# Patient Record
Sex: MALE | Race: WHITE | NOT HISPANIC OR LATINO | Employment: UNEMPLOYED | URBAN - METROPOLITAN AREA
[De-identification: names, ages, dates, MRNs, and addresses within clinical notes are randomized per-mention and may not be internally consistent; named-entity substitution may affect disease eponyms.]

---

## 2017-08-08 ENCOUNTER — ALLSCRIPTS OFFICE VISIT (OUTPATIENT)
Dept: OTHER | Facility: OTHER | Age: 32
End: 2017-08-08

## 2017-12-26 ENCOUNTER — ALLSCRIPTS OFFICE VISIT (OUTPATIENT)
Dept: OTHER | Facility: OTHER | Age: 32
End: 2017-12-26

## 2017-12-27 ENCOUNTER — APPOINTMENT (EMERGENCY)
Dept: RADIOLOGY | Facility: HOSPITAL | Age: 32
End: 2017-12-27
Payer: COMMERCIAL

## 2017-12-27 ENCOUNTER — HOSPITAL ENCOUNTER (EMERGENCY)
Facility: HOSPITAL | Age: 32
Discharge: HOME | End: 2017-12-27
Attending: EMERGENCY MEDICINE | Admitting: EMERGENCY MEDICINE
Payer: COMMERCIAL

## 2017-12-27 ENCOUNTER — ALLSCRIPTS OFFICE VISIT (OUTPATIENT)
Dept: OTHER | Facility: OTHER | Age: 32
End: 2017-12-27

## 2017-12-27 VITALS
RESPIRATION RATE: 16 BRPM | SYSTOLIC BLOOD PRESSURE: 135 MMHG | DIASTOLIC BLOOD PRESSURE: 70 MMHG | WEIGHT: 220 LBS | BODY MASS INDEX: 29.8 KG/M2 | TEMPERATURE: 99.1 F | OXYGEN SATURATION: 98 % | HEIGHT: 72 IN | HEART RATE: 79 BPM

## 2017-12-27 DIAGNOSIS — J36 PERITONSILLAR ABSCESS: Primary | ICD-10-CM

## 2017-12-27 DIAGNOSIS — J02.9 PHARYNGITIS: ICD-10-CM

## 2017-12-27 LAB
ANION GAP SERPL CALCULATED.3IONS-SCNC: 7 MMOL/L (ref 4–13)
BASOPHILS # BLD AUTO: 0 THOUSANDS/ΜL (ref 0–0.1)
BASOPHILS NFR BLD AUTO: 0 % (ref 0–1)
BUN SERPL-MCNC: 15 MG/DL (ref 5–25)
CALCIUM SERPL-MCNC: 8.9 MG/DL (ref 8.3–10.1)
CHLORIDE SERPL-SCNC: 103 MMOL/L (ref 100–108)
CO2 SERPL-SCNC: 30 MMOL/L (ref 21–32)
CREAT SERPL-MCNC: 1.05 MG/DL (ref 0.6–1.3)
EOSINOPHIL # BLD AUTO: 0.2 THOUSAND/ΜL (ref 0–0.61)
EOSINOPHIL NFR BLD AUTO: 2 % (ref 0–6)
ERYTHROCYTE [DISTWIDTH] IN BLOOD BY AUTOMATED COUNT: 13.6 % (ref 11.6–15.1)
GFR SERPL CREATININE-BSD FRML MDRD: 93 ML/MIN/1.73SQ M
GLUCOSE SERPL-MCNC: 99 MG/DL (ref 65–140)
HCT VFR BLD AUTO: 48.3 % (ref 42–52)
HGB BLD-MCNC: 16.1 G/DL (ref 14–18)
LYMPHOCYTES # BLD AUTO: 3.2 THOUSANDS/ΜL (ref 0.6–4.47)
LYMPHOCYTES NFR BLD AUTO: 24 % (ref 14–44)
MCH RBC QN AUTO: 28.4 PG (ref 27–31)
MCHC RBC AUTO-ENTMCNC: 33.3 G/DL (ref 31.4–37.4)
MCV RBC AUTO: 85 FL (ref 82–98)
MONOCYTES # BLD AUTO: 0.7 THOUSAND/ΜL (ref 0.17–1.22)
MONOCYTES NFR BLD AUTO: 6 % (ref 4–12)
NEUTROPHILS # BLD AUTO: 9 THOUSANDS/ΜL (ref 1.85–7.62)
NEUTS SEG NFR BLD AUTO: 68 % (ref 43–75)
NRBC BLD AUTO-RTO: 0 /100 WBCS
PLATELET # BLD AUTO: 261 THOUSANDS/UL (ref 130–400)
PMV BLD AUTO: 7.7 FL (ref 8.9–12.7)
POTASSIUM SERPL-SCNC: 3.4 MMOL/L (ref 3.5–5.3)
RBC # BLD AUTO: 5.67 MILLION/UL (ref 4.7–6.1)
S PYO AG THROAT QL: NEGATIVE
SODIUM SERPL-SCNC: 140 MMOL/L (ref 136–145)
WBC # BLD AUTO: 13.1 THOUSAND/UL (ref 4.8–10.8)

## 2017-12-27 PROCEDURE — 96361 HYDRATE IV INFUSION ADD-ON: CPT

## 2017-12-27 PROCEDURE — 96365 THER/PROPH/DIAG IV INF INIT: CPT

## 2017-12-27 PROCEDURE — 85025 COMPLETE CBC W/AUTO DIFF WBC: CPT | Performed by: EMERGENCY MEDICINE

## 2017-12-27 PROCEDURE — 70487 CT MAXILLOFACIAL W/DYE: CPT

## 2017-12-27 PROCEDURE — 96375 TX/PRO/DX INJ NEW DRUG ADDON: CPT

## 2017-12-27 PROCEDURE — 99284 EMERGENCY DEPT VISIT MOD MDM: CPT

## 2017-12-27 PROCEDURE — 80048 BASIC METABOLIC PNL TOTAL CA: CPT | Performed by: EMERGENCY MEDICINE

## 2017-12-27 PROCEDURE — 36415 COLL VENOUS BLD VENIPUNCTURE: CPT | Performed by: EMERGENCY MEDICINE

## 2017-12-27 RX ORDER — CITALOPRAM 20 MG/1
20 TABLET ORAL DAILY
COMMUNITY
End: 2018-04-09 | Stop reason: SDUPTHER

## 2017-12-27 RX ORDER — AMOXICILLIN AND CLAVULANATE POTASSIUM 875; 125 MG/1; MG/1
1 TABLET, FILM COATED ORAL EVERY 12 HOURS
Qty: 28 TABLET | Refills: 0 | Status: SHIPPED | OUTPATIENT
Start: 2017-12-27 | End: 2018-01-10

## 2017-12-27 RX ORDER — AMOXICILLIN AND CLAVULANATE POTASSIUM 875; 125 MG/1; MG/1
1 TABLET, FILM COATED ORAL EVERY 12 HOURS
Qty: 20 TABLET | Refills: 0 | Status: SHIPPED | OUTPATIENT
Start: 2017-12-27 | End: 2018-01-06

## 2017-12-27 RX ORDER — DEXAMETHASONE SODIUM PHOSPHATE 10 MG/ML
10 INJECTION, SOLUTION INTRAMUSCULAR; INTRAVENOUS ONCE
Status: COMPLETED | OUTPATIENT
Start: 2017-12-27 | End: 2017-12-27

## 2017-12-27 RX ORDER — OMEPRAZOLE 20 MG/1
20 CAPSULE, DELAYED RELEASE ORAL DAILY
COMMUNITY

## 2017-12-27 RX ADMIN — Medication 3 G: at 18:22

## 2017-12-27 RX ADMIN — SODIUM CHLORIDE 1000 ML: 0.9 INJECTION, SOLUTION INTRAVENOUS at 15:38

## 2017-12-27 RX ADMIN — DEXAMETHASONE SODIUM PHOSPHATE 10 MG: 10 INJECTION, SOLUTION INTRAMUSCULAR; INTRAVENOUS at 15:39

## 2017-12-27 RX ADMIN — LIDOCAINE HYDROCHLORIDE 15 ML: 20 SOLUTION ORAL; TOPICAL at 17:11

## 2017-12-27 RX ADMIN — IOHEXOL 100 ML: 350 INJECTION, SOLUTION INTRAVENOUS at 16:59

## 2017-12-27 NOTE — ED NOTES
Discharge instructions reviewed with patient  Patient states understanding  Patient discharged to home       Joseph Bajwa RN  12/27/17 8127

## 2017-12-27 NOTE — DISCHARGE INSTRUCTIONS
Peritonsillar Abscess   WHAT YOU NEED TO KNOW:   A peritonsillar abscess, or PTA, is a collection of pus in the peritonsillar space  The peritonsillar space is the area between your tonsil and the back wall of your throat  It is near the opening of the tubes leading to your stomach and lungs  DISCHARGE INSTRUCTIONS:   Call 911 if:   · You have trouble breathing  Return to the emergency department if:   · You have more pain, swelling, or redness in your throat  · Your symptoms get worse or do not get better, even with treatment  · You have difficulty or pain when you swallow, or you cannot eat or drink  Contact your healthcare provider if:   · Your abscess returns  · You have questions or concerns about your condition or care  Medicines:   · Antibiotics  help treat or prevent a bacterial infection  · Acetaminophen  decreases pain and fever  It is available without a doctor's order  Ask how much to take and how often to take it  Follow directions  Acetaminophen can cause liver damage if not taken correctly  · Steroids  decrease swelling  · NSAIDs , such as ibuprofen, help decrease swelling, pain, and fever  This medicine is available with or without a doctor's order  NSAIDs can cause stomach bleeding or kidney problems in certain people  If you take blood thinner medicine, always ask if NSAIDs are safe for you  Always read the medicine label and follow directions  Do not give these medicines to children under 10months of age without direction from your child's healthcare provider  · Take your medicine as directed  Contact your healthcare provider if you think your medicine is not helping or if you have side effects  Tell him or her if you are allergic to any medicine  Keep a list of the medicines, vitamins, and herbs you take  Include the amounts, and when and why you take them  Bring the list or the pill bottles to follow-up visits   Carry your medicine list with you in case of an emergency  Decrease your risk for a peritonsillar abscess:   · Care for your mouth and teeth  Brush and floss your teeth after you eat, and before you go to sleep  Gently brush your teeth and gums using a brush with soft bristles  Use a mouth rinse after you brush  See your dentist for regular check-ups  · Do not delay treatment for a sore throat  Make an appointment to see your doctor if you have a sore throat that continues for more than a few days  If you have a fever with a sore throat, call your doctor that day  Early treatment may prevent a peritonsillar abscess  Take your antibiotic for throat infections until it is done  · Do not smoke  Nicotine and other chemicals in cigarettes and cigars may increase your risk for a peritonsillar abscess  Ask your healthcare provider for information if you currently smoke and need help to quit  E-cigarettes or smokeless tobacco still contain nicotine  Talk to your healthcare provider before you use these products  Manage your symptoms:   · A liquid diet  may decrease your discomfort until the PTA is healed  A liquid diet may include jello, juices, or ice pops  Follow up with your healthcare provider as directed:  Write down your questions so you can remember to ask them during your visits  © 2017 [facility] Information is for End User's use only and may not be sold, redistributed or otherwise used for commercial purposes  All illustrations and images included in CareNotes® are the copyrighted property of [employer]  or Abhijeet Pelaez  The above information is an  only  It is not intended as medical advice for individual conditions or treatments  Talk to your doctor, nurse or pharmacist before following any medical regimen to see if it is safe and effective for you  Peritonsillar Abscess   WHAT YOU NEED TO KNOW:   A peritonsillar abscess, or PTA, is a collection of pus in the peritonsillar space   The peritonsillar space is the area between your tonsil and the back wall of your throat  It is near the opening of the tubes leading to your stomach and lungs  DISCHARGE INSTRUCTIONS:   Call 911 if:   · You have trouble breathing  Seek care immediately if:   · You have more pain, swelling, or redness in your throat  · Your symptoms get worse or do not get better, even with treatment  · You have difficulty or pain when you swallow, or you cannot eat or drink  Contact your healthcare provider if:   · Your abscess returns  · You have questions or concerns about your condition or care  Medicines:   · Antibiotics  help treat or prevent a bacterial infection  · Acetaminophen  decreases pain and fever  It is available without a doctor's order  Ask how much to take and how often to take it  Follow directions  Acetaminophen can cause liver damage if not taken correctly  · Steroids  decrease swelling  · NSAIDs , such as ibuprofen, help decrease swelling, pain, and fever  This medicine is available with or without a doctor's order  NSAIDs can cause stomach bleeding or kidney problems in certain people  If you take blood thinner medicine, always ask if NSAIDs are safe for you  Always read the medicine label and follow directions  Do not give these medicines to children under 10months of age without direction from your child's healthcare provider  · Take your medicine as directed  Contact your healthcare provider if you think your medicine is not helping or if you have side effects  Tell him or her if you are allergic to any medicine  Keep a list of the medicines, vitamins, and herbs you take  Include the amounts, and when and why you take them  Bring the list or the pill bottles to follow-up visits  Carry your medicine list with you in case of an emergency  Manage your symptoms:   · A liquid diet  may decrease your discomfort until the PTA is healed   A liquid diet may include jello, juices, or ice pops      Follow up with your healthcare provider as directed:  Write down your questions so you can remember to ask them during your visits  © 2017 [address] [employer] Information is for End User's use only and may not be sold, redistributed or otherwise used for commercial purposes  All illustrations and images included in CareNotes® are the copyrighted property of [employer] M [employer]  or Abhijeet Pelaez  The above information is an  only  It is not intended as medical advice for individual conditions or treatments  Talk to your doctor, nurse or pharmacist before following any medical regimen to see if it is safe and effective for you

## 2017-12-27 NOTE — ED PROVIDER NOTES
History  Chief Complaint   Patient presents with    Abscess     throat-began Kwasi SPIVEY said he has abscess and to come here     27-year-old male presents emergency department with right-sided throat pain  Was seen by the primary care physician earlier today and sent in for evaluation for possible rule out abscess        History provided by:  Patient  Sore Throat   Location:  Right  Quality:  Aching  Severity:  Moderate  Onset quality:  Gradual  Timing:  Constant  Progression:  Worsening  Chronicity:  New  Relieved by:  Nothing  Worsened by:  Nothing  Ineffective treatments:  None tried  Associated symptoms: adenopathy and rhinorrhea    Associated symptoms: no abdominal pain, no chest pain, no chills, no cough, no drooling, no fever, no headaches, no neck stiffness, no night sweats, no rash, no shortness of breath, no sinus congestion, no stridor and no trouble swallowing        Prior to Admission Medications   Prescriptions Last Dose Informant Patient Reported? Taking?   citalopram (CeleXA) 20 mg tablet   Yes Yes   Sig: Take 20 mg by mouth daily   omeprazole (PriLOSEC) 20 mg delayed release capsule   Yes Yes   Sig: Take 20 mg by mouth daily      Facility-Administered Medications: None       History reviewed  No pertinent past medical history  History reviewed  No pertinent surgical history  History reviewed  No pertinent family history  I have reviewed and agree with the history as documented  Social History   Substance Use Topics    Smoking status: Former Smoker    Smokeless tobacco: Current User     Types: Chew    Alcohol use Not on file        Review of Systems   Constitutional: Negative for chills, fever and night sweats  HENT: Positive for rhinorrhea and sore throat  Negative for drooling and trouble swallowing  Eyes: Negative for pain  Respiratory: Negative for cough, shortness of breath, wheezing and stridor  Cardiovascular: Negative for chest pain and leg swelling  Gastrointestinal: Negative for abdominal pain, diarrhea and nausea  Endocrine: Negative for polyuria  Genitourinary: Negative for dysuria, flank pain and urgency  Musculoskeletal: Negative for joint swelling, myalgias and neck stiffness  Skin: Negative for rash  Allergic/Immunologic: Negative for immunocompromised state  Neurological: Negative for dizziness, syncope, weakness, numbness and headaches  Hematological: Positive for adenopathy  Psychiatric/Behavioral: Negative for confusion and suicidal ideas  All other systems reviewed and are negative  Physical Exam  ED Triage Vitals   Temperature Pulse Respirations Blood Pressure SpO2   12/27/17 1513 12/27/17 1513 12/27/17 1513 12/27/17 1513 12/27/17 1513   99 1 °F (37 3 °C) 89 18 (!) 149/102 99 %      Temp Source Heart Rate Source Patient Position - Orthostatic VS BP Location FiO2 (%)   12/27/17 1513 12/27/17 1513 12/27/17 1513 12/27/17 1825 --   Oral Monitor Sitting Right arm       Pain Score       12/27/17 1513       7           Orthostatic Vital Signs  Vitals:    12/27/17 1530 12/27/17 1615 12/27/17 1745 12/27/17 1825   BP:  139/83  135/70   Pulse: 88 84 84 79   Patient Position - Orthostatic VS:    Lying       Physical Exam   Constitutional: He is oriented to person, place, and time  He appears well-developed and well-nourished  HENT:   Head: Normocephalic and atraumatic  Mouth/Throat: Uvula is midline  Tonsillar abscesses (right sided tonsilar swelling ) present  Tonsils are 3+ on the right  Tonsils are 2+ on the left  Right-sided tonsillar swelling greater than the left  Noted bilateral tonsillar swelling  Eyes: EOM are normal  Pupils are equal, round, and reactive to light  Neck: Normal range of motion  Neck supple  Cardiovascular: Normal rate and regular rhythm  Exam reveals no friction rub  No murmur heard  Pulmonary/Chest: Breath sounds normal  No respiratory distress  He has no wheezes  He has no rales  Abdominal: Soft  Bowel sounds are normal  He exhibits no distension  There is no tenderness  Musculoskeletal: Normal range of motion  He exhibits no edema or tenderness  Neurological: He is alert and oriented to person, place, and time  Skin: Skin is warm  No rash noted  Psychiatric: He has a normal mood and affect  Nursing note and vitals reviewed  ED Medications  Medications   ampicillin-sulbactam (UNASYN) 3 g in sodium chloride 0 9 % 100 mL IVPB (3 g Intravenous New Bag 12/27/17 1822)   sodium chloride 0 9 % bolus 1,000 mL (0 mL Intravenous Stopped 12/27/17 1706)   dexamethasone (PF) (DECADRON) injection 10 mg (10 mg Intravenous Given 12/27/17 1539)   iohexol (OMNIPAQUE) 350 MG/ML injection (MULTI-DOSE) 100 mL (100 mL Intravenous Given 12/27/17 1659)   lidocaine viscous (XYLOCAINE) 2 % mucosal solution 15 mL (15 mL Swish & Swallow Given 12/27/17 1711)       Diagnostic Studies  Results Reviewed     Procedure Component Value Units Date/Time    Basic metabolic panel [[id number]]  (Abnormal) Collected:  12/27/17 1539    Lab Status:  Final result Specimen:  Blood from Arm, Left Updated:  12/27/17 1555     Sodium 140 mmol/L      Potassium 3 4 (L) mmol/L      Chloride 103 mmol/L      CO2 30 mmol/L      Anion Gap 7 mmol/L      BUN 15 mg/dL      Creatinine 1 05 mg/dL      Glucose 99 mg/dL      Calcium 8 9 mg/dL      eGFR 93 ml/min/1 73sq m     Narrative:         National Kidney Disease Education Program recommendations are as follows:  GFR calculation is accurate only with a steady state creatinine  Chronic Kidney disease less than 60 ml/min/1 73 sq  meters  Kidney failure less than 15 ml/min/1 73 sq  meters      CBC and differential [[id number]]  (Abnormal) Collected:  12/27/17 1539    Lab Status:  Final result Specimen:  Blood from Arm, Left Updated:  12/27/17 1544     WBC 13 10 (H) Thousand/uL      RBC 5 67 Million/uL      Hemoglobin 16 1 g/dL      Hematocrit 48 3 %      MCV 85 fL      MCH 28 4 pg      MCHC 33 3 g/dL      RDW 13 6 %      MPV 7 7 (L) fL      Platelets 868 Thousands/uL      nRBC 0 /100 WBCs      Neutrophils Relative 68 %      Lymphocytes Relative 24 %      Monocytes Relative 6 %      Eosinophils Relative 2 %      Basophils Relative 0 %      Neutrophils Absolute 9 00 (H) Thousands/µL      Lymphocytes Absolute 3 20 Thousands/µL      Monocytes Absolute 0 70 Thousand/µL      Eosinophils Absolute 0 20 Thousand/µL      Basophils Absolute 0 00 Thousands/µL                  CT facial bones with contrast   Final Result by Radha Heller MD (12/27 1723)      Right tonsillar 7 mm fluid collection suggesting a right tonsillar/peritonsillar abscess  Right tonsillar soft tissue swelling suggesting tonsillitis  Bilateral mandibular molar periapical tooth abscesses/periodontal disease  Workstation performed: [id number]                    Procedures  Procedures       Phone Contacts  ED Phone Contact    ED Course  ED Course                                MDM  Number of Diagnoses or Management Options  Peritonsillar abscess: new and requires workup  Pharyngitis: new and requires workup  Diagnosis management comments: 69-year-old male presents to the emergency department right-sided throat pain noted with tonsillitis on examination CT scan performed with IV contrast for evaluation for abscess shows a small 7 mm abscess on the right  Offer the patient drainage in the emergency department given the small size likely not beneficial treat with antibiotics dose of steroids and plan outpatient management with ear nose and throat given strict instructions when to return back to the emergency department  no evidence of respiratory distress stridor or other impending airway issues  Pt re-examined and evaluated after testing and treatment  Spoke with the patient and feeling improved and sxs have resolved  Will discharge home with close f/u with pcp and instructed to return to the ED if sxs worsen or continue   Pt agrees with the plan for discharge and feels comfortable to go home with proper f/u  Advised to return for worsening or additional problems  Diagnostic tests were reviewed and questions answered  Diagnosis, care plan and treatment options were discussed  The patient understand instructions and will follow up as directed  Amount and/or Complexity of Data Reviewed  Clinical lab tests: reviewed and ordered  Tests in the radiology section of CPT®: ordered and reviewed  Review and summarize past medical records: yes      CritCare Time    Disposition  Final diagnoses:   Peritonsillar abscess   Pharyngitis     Time reflects when diagnosis was documented in both MDM as applicable and the Disposition within this note     Time User Action Codes Description Comment    12/27/2017  5:48 PM Sandra Hollingsworth Add [J36] Peritonsillar abscess     12/27/2017  5:48 PM Sandra Hollingsworth Add [J02 9] Pharyngitis       ED Disposition     ED Disposition Condition Comment    Discharge  [address] discharge to home/self care  Condition at discharge: Stable        Follow-up Information     Follow up With Specialties Details Why Farheen Dejesus MD Otolaryngology Call today  [address] [facility] [address] [place] [address]   [phone]          Patient's Medications   Discharge Prescriptions    AMOXICILLIN-CLAVULANATE (AUGMENTIN) 875-125 MG PER TABLET    Take 1 tablet by mouth every 12 (twelve) hours for 14 days       Start Date: 12/27/2017End Date: 1/10/2018       Order Dose: 1 tablet       Quantity: 28 tablet    Refills: 0    AMOXICILLIN-CLAVULANATE (AUGMENTIN) 875-125 MG PER TABLET    Take 1 tablet by mouth every 12 (twelve) hours for 10 days       Start Date: 12/27/2017End Date: 1/6/2018       Order Dose: 1 tablet       Quantity: 20 tablet    Refills: 0     No discharge procedures on file      ED Provider  Electronically Signed by           Deena Waddell DO  12/27/17 9437

## 2018-01-13 VITALS
DIASTOLIC BLOOD PRESSURE: 80 MMHG | HEART RATE: 90 BPM | OXYGEN SATURATION: 100 % | WEIGHT: 207.31 LBS | HEIGHT: 72 IN | RESPIRATION RATE: 18 BRPM | SYSTOLIC BLOOD PRESSURE: 120 MMHG | BODY MASS INDEX: 28.08 KG/M2

## 2018-01-20 NOTE — PROGRESS NOTES
Assessment   1  Sore throat (462) (J02 9)    Plan   Heartburn    · Omeprazole 20 MG Oral Capsule Delayed Release; TAKE 1 CAPSULE DAILY    EVERY MORNING BEFORE BREAKFAST    Discussion/Summary      21-year-old male here for sore throat x3 days with known productive cough  No fever tongue or throat swelling, shortness of breath  Mild pharyngeal erythema with left anterior cervical lymph node appreciated, no tonsillar exudates noted  Given patient's low center criteria, low suspicion for strep pharyngitis, likely viral  Advised patient to take Tylenol or ibuprofen for pain and fever  Also advised to RTO if symptoms worsen such as difficulty breathing, swallowing or hoarseness  The patient was counseled regarding instructions for management,-- risk factor reductions,-- patient and family education  The treatment plan was reviewed with the patient/guardian  The patient/guardian understands and agrees with the treatment plan       Self Referrals: No      Chief Complaint   PATIENT C/O SORETHROAT      History of Present Illness   HPI: Patient is a 21-year-old male here for sore throat x3 days  Reports he also has dry cough, but denies any fever, headache, rhinorrhea, congestion, sob, nausea, vomiting, abdominal pain or diarrhea  Review of Systems        Constitutional: no fever-- and-- no chills  ENT: sore throat, but-- no earache-- and-- no hoarseness  Cardiovascular: no chest pain  Respiratory: cough, but-- no shortness of breath  Gastrointestinal: no abdominal pain,-- no nausea,-- no vomiting-- and-- no diarrhea  Musculoskeletal: no myalgias  Integumentary: no rashes  Neurological: no headache  ROS reviewed  Active Problems   1  Anxiety (300 00) (F41 9)   2  Heartburn (787 1) (R12)   3  Screening for depression (V79 0) (Z13 89)    Past Medical History   1  History of Acute URI (465 9) (J06 9)   2   History of Anxiety and depression (300 4) (F41 8)    Family History   Father    1  Family history of anxiety disorder (V17 0) (Z81 8)   2  Family history of hypertension (V17 49) (Z82 49)    Social History    · Current smoker (305 1) (F17 200)   · Current some day smoker (305 1) (F17 200)    Surgical History   1  Denied: History Of Prior Surgery    Current Meds    1  Citalopram Hydrobromide 20 MG Oral Tablet; TAKE 1 TABLET DAILY; Therapy: [id number] to (Evaluate:[id number])  Requested for: 69Bzm3456; Last     Rx:[id number] Ordered   2  Omeprazole 20 MG Oral Capsule Delayed Release; TAKE 1 CAPSULE DAILY EVERY     MORNING BEFORE BREAKFAST; Therapy: 02SXN2601 to (Evaluate:[id number])  Requested for: 42Ruw8278; Last     Rx:[id number] Ordered     The medication list was reviewed and updated today  Allergies   1  No Known Drug Allergies    Vitals    Recorded: [id number] 05:00PM   Temperature 97 5 F   Heart Rate 95   Respiration 18   Systolic 508   Diastolic 80   Height 6 ft    Weight 219 lb    BMI Calculated 29 7   BSA Calculated 2 21   O2 Saturation 95     Physical Exam        Constitutional      General appearance: No acute distress, well appearing and well nourished  Eyes      Conjunctiva and lids: No swelling, erythema, or discharge  Ears, Nose, Mouth, and Throat      External inspection of ears and nose: Normal  -- mild pharyngeal erythema, no tonsillar exudate  Pulmonary      Respiratory effort: No increased work of breathing or signs of respiratory distress  Auscultation of lungs: Clear to auscultation, equal breath sounds bilaterally, no wheezes, no rales, no rhonci  Cardiovascular      Auscultation of heart: Normal rate and rhythm, normal S1 and S2, without murmurs  Abdomen      Abdomen: Non-tender, no masses  Lymphatic mildly tender left anterior cervical lymphadenopathy  Musculoskeletal      Digits and nails: Normal without clubbing or cyanosis         Skin      Skin and subcutaneous tissue: Normal without rashes or lesions         Psychiatric      Mood and affect: Normal           Signatures    Electronically signed by : Chaya Olivares MD; Dec 30 2017  6:59AM EST                       (Author)     Electronically signed by : JOSE Abreu ; Jan 19 2018  3:56PM EST

## 2018-01-22 VITALS
HEART RATE: 95 BPM | BODY MASS INDEX: 29.66 KG/M2 | HEIGHT: 72 IN | WEIGHT: 219 LBS | DIASTOLIC BLOOD PRESSURE: 80 MMHG | OXYGEN SATURATION: 95 % | SYSTOLIC BLOOD PRESSURE: 120 MMHG | TEMPERATURE: 97.5 F | RESPIRATION RATE: 18 BRPM

## 2018-01-23 VITALS
BODY MASS INDEX: 30.07 KG/M2 | HEART RATE: 90 BPM | WEIGHT: 222 LBS | TEMPERATURE: 97.5 F | RESPIRATION RATE: 18 BRPM | OXYGEN SATURATION: 97 % | DIASTOLIC BLOOD PRESSURE: 74 MMHG | HEIGHT: 72 IN | SYSTOLIC BLOOD PRESSURE: 116 MMHG

## 2018-02-11 ENCOUNTER — APPOINTMENT (EMERGENCY)
Dept: RADIOLOGY | Facility: HOSPITAL | Age: 33
End: 2018-02-11

## 2018-02-11 ENCOUNTER — HOSPITAL ENCOUNTER (EMERGENCY)
Facility: HOSPITAL | Age: 33
Discharge: HOME | End: 2018-02-11
Attending: EMERGENCY MEDICINE | Admitting: EMERGENCY MEDICINE

## 2018-02-11 VITALS
TEMPERATURE: 98.2 F | HEART RATE: 90 BPM | BODY MASS INDEX: 28.48 KG/M2 | OXYGEN SATURATION: 96 % | RESPIRATION RATE: 18 BRPM | DIASTOLIC BLOOD PRESSURE: 93 MMHG | WEIGHT: 210 LBS | SYSTOLIC BLOOD PRESSURE: 144 MMHG

## 2018-02-11 DIAGNOSIS — F41.9 ANXIETY: Primary | ICD-10-CM

## 2018-02-11 PROCEDURE — 99284 EMERGENCY DEPT VISIT MOD MDM: CPT

## 2018-02-11 PROCEDURE — 71046 X-RAY EXAM CHEST 2 VIEWS: CPT

## 2018-02-11 NOTE — DISCHARGE INSTRUCTIONS
Anxiety   WHAT YOU SHOULD KNOW:   Anxiety is a condition that causes you to feel excessive worry, uneasiness, or fear  Family or work stress, smoking, caffeine, and alcohol can increase your risk for anxiety  Certain medicines or health conditions can also increase your risk  Anxiety may begin gradually, and can become a long-term condition if it is not managed or treated  AFTER YOU LEAVE:   Medicines:   · Medicines  can help you feel more calm and relaxed, and decrease your symptoms  · Take your medicine as directed  Contact your healthcare provider if you think your medicine is not helping or if you have side effects  Tell him if you are allergic to any medicine  Keep a list of the medicines, vitamins, and herbs you take  Include the amounts, and when and why you take them  Bring the list or the pill bottles to follow-up visits  Carry your medicine list with you in case of an emergency  Follow up with your healthcare provider within 2 weeks or as directed:  Write down your questions so you remember to ask them during your visits  Manage anxiety:   · Go to counseling as directed  Cognitive behavioral therapy can help you understand and change how you react to events that trigger your symptoms  · Find ways to manage your symptoms  Activities such as exercise, meditation, or listening to music can help you relax  · Practice deep breathing  Breathing can change how your body reacts to stress  Focus on taking slow, deep breaths several times a day, or during an anxiety attack  Breathe in through your nose, and out through your mouth  · Avoid caffeine  Caffeine can make your symptoms worse  Avoid foods or drinks that are meant to increase your energy level  · Limit or avoid alcohol  Ask your healthcare provider if alcohol is safe for you  You may not be able to drink alcohol if you take certain anxiety or depression medicines  Limit alcohol to 1 drink per day if you are a woman   Limit alcohol to 2 drinks per day if you are a man  A drink of alcohol is 12 ounces of beer, 5 ounces of wine, or 1½ ounces of liquor  Contact your healthcare provider if:   · Your symptoms get worse or do not get better with treatment  · You think your medicine may be causing side effects  · Your anxiety keeps you from doing your regular daily activities  · You have new symptoms since your last visit  · You have questions or concerns about your condition or care  Seek care immediately or call 911 if:   · You have chest pain, tightness, or heaviness that may spread to your shoulders, arms, jaw, neck, or back  · You feel like hurting yourself or someone else  · You feel dizzy, lightheaded, or faint  © 2014 [address] is for End User's use only and may not be sold, redistributed or otherwise used for commercial purposes  All illustrations and images included in CareNotes® are the copyrighted property of [employer] M [employer]  or [address]  The above information is an  only  It is not intended as medical advice for individual conditions or treatments  Talk to your doctor, nurse or pharmacist before following any medical regimen to see if it is safe and effective for you

## 2018-02-12 ENCOUNTER — HOSPITAL ENCOUNTER (EMERGENCY)
Facility: HOSPITAL | Age: 33
Discharge: HOME | End: 2018-02-12
Attending: EMERGENCY MEDICINE | Admitting: EMERGENCY MEDICINE

## 2018-02-12 VITALS
WEIGHT: 210 LBS | DIASTOLIC BLOOD PRESSURE: 88 MMHG | TEMPERATURE: 101.8 F | OXYGEN SATURATION: 96 % | RESPIRATION RATE: 16 BRPM | SYSTOLIC BLOOD PRESSURE: 156 MMHG | BODY MASS INDEX: 28.44 KG/M2 | HEART RATE: 118 BPM | HEIGHT: 72 IN

## 2018-02-12 DIAGNOSIS — R50.9 FEVER: Primary | ICD-10-CM

## 2018-02-12 LAB
FLUAV AG SPEC QL IA: NEGATIVE
FLUAV AG SPEC QL: DETECTED
FLUBV AG SPEC QL IA: NEGATIVE
FLUBV AG SPEC QL: ABNORMAL
RSV B RNA SPEC QL NAA+PROBE: ABNORMAL

## 2018-02-12 PROCEDURE — 99283 EMERGENCY DEPT VISIT LOW MDM: CPT

## 2018-02-12 PROCEDURE — 87798 DETECT AGENT NOS DNA AMP: CPT | Performed by: EMERGENCY MEDICINE

## 2018-02-12 PROCEDURE — 87400 INFLUENZA A/B EACH AG IA: CPT | Performed by: EMERGENCY MEDICINE

## 2018-02-12 RX ORDER — IBUPROFEN 600 MG/1
600 TABLET ORAL ONCE
Status: COMPLETED | OUTPATIENT
Start: 2018-02-12 | End: 2018-02-12

## 2018-02-12 RX ORDER — ONDANSETRON 4 MG/1
4 TABLET, ORALLY DISINTEGRATING ORAL EVERY 6 HOURS PRN
Qty: 15 TABLET | Refills: 0 | Status: SHIPPED | OUTPATIENT
Start: 2018-02-12 | End: 2018-11-24

## 2018-02-12 RX ORDER — ONDANSETRON 4 MG/1
4 TABLET, ORALLY DISINTEGRATING ORAL ONCE
Status: COMPLETED | OUTPATIENT
Start: 2018-02-12 | End: 2018-02-12

## 2018-02-12 RX ADMIN — IBUPROFEN 600 MG: 600 TABLET, FILM COATED ORAL at 01:11

## 2018-02-12 RX ADMIN — ONDANSETRON 4 MG: 4 TABLET, ORALLY DISINTEGRATING ORAL at 01:14

## 2018-02-12 NOTE — DISCHARGE INSTRUCTIONS
Fever in Adults   WHAT YOU NEED TO KNOW:   A fever is an increase in your body temperature  Normal body temperature is 98 6°F (37°C)  Fever is generally defined as greater than 100 4°F (38°C)  Common causes include an infection, injury, or disease such as arthritis  DISCHARGE INSTRUCTIONS:   Return to the emergency department if:   · Your fever does not go away or gets worse even after treatment  · You have a stiff neck and a bad headache  · You are confused  You may not be able to think clearly or remember things like you normally do  · Your heart beats faster than usual even after treatment  · You have shortness of breath or chest pain when you breathe  · You urinate small amounts or not at all  · Your skin, lips, or nails turn blue  Contact your healthcare provider if:   · You have abdominal pain or you feel bloated  · You have nausea or are vomiting  · You have pain or burning when you urinate, or you have pain in your back  · You have questions or concerns about your condition or care  Medicines: You may need any of the following:  · NSAIDs , such as ibuprofen, help decrease swelling, pain, and fever  This medicine is available with or without a doctor's order  NSAIDs can cause stomach bleeding or kidney problems in certain people  If you take blood thinner medicine, always ask if NSAIDs are safe for you  Always read the medicine label and follow directions  Do not give these medicines to children under 10months of age without direction from your child's healthcare provider  · Acetaminophen  decreases pain and fever  It is available without a doctor's order  Ask how much to take and how often to take it  Follow directions  Read the labels of all other medicines you are using to see if they also contain acetaminophen, or ask your doctor or pharmacist  Acetaminophen can cause liver damage if not taken correctly   Do not use more than 4 grams (4,000 milligrams) total of acetaminophen in one day  · Antibiotics  may be given if you have an infection caused by bacteria  · Take your medicine as directed  Contact your healthcare provider if you think your medicine is not helping or if you have side effects  Tell him of her if you are allergic to any medicine  Keep a list of the medicines, vitamins, and herbs you take  Include the amounts, and when and why you take them  Bring the list or the pill bottles to follow-up visits  Carry your medicine list with you in case of an emergency  Follow up with your healthcare provider as directed:  Write down your questions so you remember to ask them during your visits  Self-care:   · Drink more liquids as directed  A fever makes you sweat  This can increase your risk for dehydration  Liquids can help prevent dehydration  ¨ Drink at least 6 to 8 eight-ounce cups of clear liquids each day  Drink water, juice, or broth  Do not drink sports drinks  They may contain caffeine  ¨ Ask your healthcare provider if you should drink an oral rehydration solution (ORS)  An ORS has the right amounts of water, salts, and sugar you need to replace body fluids  · Dress in lightweight clothes  Shivers may be a sign that your fever is rising  Do not put extra blankets or clothes on  This may cause your fever to rise even higher  Dress in light, comfortable clothing  Use a lightweight blanket or sheet when you sleep  Change your clothes, blanket, or sheets if they get wet  · Cool yourself safely  Take a bath in cool or lukewarm water  Use an ice pack wrapped in a small towel or wet a washcloth with cool water  Place the ice pack or wet washcloth on your forehead or the back of your neck  © 2017 [address] Information is for End User's use only and may not be sold, redistributed or otherwise used for commercial purposes   All illustrations and images included in CareNotes® are the copyrighted property of [employer]  or Abhijeet Pelaez  The above information is an  only  It is not intended as medical advice for individual conditions or treatments  Talk to your doctor, nurse or pharmacist before following any medical regimen to see if it is safe and effective for you  Upper Respiratory Infection   WHAT YOU NEED TO KNOW:   An upper respiratory infection is also called the common cold  It is an infection that can affect your nose, throat, ears, and sinuses  For healthy people, the common cold is usually not serious and does not need special treatment  Cold symptoms are usually worst for the first 3 to 5 days  Most people get better in 7 to 14 days  You may continue to cough for 2 to 3 weeks  Colds are caused by viruses and do not get better with antibiotics  DISCHARGE INSTRUCTIONS:   Seek care immediately if:   · You have chest pain or trouble breathing  Contact your healthcare provider if:   · You have a fever over 102ºF (39°C)  · Your sore throat gets worse or you see white or yellow spots in your throat  · Your symptoms get worse after 3 to 5 days or your cold is not better in 14 days  · You have a rash anywhere on your skin  · You have large, tender lumps in your neck  · You have thick, green, or yellow drainage from your nose  · You cough up thick yellow, green, or bloody mucus  · You are vomiting for more than 24 hours and cannot keep fluids down  · You have a bad earache  · You have questions or concerns about your condition or care  Medicines: You may need any of the following:  · Decongestants  help reduce nasal congestion and help you breathe more easily  If you take decongestant pills, they may make you feel restless or cause problems with your sleep  Do not use decongestant sprays for more than a few days  · Cough suppressants  help reduce coughing  Ask your healthcare provider which type of cough medicine is best for you       · NSAIDs , such as ibuprofen, help decrease swelling, pain, and fever  NSAIDs can cause stomach bleeding or kidney problems in certain people  If you take blood thinner medicine, always ask your healthcare provider if NSAIDs are safe for you  Always read the medicine label and follow directions  · Acetaminophen  decreases pain and fever  It is available without a doctor's order  Ask how much to take and how often to take it  Follow directions  Read the labels of all other medicines you are using to see if they also contain acetaminophen, or ask your doctor or pharmacist  Acetaminophen can cause liver damage if not taken correctly  Do not use more than 4 grams (4,000 milligrams) total of acetaminophen in one day  · Take your medicine as directed  Contact your healthcare provider if you think your medicine is not helping or if you have side effects  Tell him or her if you are allergic to any medicine  Keep a list of the medicines, vitamins, and herbs you take  Include the amounts, and when and why you take them  Bring the list or the pill bottles to follow-up visits  Carry your medicine list with you in case of an emergency  Follow up with your healthcare provider as directed:  Write down your questions so you remember to ask them during your visits  Self-care:   · Rest as much as possible  Slowly start to do more each day  · Drink more liquids as directed  Liquids will help thin and loosen mucus so you can cough it up  Liquids will also help prevent dehydration  Liquids that help prevent dehydration include water, fruit juice, and broth  Do not drink liquids that contain caffeine  Caffeine can increase your risk for dehydration  Ask your healthcare provider how much liquid to drink each day  · Soothe a sore throat  Gargle with warm salt water  This helps your sore throat feel better  Make salt water by dissolving ¼ teaspoon salt in 1 cup warm water  You may also suck on hard candy or throat lozenges   You may use a sore throat spray  · Use a humidifier or vaporizer  Use a cool mist humidifier or a vaporizer to increase air moisture in your home  This may make it easier for you to breathe and help decrease your cough  · Use saline nasal drops as directed  These help relieve congestion  · Apply petroleum-based jelly around the outside of your nostrils  This can decrease irritation from blowing your nose  · Do not smoke  Nicotine and other chemicals in cigarettes and cigars can make your symptoms worse  They can also cause infections such as bronchitis or pneumonia  Ask your healthcare provider for information if you currently smoke and need help to quit  E-cigarettes or smokeless tobacco still contain nicotine  Talk to your healthcare provider before you use these products  Prevent spreading your cold to others:   · Try to stay away from other people during the first 2 to 3 days of your cold when it is more easily spread  · Do not share food or drinks  · Do not share hand towels with household members  · Wash your hands often, especially after you blow your nose  Turn away from other people and cover your mouth and nose with a tissue when you sneeze or cough  © 2017 [facility] Information is for End User's use only and may not be sold, redistributed or otherwise used for commercial purposes  All illustrations and images included in CareNotes® are the copyrighted property of [employer]  or Abhijeet Pelaez  The above information is an  only  It is not intended as medical advice for individual conditions or treatments  Talk to your doctor, nurse or pharmacist before following any medical regimen to see if it is safe and effective for you

## 2018-02-17 NOTE — ED PROVIDER NOTES
History  Chief Complaint   Patient presents with    Flu Symptoms     Pt c/o feeling flushed and having chills  Pt is concerned that he "might have the flu and die "     Patient presents for evaluation of aches and chills  Thinks he might have the flu and will die  Patient seen here earlier and discharged with anxiety  Now has a fever  History provided by:  Patient   used: No    Flu Symptoms   Presenting symptoms: fever    Presenting symptoms: no shortness of breath    Associated symptoms: chills        Prior to Admission Medications   Prescriptions Last Dose Informant Patient Reported? Taking?   citalopram (CeleXA) 20 mg tablet   Yes No   Sig: Take 20 mg by mouth daily   omeprazole (PriLOSEC) 20 mg delayed release capsule   Yes No   Sig: Take 20 mg by mouth daily      Facility-Administered Medications: None       Past Medical History:   Diagnosis Date    Psychiatric disorder        History reviewed  No pertinent surgical history  History reviewed  No pertinent family history  I have reviewed and agree with the history as documented  Social History   Substance Use Topics    Smoking status: Former Smoker    Smokeless tobacco: Current User     Types: Chew    Alcohol use No        Review of Systems   Constitutional: Positive for chills and fever  Respiratory: Negative for shortness of breath  Cardiovascular: Negative for chest pain  All other systems reviewed and are negative        Physical Exam  ED Triage Vitals   Temperature Pulse Respirations Blood Pressure SpO2   02/12/18 0054 02/12/18 0054 02/12/18 0054 02/12/18 0054 02/12/18 0054   (!) 101 8 °F (38 8 °C) (!) 148 (!) 24 156/88 96 %      Temp Source Heart Rate Source Patient Position - Orthostatic VS BP Location FiO2 (%)   02/12/18 0054 02/12/18 0143 02/12/18 0054 02/12/18 0054 --   Tympanic Monitor Lying Right arm       Pain Score       02/12/18 0054       No Pain           Orthostatic Vital Signs  Vitals: 02/12/18 0054 02/12/18 0143   BP: 156/88    Pulse: (!) 148 (!) 118   Patient Position - Orthostatic VS: Lying        Physical Exam   Constitutional: He is oriented to person, place, and time  No distress  HENT:   Mouth/Throat: Oropharynx is clear and moist    Eyes: Pupils are equal, round, and reactive to light  Neck: Normal range of motion  Cardiovascular: Normal rate, regular rhythm and intact distal pulses  Pulmonary/Chest: Effort normal and breath sounds normal  No respiratory distress  Abdominal: Soft  There is no tenderness  Musculoskeletal: Normal range of motion  Neurological: He is alert and oriented to person, place, and time  Skin: Capillary refill takes less than 2 seconds  He is not diaphoretic  Nursing note and vitals reviewed        ED Medications  Medications   ibuprofen (MOTRIN) tablet 600 mg (600 mg Oral Given 2/12/18 0111)   ondansetron (ZOFRAN-ODT) dispersible tablet 4 mg (4 mg Oral Given 2/12/18 0114)       Diagnostic Studies  Results Reviewed     Procedure Component Value Units Date/Time    Influenza A/B and RSV by PCR (Indicated for patients > 2 mo of age) [[id number]]  (Abnormal) Collected:  02/12/18 0111    Lab Status:  Final result Specimen:  Nasopharyngeal from Nasopharyngeal Swab Updated:  02/13/18 1814     INFLU A PCR Detected (A)     INFLU B PCR None Detected     RSV PCR None Detected    Rapid Influenza Screen with Reflex PCR (indicated for patients <2mo of age) [[id number]]  (Normal) Collected:  02/12/18 0111    Lab Status:  Final result Specimen:  Nasopharyngeal from Nasopharyngeal Swab Updated:  02/12/18 0134     Rapid Influenza A Ag Negative     Rapid Influenza B Ag Negative                 No orders to display              Procedures  Procedures       Phone Contacts  ED Phone Contact    ED Course  ED Course                                MDM  Number of Diagnoses or Management Options  Fever:   Diagnosis management comments: Pulse ox 96% on RA indicating adequate oxygenation    Rapid flu was negative advised confirmatory test will be sent and will be called with results  Reassured and discharged  Amount and/or Complexity of Data Reviewed  Clinical lab tests: ordered and reviewed    Patient Progress  Patient progress: stable    CritCare Time    Disposition  Final diagnoses:   Fever     Time reflects when diagnosis was documented in both MDM as applicable and the Disposition within this note     Time User Action Codes Description Comment    2/12/2018  1:38 AM Mario Alberto Colin Mitch [R50 9] Fever       ED Disposition     ED Disposition Condition Comment    Discharge  Juan Antonio Both discharge to home/self care  Condition at discharge: stable        Follow-up Information     Follow up With Specialties Details Why Contact Info    Christopher Potter MD Family Medicine In 3 days  [address] [address]  [phone]          Discharge Medication List as of 2/12/2018  1:39 AM      CONTINUE these medications which have NOT CHANGED    Details   citalopram (CeleXA) 20 mg tablet Take 20 mg by mouth daily, Historical Med      omeprazole (PriLOSEC) 20 mg delayed release capsule Take 20 mg by mouth daily, Historical Med           No discharge procedures on file      ED Provider  Electronically Signed by           Mary Daugherty DO  02/16/18 2676

## 2018-03-24 ENCOUNTER — HOSPITAL ENCOUNTER (EMERGENCY)
Facility: HOSPITAL | Age: 33
Discharge: HOME | End: 2018-03-24
Attending: EMERGENCY MEDICINE | Admitting: EMERGENCY MEDICINE
Payer: SUBSIDIZED

## 2018-03-24 VITALS
OXYGEN SATURATION: 95 % | HEIGHT: 72 IN | RESPIRATION RATE: 18 BRPM | SYSTOLIC BLOOD PRESSURE: 140 MMHG | BODY MASS INDEX: 28.44 KG/M2 | HEART RATE: 109 BPM | WEIGHT: 210 LBS | DIASTOLIC BLOOD PRESSURE: 104 MMHG | TEMPERATURE: 99.2 F

## 2018-03-24 DIAGNOSIS — J02.0 STREP THROAT: Primary | ICD-10-CM

## 2018-03-24 PROCEDURE — 99282 EMERGENCY DEPT VISIT SF MDM: CPT

## 2018-03-24 RX ORDER — AMOXICILLIN 250 MG/1
500 CAPSULE ORAL ONCE
Status: COMPLETED | OUTPATIENT
Start: 2018-03-24 | End: 2018-03-24

## 2018-03-24 RX ORDER — AMOXICILLIN 500 MG/1
500 CAPSULE ORAL 3 TIMES DAILY
Qty: 30 CAPSULE | Refills: 0 | Status: SHIPPED | OUTPATIENT
Start: 2018-03-24 | End: 2018-04-03

## 2018-03-24 RX ADMIN — AMOXICILLIN 500 MG: 250 CAPSULE ORAL at 12:39

## 2018-03-24 NOTE — ED PROVIDER NOTES
History  Chief Complaint   Patient presents with    Sore Throat     pt reports sore throat x a day and a half  Patient has a history of recurrent streptococcal pharyngitis  He has had strep throat many times and has had peritonsillar abscesses as well  Patient was well until about a day and a half ago when he started having a sore throat associated with fevers and diaphoresis  He has no congestion no cough  He has noted no rashes  Patient looked in his pharynx and noted the white exudates that are typical of his recurrent infections  Patient came in suspected he has strep throat again  Prior to Admission Medications   Prescriptions Last Dose Informant Patient Reported? Taking?   citalopram (CeleXA) 20 mg tablet   Yes No   Sig: Take 20 mg by mouth daily   omeprazole (PriLOSEC) 20 mg delayed release capsule   Yes No   Sig: Take 20 mg by mouth daily   ondansetron (ZOFRAN-ODT) 4 mg disintegrating tablet   No No   Sig: Take 1 tablet (4 mg total) by mouth every 6 (six) hours as needed for nausea or vomiting for up to 15 doses      Facility-Administered Medications: None       Past Medical History:   Diagnosis Date    Psychiatric disorder        History reviewed  No pertinent surgical history  History reviewed  No pertinent family history  I have reviewed and agree with the history as documented  Social History   Substance Use Topics    Smoking status: Former Smoker    Smokeless tobacco: Current User     Types: Chew    Alcohol use No        Review of Systems   Constitutional: Positive for diaphoresis and fever  HENT: Positive for sore throat  Negative for congestion, sinus pain and sinus pressure  Respiratory: Negative for cough and shortness of breath  Cardiovascular: Negative for chest pain  Gastrointestinal: Negative for abdominal pain  Skin: Negative for rash  All other systems reviewed and are negative        Physical Exam  ED Triage Vitals [03/24/18 1221]   Temperature Pulse Respirations Blood Pressure SpO2   99 2 °F (37 3 °C) (!) 109 18 (!) 140/104 95 %      Temp Source Heart Rate Source Patient Position - Orthostatic VS BP Location FiO2 (%)   Tympanic -- -- -- --      Pain Score       8           Orthostatic Vital Signs  Vitals:    03/24/18 1221   BP: (!) 140/104   Pulse: (!) 109       Physical Exam   Constitutional: He is oriented to person, place, and time  He appears well-developed and well-nourished  HENT:   Head: Normocephalic  Right Ear: External ear normal    Left Ear: External ear normal    Mouth/Throat: Oropharyngeal exudate present  Eyes: Conjunctivae and EOM are normal    Neck: Normal range of motion  Neck supple  Cardiovascular: Normal rate, regular rhythm, normal heart sounds and intact distal pulses  Pulmonary/Chest: Effort normal and breath sounds normal    Abdominal: Soft  Bowel sounds are normal  There is tenderness  Musculoskeletal: Normal range of motion  He exhibits no edema  Lymphadenopathy:     He has cervical adenopathy  Neurological: He is alert and oriented to person, place, and time  Skin: Skin is warm and dry  Psychiatric: He has a normal mood and affect  His behavior is normal    Nursing note and vitals reviewed  ED Medications  Medications   amoxicillin (AMOXIL) capsule 500 mg (not administered)       Diagnostic Studies  Results Reviewed     None                 No orders to display              Procedures  Procedures       Phone Contacts  ED Phone Contact    ED Course  ED Course                                MDM  Number of Diagnoses or Management Options  Diagnosis management comments: Exhibited pharyngitis in this patient without her the symptoms and a history of recurrent strep is clearly strep once again    Will start him on amoxicillin now    CritCare Time    Disposition  Final diagnoses:   Strep throat     Time reflects when diagnosis was documented in both MDM as applicable and the Disposition within this note Time User Action Codes Description Comment    3/24/2018 12:36 PM Miller VILLEDA Add [J02 0] Strep throat       ED Disposition     ED Disposition Condition Comment    Discharge  Mariya Hogue discharge to home/self care  Condition at discharge: Stable        Follow-up Information     Follow up With Specialties Details Why Contact Info    Batool Fuentes MD Family Medicine In 1 day  [address]  [address] [place] [address]  [phone]          Patient's Medications   Discharge Prescriptions    AMOXICILLIN (AMOXIL) 500 MG CAPSULE    Take 1 capsule (500 mg total) by mouth 3 (three) times a day for 10 days       Start Date: 3/24/2018 End Date: 4/3/2018       Order Dose: 500 mg       Quantity: 30 capsule    Refills: 0     No discharge procedures on file      ED Provider  Electronically Signed by           Dave Wright MD  03/24/18 7399

## 2018-03-24 NOTE — DISCHARGE INSTRUCTIONS
Strep Throat, Ambulatory Care   GENERAL INFORMATION:   Strep throat  is a throat infection caused by bacteria  It is easily spread from person to person  Common symptoms include the following:   · Sore, red, and swollen throat    · Fever and headache     · Upset stomach, abdominal pain, or vomiting    · White or yellow patches or blisters in the back of your throat    · Tender, swollen lumps on the sides of your neck or jaw    · Throat pain when you swallow  Seek immediate care for the following symptoms:   · Throat pain that makes it too painful to drink fluids    · Drooling because you cannot swallow your spit    · Not able to open your mouth all the way or your voice is muffled    · Trouble breathing because your throat is swollen    · New symptoms like a bad headache, stiff neck, chest pain, or vomiting    · Blood in your urine and swollen face, feet, or hands  Treatment for strep throat:  You will need antibiotic medicine to treat your strep throat  Take your antibiotics until they are gone, even if you feel better  Do this unless your caregiver says it is okay to stop your antibiotics  You may return to work or school 24 hours after you start antibiotics  Manage strep throat:   · Do not smoke  If you smoke, it is never too late to quit  Smoking may make your symptoms worse  Ask for information if you need help quitting  · Drink juice, milk shakes, or soup  if your throat is too sore to eat solid food  Drinking liquids can also help prevent dehydration  · Gargle with salt water  Mix ¼ teaspoon salt in a glass of warm water and gargle  This may help reduce swelling in your throat  · Use lozenges, ice, soft foods, or popsicles  to soothe your throat    Prevent the spread of strep throat:   · Do not share food or drinks    · Wash your hands often    · Replace your toothbrush after you have taken antibiotics for 24 hours  Follow up with your healthcare provider as directed:  Write down your questions so you remember to ask them during your visits  CARE AGREEMENT:   You have the right to help plan your care  Learn about your health condition and how it may be treated  Discuss treatment options with your caregivers to decide what care you want to receive  You always have the right to refuse treatment  The above information is an  only  It is not intended as medical advice for individual conditions or treatments  Talk to your doctor, nurse or pharmacist before following any medical regimen to see if it is safe and effective for you  © 2014 [address] is for End User's use only and may not be sold, redistributed or otherwise used for commercial purposes  All illustrations and images included in CareNotes® are the copyrighted property of [employer]  or Abhijeet Pelaez

## 2018-04-09 DIAGNOSIS — F41.9 ANXIETY: Primary | ICD-10-CM

## 2018-04-09 RX ORDER — CITALOPRAM 20 MG/1
20 TABLET ORAL DAILY
Qty: 30 TABLET | Refills: 0 | Status: SHIPPED | OUTPATIENT
Start: 2018-04-09 | End: 2018-04-25 | Stop reason: SDUPTHER

## 2018-04-09 RX ORDER — CITALOPRAM 20 MG/1
TABLET ORAL
Qty: 30 TABLET | Refills: 0 | OUTPATIENT
Start: 2018-04-09

## 2018-04-24 DIAGNOSIS — F41.9 ANXIETY: ICD-10-CM

## 2018-04-24 RX ORDER — CITALOPRAM 20 MG/1
20 TABLET ORAL DAILY
Qty: 30 TABLET | Refills: 0 | Status: CANCELLED | OUTPATIENT
Start: 2018-04-24

## 2018-04-25 ENCOUNTER — OFFICE VISIT (OUTPATIENT)
Dept: FAMILY MEDICINE CLINIC | Facility: CLINIC | Age: 33
End: 2018-04-25

## 2018-04-25 DIAGNOSIS — R03.0 ELEVATED BLOOD PRESSURE, SITUATIONAL: ICD-10-CM

## 2018-04-25 DIAGNOSIS — Z00.00 HEALTH CARE MAINTENANCE: ICD-10-CM

## 2018-04-25 DIAGNOSIS — F41.9 ANXIETY: Primary | ICD-10-CM

## 2018-04-25 PROCEDURE — 99213 OFFICE O/P EST LOW 20 MIN: CPT | Performed by: FAMILY MEDICINE

## 2018-04-25 RX ORDER — CITALOPRAM 20 MG/1
20 TABLET ORAL DAILY
Qty: 60 TABLET | Refills: 3 | Status: SHIPPED | OUTPATIENT
Start: 2018-04-25 | End: 2019-06-17 | Stop reason: SDUPTHER

## 2018-04-27 VITALS
BODY MASS INDEX: 27.77 KG/M2 | SYSTOLIC BLOOD PRESSURE: 125 MMHG | OXYGEN SATURATION: 96 % | RESPIRATION RATE: 20 BRPM | HEART RATE: 80 BPM | DIASTOLIC BLOOD PRESSURE: 80 MMHG | HEIGHT: 72 IN | WEIGHT: 205 LBS

## 2018-04-27 PROBLEM — F41.9 ANXIETY: Status: ACTIVE | Noted: 2018-04-27

## 2018-04-27 PROBLEM — R03.0 ELEVATED BLOOD PRESSURE, SITUATIONAL: Status: ACTIVE | Noted: 2018-04-27

## 2018-04-27 NOTE — ASSESSMENT & PLAN NOTE
- pt has severe anxiety; he does report improvement on symptoms on Celexa 20mg qd, but then reduced the dose since he didn'thave enough medication pills and di not want to come to a doctor for refill; will refill his Celexa at 20mg qd, he also is strongly advised to contact [facility] and [facility] to see who he can get in sooner with for psychotherapy, including CBT   Also gave him the name of an Anxiety workbook, The Anxiety and Phobia workbook, for him to read to help with symptoms  - his anxiety has huge contribution to his constant worry about his health including his BP; at this time I did order some basic labs, and advised him to return for a CPE so that we can screen him appropriately; at that time we can address other worries of his about his health  - denies depression, no SI/HI

## 2018-04-27 NOTE — PROGRESS NOTES
Assessment/Plan:    Anxiety  - pt has severe anxiety; he does report improvement on symptoms on Celexa 20mg qd, but then reduced the dose since he didn'thave enough medication pills and di not want to come to a doctor for refill; will refill his Celexa at 20mg qd, he also is strongly advised to contact [facility] and [facility] to see who he can get in sooner with for psychotherapy, including CBT  Also gave him the name of an Anxiety workbook, The Anxiety and Phobia workbook, for him to read to help with symptoms  - his anxiety has huge contribution to his constant worry about his health including his BP; at this time I did order some basic labs, and advised him to return for a CPE so that we can screen him appropriately; at that time we can address other worries of his about his health  - denies depression, no SI/HI    Elevated blood pressure, situational  - review of EMR past couple months shows normal appearing BP; pt reports recently every time he takes his BP at home, he is very anxious, and has been having few readings with SBP of 140s, never >150 or >100 for DBP  - at this visit, although initial BP elevated 162/80, repeat was initially 125/80   Discussed with him that his anxiety, and worrying about BP plays a role in elevated it, which pt also admits that he gets very anxious soon as the BP cuff is on; asked him to keep a log of his BP at home, measure it once a week when he is most relaxed, and then we can review the log at his next visit; if we get at least two high readings, we can consider dx of HTN, but even then treating his anxiety would be mainstay for the elevated BP      - pt also worried about an incidental finding of 9mm calcification seen in his pelvis on screening XR that was done at his work, no urinary symptoms, no pain in pelvis area, reassurance given that this is benign and common - called phlebolith     - more than 50% of visit spent on discussion of anxiety mgt and counseling on importance of psychotherapy    Subjective: Anxiety & elevated BP     Patient ID: Parisa Bender is a 34 y o  male  Pt here to discuss anxiety & recent elevated BPs at home:  - anxiety: notes severe anxiety x years, constantly worrying about everything, feeling something bad's going to happen; was started last year on celexa, dose increased to 20 qd couple months ago that he reports did help with his anxiety, make him more calm, but he was running out of the med so started taking 0 5 tab every other day as he was scared of coming to see a doc; not seen a therapist yet, not had any psychotherapy; denies depression  - pt reports he uses his sister's BP cuff at home to take BP once every other week and has noted occasional readings of 140s SBP/80s DBP, reports he feeling very anxious and scared soon as the BP cuff is placed on his arm, thinks his anxiety may be elevating his BP; worried that couple family members have HTN, so he is worried if this will kill him; no recent diagnosis of HTN  - got a screening XR of back for his job at work which found incidental 9mm calcification in his pelvis, denies any hematuria/dysuria/flank pain/fevers/injuries/pelvic pain/back pain        The following portions of the patient's history were reviewed and updated as appropriate: allergies, current medications, past medical history, past social history and past surgical history  Review of Systems   Constitutional: Negative for chills and fever  Respiratory: Negative for shortness of breath and wheezing  Gastrointestinal: Negative for abdominal pain, nausea and vomiting  Objective:      /80   Pulse 80   Resp 20   Ht 6' (1 829 m)   Wt 93 kg (205 lb)   SpO2 96%   BMI 27 80 kg/m²          Physical Exam   Constitutional: No distress  HENT:   Head: Normocephalic and atraumatic     Right Ear: External ear normal    Left Ear: External ear normal    Cardiovascular: Normal rate, regular rhythm, normal heart sounds and intact distal pulses  No murmur heard  Pulmonary/Chest: Effort normal and breath sounds normal  No respiratory distress  He has no wheezes  He has no rales  Abdominal: Soft  Bowel sounds are normal  He exhibits no distension  There is no tenderness  There is no rebound  Musculoskeletal: He exhibits no edema, tenderness or deformity  Neurological: He is alert  Skin: Skin is warm and dry  He is not diaphoretic  Psychiatric: His behavior is normal  His mood appears anxious

## 2018-04-27 NOTE — ASSESSMENT & PLAN NOTE
- review of EMR past couple months shows normal appearing BP; pt reports recently every time he takes his BP at home, he is very anxious, and has been having few readings with SBP of 140s, never >150 or >100 for DBP  - at this visit, although initial BP elevated 162/80, repeat was initially 125/80   Discussed with him that his anxiety, and worrying about BP plays a role in elevated it, which pt also admits that he gets very anxious soon as the BP cuff is on; asked him to keep a log of his BP at home, measure it once a week when he is most relaxed, and then we can review the log at his next visit; if we get at least two high readings, we can consider dx of HTN, but even then treating his anxiety would be mainstay for the elevated BP

## 2018-05-09 ENCOUNTER — OFFICE VISIT (OUTPATIENT)
Dept: FAMILY MEDICINE CLINIC | Facility: CLINIC | Age: 33
End: 2018-05-09

## 2018-05-09 VITALS
HEART RATE: 115 BPM | BODY MASS INDEX: 27.5 KG/M2 | RESPIRATION RATE: 20 BRPM | WEIGHT: 203 LBS | TEMPERATURE: 97.6 F | HEIGHT: 72 IN | SYSTOLIC BLOOD PRESSURE: 122 MMHG | OXYGEN SATURATION: 98 % | DIASTOLIC BLOOD PRESSURE: 74 MMHG

## 2018-05-09 DIAGNOSIS — B34.9 VIRAL SYNDROME: Primary | ICD-10-CM

## 2018-05-09 PROCEDURE — 99213 OFFICE O/P EST LOW 20 MIN: CPT | Performed by: NURSE PRACTITIONER

## 2018-05-09 NOTE — PROGRESS NOTES
Assessment/Plan:  1  You can use nasal saline for the nasal congestion  2  Use Mucinex for cough  3  Follow-up condition changes or worsens     Diagnoses and all orders for this visit:    Viral syndrome          Subjective:      Patient ID: Abby Allison is a 34 y o  male  34-year-old male presents with cough since Thursday  Reports when he cough there is a little bit of blood  Denies any recent travel  Reports he had a negative TB screen recently  Reports postnasal drip and congestion  Denies wheezing/SOB/CP  Denies fever  Denies medications  Was recently exposed to somebody with similar symptoms  The following portions of the patient's history were reviewed and updated as appropriate: allergies and current medications  Review of Systems   Constitutional: Negative  HENT: Positive for congestion  Respiratory: Positive for cough  Cardiovascular: Negative  Objective:      /74   Pulse (!) 115   Temp 97 6 °F (36 4 °C)   Resp 20   Ht 6' (1 829 m)   Wt 92 1 kg (203 lb)   SpO2 98%   BMI 27 53 kg/m²          Physical Exam   Constitutional: He appears well-developed and well-nourished  HENT:   Head: Normocephalic and atraumatic  Right Ear: External ear normal    Left Ear: External ear normal    Nose: Nose normal    Mouth/Throat: Oropharynx is clear and moist    Cardiovascular: Normal rate, regular rhythm and normal heart sounds      Pulmonary/Chest: Effort normal and breath sounds normal

## 2018-10-02 ENCOUNTER — OFFICE VISIT (OUTPATIENT)
Dept: FAMILY MEDICINE CLINIC | Facility: CLINIC | Age: 33
End: 2018-10-02

## 2018-10-02 VITALS
TEMPERATURE: 97.4 F | SYSTOLIC BLOOD PRESSURE: 122 MMHG | DIASTOLIC BLOOD PRESSURE: 78 MMHG | HEART RATE: 76 BPM | OXYGEN SATURATION: 96 % | RESPIRATION RATE: 18 BRPM

## 2018-10-02 DIAGNOSIS — K04.7 TOOTH INFECTION: Primary | ICD-10-CM

## 2018-10-02 PROCEDURE — 99213 OFFICE O/P EST LOW 20 MIN: CPT | Performed by: NURSE PRACTITIONER

## 2018-10-02 RX ORDER — AMOXICILLIN AND CLAVULANATE POTASSIUM 875; 125 MG/1; MG/1
1 TABLET, FILM COATED ORAL EVERY 12 HOURS SCHEDULED
Qty: 14 TABLET | Refills: 0 | Status: SHIPPED | OUTPATIENT
Start: 2018-10-02 | End: 2018-10-09

## 2018-10-02 NOTE — PROGRESS NOTES
Assessment/Plan: 1  Take NSAID for symptom relief  2  Make sure you follow up with her dentist in the next couple of days for re-evaluation  3  Follow up if condition changes or worsens     Diagnoses and all orders for this visit:    Tooth infection  -     amoxicillin-clavulanate (AUGMENTIN) 875-125 mg per tablet; Take 1 tablet by mouth every 12 (twelve) hours for 7 days          Subjective:      Patient ID: Ilya Allen is a 34 y o  male  31yo M presents with tooth pain in the right upper and lower molars for about 3 days  Reports that he has gotten this before and was treated with antibiotic   Denies swelling  Denies fever  Taking ibuprofen and using and Anbesol  Helping  Needs to find a dentist   Pain in same spot he had abscess in over 1 year ago  Abscess resolved with abx  The following portions of the patient's history were reviewed and updated as appropriate: allergies and current medications  Review of Systems   Constitutional: Negative  HENT:        Tooth pain         Objective:      /78   Pulse 76   Temp (!) 97 4 °F (36 3 °C)   Resp 18   SpO2 96%          Physical Exam   Constitutional: He appears well-developed and well-nourished  HENT:   Head: Normocephalic and atraumatic  Right Ear: External ear normal    Left Ear: External ear normal    Nose: Nose normal    Mouth/Throat: Oropharynx is clear and moist     Poor dentition   Cardiovascular: Normal rate and normal heart sounds      Pulmonary/Chest: Effort normal and breath sounds normal

## 2018-11-24 ENCOUNTER — APPOINTMENT (EMERGENCY)
Dept: RADIOLOGY | Facility: HOSPITAL | Age: 33
End: 2018-11-24

## 2018-11-24 ENCOUNTER — HOSPITAL ENCOUNTER (EMERGENCY)
Facility: HOSPITAL | Age: 33
Discharge: HOME | End: 2018-11-24
Attending: EMERGENCY MEDICINE | Admitting: EMERGENCY MEDICINE

## 2018-11-24 VITALS
WEIGHT: 210 LBS | RESPIRATION RATE: 18 BRPM | SYSTOLIC BLOOD PRESSURE: 142 MMHG | BODY MASS INDEX: 28.48 KG/M2 | DIASTOLIC BLOOD PRESSURE: 84 MMHG | HEART RATE: 80 BPM | OXYGEN SATURATION: 97 % | TEMPERATURE: 98 F

## 2018-11-24 DIAGNOSIS — K04.7 PERIAPICAL ABSCESS: Primary | ICD-10-CM

## 2018-11-24 LAB
ANION GAP SERPL CALCULATED.3IONS-SCNC: 3 MMOL/L (ref 4–13)
BASOPHILS # BLD AUTO: 0.03 THOUSANDS/ΜL (ref 0–0.1)
BASOPHILS NFR BLD AUTO: 0 % (ref 0–1)
BUN SERPL-MCNC: 12 MG/DL (ref 5–25)
CALCIUM SERPL-MCNC: 9.7 MG/DL (ref 8.3–10.1)
CHLORIDE SERPL-SCNC: 103 MMOL/L (ref 100–108)
CO2 SERPL-SCNC: 30 MMOL/L (ref 21–32)
CREAT SERPL-MCNC: 0.96 MG/DL (ref 0.6–1.3)
EOSINOPHIL # BLD AUTO: 0.11 THOUSAND/ΜL (ref 0–0.61)
EOSINOPHIL NFR BLD AUTO: 1 % (ref 0–6)
ERYTHROCYTE [DISTWIDTH] IN BLOOD BY AUTOMATED COUNT: 12.9 % (ref 11.6–15.1)
GFR SERPL CREATININE-BSD FRML MDRD: 103 ML/MIN/1.73SQ M
GLUCOSE SERPL-MCNC: 135 MG/DL (ref 65–140)
HCT VFR BLD AUTO: 52.8 % (ref 36.5–49.3)
HGB BLD-MCNC: 17.3 G/DL (ref 12–17)
IMM GRANULOCYTES # BLD AUTO: 0.03 THOUSAND/UL (ref 0–0.2)
IMM GRANULOCYTES NFR BLD AUTO: 0 % (ref 0–2)
LYMPHOCYTES # BLD AUTO: 3.05 THOUSANDS/ΜL (ref 0.6–4.47)
LYMPHOCYTES NFR BLD AUTO: 28 % (ref 14–44)
MCH RBC QN AUTO: 28.6 PG (ref 26.8–34.3)
MCHC RBC AUTO-ENTMCNC: 32.8 G/DL (ref 31.4–37.4)
MCV RBC AUTO: 87 FL (ref 82–98)
MONOCYTES # BLD AUTO: 0.47 THOUSAND/ΜL (ref 0.17–1.22)
MONOCYTES NFR BLD AUTO: 4 % (ref 4–12)
NEUTROPHILS # BLD AUTO: 7.15 THOUSANDS/ΜL (ref 1.85–7.62)
NEUTS SEG NFR BLD AUTO: 67 % (ref 43–75)
NRBC BLD AUTO-RTO: 0 /100 WBCS
PLATELET # BLD AUTO: 310 THOUSANDS/UL (ref 149–390)
PMV BLD AUTO: 9.7 FL (ref 8.9–12.7)
POTASSIUM SERPL-SCNC: 3.8 MMOL/L (ref 3.5–5.3)
RBC # BLD AUTO: 6.04 MILLION/UL (ref 3.88–5.62)
SODIUM SERPL-SCNC: 136 MMOL/L (ref 136–145)
WBC # BLD AUTO: 10.84 THOUSAND/UL (ref 4.31–10.16)

## 2018-11-24 PROCEDURE — 99284 EMERGENCY DEPT VISIT MOD MDM: CPT

## 2018-11-24 PROCEDURE — 70491 CT SOFT TISSUE NECK W/DYE: CPT

## 2018-11-24 PROCEDURE — 85025 COMPLETE CBC W/AUTO DIFF WBC: CPT | Performed by: EMERGENCY MEDICINE

## 2018-11-24 PROCEDURE — 36415 COLL VENOUS BLD VENIPUNCTURE: CPT | Performed by: EMERGENCY MEDICINE

## 2018-11-24 PROCEDURE — 80048 BASIC METABOLIC PNL TOTAL CA: CPT | Performed by: EMERGENCY MEDICINE

## 2018-11-24 RX ORDER — CLINDAMYCIN HYDROCHLORIDE 150 MG/1
300 CAPSULE ORAL EVERY 6 HOURS SCHEDULED
Status: DISCONTINUED | OUTPATIENT
Start: 2018-11-24 | End: 2018-11-24

## 2018-11-24 RX ORDER — CLINDAMYCIN HYDROCHLORIDE 150 MG/1
450 CAPSULE ORAL ONCE
Status: COMPLETED | OUTPATIENT
Start: 2018-11-24 | End: 2018-11-24

## 2018-11-24 RX ORDER — IBUPROFEN 600 MG/1
600 TABLET ORAL EVERY 6 HOURS PRN
Qty: 20 TABLET | Refills: 0 | Status: SHIPPED | OUTPATIENT
Start: 2018-11-24 | End: 2022-04-18

## 2018-11-24 RX ORDER — CLINDAMYCIN HYDROCHLORIDE 300 MG/1
300 CAPSULE ORAL EVERY 8 HOURS SCHEDULED
Qty: 21 CAPSULE | Refills: 0 | Status: SHIPPED | OUTPATIENT
Start: 2018-11-24 | End: 2018-12-01

## 2018-11-24 RX ORDER — IBUPROFEN 600 MG/1
600 TABLET ORAL ONCE
Status: COMPLETED | OUTPATIENT
Start: 2018-11-24 | End: 2018-11-24

## 2018-11-24 RX ADMIN — CLINDAMYCIN HYDROCHLORIDE 450 MG: 150 CAPSULE ORAL at 14:27

## 2018-11-24 RX ADMIN — IOHEXOL 85 ML: 350 INJECTION, SOLUTION INTRAVENOUS at 15:53

## 2018-11-24 RX ADMIN — IBUPROFEN 600 MG: 600 TABLET, FILM COATED ORAL at 14:27

## 2018-11-24 NOTE — ED PROVIDER NOTES
History  Chief Complaint   Patient presents with    Dental Pain     Pt has c/o mouth pain for a month and a half  Pt states he feels a "pocket" of fluid built up on his R lower jaw  78-year-old male presents for evaluation of right lower tooth pain has been ongoing for the last 1 5 month  Patient was evaluated by his primary care physician and given a prescription for Augmentin which he completed  Per chart review, patient was advised to go to the dentist within the next few days but he states he has been unable to go  Patient states symptoms resolved after Augmentin but then returned 1 week ago  Pain is isolated to tooth number 32  Denies systemic symptoms of fevers, chills, vomiting, chest pain, shortness of breath, abdominal pain  Patient had a CT soft tissue in 12/2017 which revealed peritonsillar abscess and bilateral mandibular periapical abscesses  Prior to Admission Medications   Prescriptions Last Dose Informant Patient Reported? Taking?   citalopram (CeleXA) 20 mg tablet  Self No Yes   Sig: Take 1 tablet (20 mg total) by mouth daily   Patient taking differently: Take 10 mg by mouth daily     omeprazole (PriLOSEC) 20 mg delayed release capsule   Yes Yes   Sig: Take 20 mg by mouth daily      Facility-Administered Medications: None       Past Medical History:   Diagnosis Date    Anxiety and depression     Psychiatric disorder        Past Surgical History:   Procedure Laterality Date    NO PAST SURGERIES         Family History   Problem Relation Age of Onset    Anxiety disorder Father     Hypertension Father      I have reviewed and agree with the history as documented  Social History   Substance Use Topics    Smoking status: Former Smoker    Smokeless tobacco: Current User     Types: Chew    Alcohol use No        Review of Systems   Constitutional: Negative for chills, diaphoresis and fever  HENT: Positive for dental problem  Negative for congestion and rhinorrhea      Eyes: Negative for pain and visual disturbance  Respiratory: Negative for cough, shortness of breath and wheezing  Cardiovascular: Negative for chest pain and leg swelling  Gastrointestinal: Negative for abdominal pain, diarrhea, nausea and vomiting  Genitourinary: Negative for difficulty urinating, dysuria, frequency and urgency  Musculoskeletal: Negative for back pain and neck pain  Skin: Negative for color change and rash  Neurological: Negative for syncope, numbness and headaches  All other systems reviewed and are negative  Physical Exam  ED Triage Vitals [11/24/18 1357]   Temperature Pulse Respirations Blood Pressure SpO2   98 °F (36 7 °C) 100 18 155/95 95 %      Temp Source Heart Rate Source Patient Position - Orthostatic VS BP Location FiO2 (%)   Oral Monitor Sitting Left arm --      Pain Score       6           Orthostatic Vital Signs  Vitals:    11/24/18 1357 11/24/18 1515 11/24/18 1627   BP: 155/95 149/88 142/84   Pulse: 100 80 80   Patient Position - Orthostatic VS: Sitting Lying Lying       Physical Exam   Constitutional: He is oriented to person, place, and time  He appears well-developed and well-nourished  No distress  HENT:   Head: Normocephalic and atraumatic  Mouth/Throat: No oropharyngeal exudate  Overall poor dentition  Tender around gingiva on right lower teeth  Tender submandibular lymph node  Posterior oropharynx symmetrical without exudates  Soft and symmetrical and sublingual space  Eyes: Conjunctivae and EOM are normal    Neck: Normal range of motion  Neck supple  Cardiovascular: Normal rate and regular rhythm  Pulmonary/Chest: Effort normal and breath sounds normal  No respiratory distress  He has no wheezes  He has no rales  Abdominal: Soft  Bowel sounds are normal  There is no tenderness  There is no guarding  Musculoskeletal: Normal range of motion  He exhibits no edema or tenderness     Neurological: He is alert and oriented to person, place, and time  No cranial nerve deficit  Skin: Skin is warm  He is not diaphoretic  No erythema  Psychiatric: He has a normal mood and affect  His behavior is normal    Nursing note and vitals reviewed  ED Medications  Medications   ibuprofen (MOTRIN) tablet 600 mg (600 mg Oral Given 11/24/18 1427)   clindamycin (CLEOCIN) capsule 450 mg (450 mg Oral Given 11/24/18 1427)   iohexol (OMNIPAQUE) 350 MG/ML injection (MULTI-DOSE) 85 mL (85 mL Intravenous Given 11/24/18 1553)       Diagnostic Studies  Results Reviewed     Procedure Component Value Units Date/Time    Basic metabolic panel [[id number]]  (Abnormal) Collected:  11/24/18 1459    Lab Status:  Final result Specimen:  Blood from Arm, Left Updated:  11/24/18 1523     Sodium 136 mmol/L      Potassium 3 8 mmol/L      Chloride 103 mmol/L      CO2 30 mmol/L      ANION GAP 3 (L) mmol/L      BUN 12 mg/dL      Creatinine 0 96 mg/dL      Glucose 135 mg/dL      Calcium 9 7 mg/dL      eGFR 103 ml/min/1 73sq m     Narrative:         National Kidney Disease Education Program recommendations are as follows:  GFR calculation is accurate only with a steady state creatinine  Chronic Kidney disease less than 60 ml/min/1 73 sq  meters  Kidney failure less than 15 ml/min/1 73 sq  meters      CBC and differential [[id number]]  (Abnormal) Collected:  11/24/18 1459    Lab Status:  Final result Specimen:  Blood from Arm, Left Updated:  11/24/18 1512     WBC 10 84 (H) Thousand/uL      RBC 6 04 (H) Million/uL      Hemoglobin 17 3 (H) g/dL      Hematocrit 52 8 (H) %      MCV 87 fL      MCH 28 6 pg      MCHC 32 8 g/dL      RDW 12 9 %      MPV 9 7 fL      Platelets 432 Thousands/uL      nRBC 0 /100 WBCs      Neutrophils Relative 67 %      Immat GRANS % 0 %      Lymphocytes Relative 28 %      Monocytes Relative 4 %      Eosinophils Relative 1 %      Basophils Relative 0 %      Neutrophils Absolute 7 15 Thousands/µL      Immature Grans Absolute 0 03 Thousand/uL      Lymphocytes Absolute 3 05 Thousands/µL      Monocytes Absolute 0 47 Thousand/µL      Eosinophils Absolute 0 11 Thousand/µL      Basophils Absolute 0 03 Thousands/µL                  CT soft tissue neck with contrast   Final Result by Lorna Gtz MD (11/24 1642)      Multiple periapical abscesses involving the roots of the maxillary and mandibular teeth, as described  No evidence of subperiosteal or soft tissue abscess or cellulitis  Workstation performed: [id number]               Procedures  Procedures      Phone Consults  ED Phone Contact    ED Course                               MDM  Number of Diagnoses or Management Options  Diagnosis management comments: 78-year-old male presenting with right lower tooth pain  Given history of periapical abscesses 1 year ago without any further treatment besides antibiotics, will obtain another CT soft tissue to evaluate for further pathology  Administer antibiotics, pain control  CritCare Time    Disposition  Final diagnoses:   Periapical abscess     Time reflects when diagnosis was documented in both MDM as applicable and the Disposition within this note     Time User Action Codes Description Comment    11/24/2018  4:44 PM Burtis Box Add [K04 7] Periapical abscess       ED Disposition     ED Disposition Condition Comment    Discharge  [address] discharge to home/self care      Condition at discharge: Stable        Follow-up Information     Follow up With Specialties Details Why Contact Info Additional [address]  In 2 days For further evaluation [facility], [facility], [facility]     [address] [place] [address] Emergency Department Emergency Medicine  If symptoms worsen [address]  [phone]  ED, [address] [place], [place], [address]          Discharge Medication List as of 11/24/2018  4:45 PM      START taking these medications    Details   clindamycin (CLEOCIN) 300 MG capsule Take 1 capsule (300 mg total) by mouth every 8 (eight) hours for 7 days, Starting Sat 11/24/2018, Until Sat 12/1/2018, Print      ibuprofen (MOTRIN) 600 mg tablet Take 1 tablet (600 mg total) by mouth every 6 (six) hours as needed for mild pain, Starting Sat 11/24/2018, Print         CONTINUE these medications which have NOT CHANGED    Details   citalopram (CeleXA) 20 mg tablet Take 1 tablet (20 mg total) by mouth daily, Starting Wed 4/25/2018, Normal      omeprazole (PriLOSEC) 20 mg delayed release capsule Take 20 mg by mouth daily, Historical Med           No discharge procedures on file  ED Provider  Attending physically available and evaluated Yennyjulio Sue  MELYSSA managed the patient along with the ED Attending      Electronically Signed by         Qing Beltran DO  11/25/18 0111

## 2018-11-24 NOTE — ED ATTENDING ATTESTATION
Elizabeth Romero MD, saw and evaluated the patient  I have discussed the patient with the resident/non-physician practitioner and agree with the resident's/non-physician practitioner's findings, Plan of Care, and MDM as documented in the resident's/non-physician practitioner's note, except where noted  All available labs and Radiology studies were reviewed  At this point I agree with the current assessment done in the Emergency Department  I have conducted an independent evaluation of this patient a history and physical is as follows:    Patient presents with dental pain for 1 5 months on the R side  Treated by PCP with Augmentin, had some improvement but has since recurred  No additional complaints  Exam: AAOx3, NAD, poor dentition, submandibular lymph node  A/P: Dental pain  Given symptoms, lymph node, and hx of abscess will obtain CT  Abx      Critical Care Time  CritCare Time    Procedures

## 2018-11-24 NOTE — DISCHARGE INSTRUCTIONS
Dental Abscess   WHAT YOU NEED TO KNOW:   A dental abscess is a collection of pus in or around a tooth  A dental abscess is caused by bacteria  The bacteria usually enter the tooth when the enamel (outer part of the tooth) is damaged by tooth decay  Bacteria may also enter the tooth through a break or chip in the tooth, or a cut in the gum  Food particles that are stuck between the teeth for a long time may also lead to an abscess  DISCHARGE INSTRUCTIONS:   Return to the emergency department if:   · You have severe pain  · You have trouble breathing because of pain or swelling  Contact your healthcare provider if:   · Your symptoms get worse, even after treatment  · Your mouth is bleeding  · You cannot eat or drink because of pain or swelling  · Your abscess returns  · You have an injury that causes a crack in your tooth  · You have questions or concerns about your condition or care  Medicines: You may  need any of the following:  · Antibiotics  help treat a bacterial infection  · NSAIDs , such as ibuprofen, help decrease swelling, pain, and fever  This medicine is available with or without a doctor's order  NSAIDs can cause stomach bleeding or kidney problems in certain people  If you take blood thinner medicine, always ask your healthcare provider if NSAIDs are safe for you  Always read the medicine label and follow directions  · Acetaminophen  decreases pain and fever  It is available without a doctor's order  Ask how much to take and how often to take it  Follow directions  Read the labels of all other medicines you are using to see if they also contain acetaminophen, or ask your doctor or pharmacist  Acetaminophen can cause liver damage if not taken correctly  Do not use more than 4 grams (4,000 milligrams) total of acetaminophen in one day  · Prescription pain medicine  may be given  Ask your healthcare provider how to take this medicine safely   Some prescription pain medicines contain acetaminophen  Do not take other medicines that contain acetaminophen without talking to your healthcare provider  Too much acetaminophen may cause liver damage  Prescription pain medicine may cause constipation  Ask your healthcare provider how to prevent or treat constipation  · Take your medicine as directed  Contact your healthcare provider if you think your medicine is not helping or if you have side effects  Tell him of her if you are allergic to any medicine  Keep a list of the medicines, vitamins, and herbs you take  Include the amounts, and when and why you take them  Bring the list or the pill bottles to follow-up visits  Carry your medicine list with you in case of an emergency  Self-care:   · Rinse your mouth every 2 hours with salt water  This will help keep the area clean  · Gently brush your teeth twice a day with a soft tooth brush  This will help keep the area clean  · Eat soft foods as directed  Soft foods may cause less pain  Examples include applesauce, yogurt, and cooked pasta  Ask your healthcare provider how long to follow this instruction  · Apply a warm compress to your tooth or gum  Use a cotton ball or gauze soaked in warm water  Remove the compress in 10 minutes or when it becomes cool  Repeat 3 times a day  Prevent another abscess:   · Brush your teeth at least 2 times a day with fluoride toothpaste  · Use dental floss to clean between your teeth at least once a day  · Rinse your mouth with water or mouthwash after meals and snacks  · Chew sugarless gum after meals and snacks  · Limit foods that are sticky and high in sugar such as raisons  Also limit drinks high in sugar, such as soda  · See your dentist every 6 months for dental cleanings and oral exams  Follow up with your healthcare provider in 24 hours: Your healthcare provider will need to check your teeth and gums   Write down your questions so you remember to ask them during your visits  © 2017 [employer] Information is for End User's use only and may not be sold, redistributed or otherwise used for commercial purposes  All illustrations and images included in CareNotes® are the copyrighted property of A [employer]  or Abhijeet Pelaez  The above information is an  only  It is not intended as medical advice for individual conditions or treatments  Talk to your doctor, nurse or pharmacist before following any medical regimen to see if it is safe and effective for you

## 2018-12-11 ENCOUNTER — OFFICE VISIT (OUTPATIENT)
Dept: FAMILY MEDICINE CLINIC | Facility: CLINIC | Age: 33
End: 2018-12-11

## 2018-12-11 VITALS
DIASTOLIC BLOOD PRESSURE: 78 MMHG | OXYGEN SATURATION: 98 % | WEIGHT: 210 LBS | SYSTOLIC BLOOD PRESSURE: 128 MMHG | RESPIRATION RATE: 18 BRPM | BODY MASS INDEX: 28.48 KG/M2 | HEART RATE: 71 BPM

## 2018-12-11 DIAGNOSIS — IMO0001 COLD: Primary | ICD-10-CM

## 2018-12-11 PROCEDURE — 99213 OFFICE O/P EST LOW 20 MIN: CPT | Performed by: NURSE PRACTITIONER

## 2018-12-11 NOTE — LETTER
December 11, 2018     Patient: Anastasia Mccrary   YOB: 1985   Date of Visit: 12/11/2018       To Whom it May Concern:    Anastasia Mccrary is under my professional care  He was seen in my office on 12/11/2018  He may return to work on 12/12/2018  If you have any questions or concerns, please don't hesitate to call           Sincerely,          Celia Hanna NP        CC: No Recipients

## 2018-12-11 NOTE — PROGRESS NOTES
Assessment/Plan:  1  Take mucinex  2  F/u if condition changes/worsens           Diagnoses and all orders for this visit:    Cold          Subjective:      Patient ID: Teo Garrison is a 28 y o  male  58-year-old male presents with flu-like symptoms since last week  Felt achy, sore throat  Some coughing  Dry/phlegm cough  Took OTC  Helped  The following portions of the patient's history were reviewed and updated as appropriate: allergies and current medications  Review of Systems   Constitutional: Positive for fatigue  HENT: Positive for congestion  Respiratory: Positive for cough  Cardiovascular: Negative  Objective:      /78   Pulse 71   Resp 18   Wt 95 3 kg (210 lb)   SpO2 98%   BMI 28 48 kg/m²          Physical Exam   Constitutional: He appears well-developed and well-nourished  HENT:   Head: Normocephalic and atraumatic  Right Ear: External ear normal    Left Ear: External ear normal    Nose: Nose normal    Mouth/Throat: Oropharynx is clear and moist    Cardiovascular: Normal rate, regular rhythm and normal heart sounds      Pulmonary/Chest: Effort normal and breath sounds normal

## 2019-03-06 ENCOUNTER — OFFICE VISIT (OUTPATIENT)
Dept: FAMILY MEDICINE CLINIC | Facility: CLINIC | Age: 34
End: 2019-03-06

## 2019-03-06 VITALS
HEART RATE: 80 BPM | WEIGHT: 222.19 LBS | OXYGEN SATURATION: 97 % | HEIGHT: 72 IN | DIASTOLIC BLOOD PRESSURE: 88 MMHG | SYSTOLIC BLOOD PRESSURE: 126 MMHG | RESPIRATION RATE: 18 BRPM | BODY MASS INDEX: 30.09 KG/M2 | TEMPERATURE: 97.1 F

## 2019-03-06 DIAGNOSIS — M62.838 MUSCLE SPASM: Primary | ICD-10-CM

## 2019-03-06 PROCEDURE — 99213 OFFICE O/P EST LOW 20 MIN: CPT | Performed by: NURSE PRACTITIONER

## 2019-03-06 RX ORDER — CYCLOBENZAPRINE HCL 10 MG
10 TABLET ORAL 3 TIMES DAILY PRN
Qty: 15 TABLET | Refills: 0 | Status: SHIPPED | OUTPATIENT
Start: 2019-03-06 | End: 2020-04-01

## 2019-03-06 NOTE — LETTER
March 6, 2019     Patient: Ilya Allen   YOB: 1985   Date of Visit: 3/6/2019       To Whom it May Concern:    Ilya Allen is under my professional care  He was seen in my office on 3/6/2019  He may return to work on 3/11/2019  If you have any questions or concerns, please don't hesitate to call           Sincerely,          Anthony Gusman NP        CC: No Recipients

## 2019-03-06 NOTE — PROGRESS NOTES
Assessment/Plan:  1  Take NSAID for symptom relief  2  Take medication as prescribed  3  F/u if condition changes/worsens       Diagnoses and all orders for this visit:    Muscle spasm  -     cyclobenzaprine (FLEXERIL) 10 mg tablet; Take 1 tablet (10 mg total) by mouth 3 (three) times a day as needed for muscle spasms for up to 5 days          Subjective:      Patient ID: Chaitanya Clarke is a 28 y o  male  A 26-year-old male presents with right-sided back pain since last night  Reports the pain is like a spasm in the back  Radiation down the leg  Took ibuprofen  Helped a little  Pain is 5/10  Hurts with movement  Feels better when he stands  The following portions of the patient's history were reviewed and updated as appropriate: allergies and current medications  Review of Systems   Constitutional: Negative  Musculoskeletal: Positive for back pain  Objective:      /88   Pulse 80   Temp (!) 97 1 °F (36 2 °C)   Resp 18   Ht 6' (1 829 m)   Wt 101 kg (222 lb 3 oz)   SpO2 97%   BMI 30 13 kg/m²          Physical Exam   Constitutional: He appears well-developed and well-nourished     Musculoskeletal:   Pain with forward flexion

## 2019-03-06 NOTE — LETTER
March 6, 2019     Patient: Georgette Salgado   YOB: 1985   Date of Visit: 3/6/2019       To Whom It May Concern: It is my medical opinion that Georgette Salgado {Work release (duty restriction):54814}  If you have any questions or concerns, please don't hesitate to call           Sincerely,        Michelle Escamilla NP    CC: No Recipients

## 2019-06-17 ENCOUNTER — OFFICE VISIT (OUTPATIENT)
Dept: FAMILY MEDICINE CLINIC | Facility: CLINIC | Age: 34
End: 2019-06-17

## 2019-06-17 VITALS
TEMPERATURE: 97.8 F | BODY MASS INDEX: 30.02 KG/M2 | OXYGEN SATURATION: 95 % | WEIGHT: 221.38 LBS | HEART RATE: 99 BPM | DIASTOLIC BLOOD PRESSURE: 90 MMHG | SYSTOLIC BLOOD PRESSURE: 134 MMHG

## 2019-06-17 DIAGNOSIS — Z72.0 CHEWING TOBACCO USE: ICD-10-CM

## 2019-06-17 DIAGNOSIS — K05.30 PERIODONTITIS: ICD-10-CM

## 2019-06-17 DIAGNOSIS — F41.9 ANXIETY: ICD-10-CM

## 2019-06-17 DIAGNOSIS — K05.10 GINGIVITIS: ICD-10-CM

## 2019-06-17 DIAGNOSIS — K04.7 TOOTH ABSCESS: Primary | ICD-10-CM

## 2019-06-17 PROCEDURE — 99213 OFFICE O/P EST LOW 20 MIN: CPT | Performed by: FAMILY MEDICINE

## 2019-06-17 RX ORDER — CITALOPRAM 20 MG/1
20 TABLET ORAL DAILY
Qty: 60 TABLET | Refills: 1 | Status: SHIPPED | OUTPATIENT
Start: 2019-06-17 | End: 2020-05-28

## 2019-06-17 RX ORDER — AMOXICILLIN AND CLAVULANATE POTASSIUM 875; 125 MG/1; MG/1
1 TABLET, FILM COATED ORAL EVERY 12 HOURS SCHEDULED
Qty: 14 TABLET | Refills: 1 | Status: SHIPPED | OUTPATIENT
Start: 2019-06-17 | End: 2019-06-24

## 2020-02-06 ENCOUNTER — OFFICE VISIT (OUTPATIENT)
Dept: FAMILY MEDICINE CLINIC | Facility: CLINIC | Age: 35
End: 2020-02-06

## 2020-02-06 ENCOUNTER — PATIENT OUTREACH (OUTPATIENT)
Dept: FAMILY MEDICINE CLINIC | Facility: CLINIC | Age: 35
End: 2020-02-06

## 2020-02-06 VITALS
WEIGHT: 221 LBS | RESPIRATION RATE: 20 BRPM | DIASTOLIC BLOOD PRESSURE: 72 MMHG | OXYGEN SATURATION: 98 % | BODY MASS INDEX: 29.97 KG/M2 | TEMPERATURE: 98.3 F | HEART RATE: 108 BPM | SYSTOLIC BLOOD PRESSURE: 130 MMHG

## 2020-02-06 DIAGNOSIS — K02.9 DENTAL CARIES NOTED ON EXAMINATION: ICD-10-CM

## 2020-02-06 DIAGNOSIS — Z59.89 INSURANCE COVERAGE PROBLEMS: ICD-10-CM

## 2020-02-06 DIAGNOSIS — K05.30 PERIODONTITIS: Primary | ICD-10-CM

## 2020-02-06 PROCEDURE — 99214 OFFICE O/P EST MOD 30 MIN: CPT | Performed by: FAMILY MEDICINE

## 2020-02-06 RX ORDER — AMOXICILLIN AND CLAVULANATE POTASSIUM 875; 125 MG/1; MG/1
1 TABLET, FILM COATED ORAL EVERY 12 HOURS SCHEDULED
Qty: 14 TABLET | Refills: 0 | Status: SHIPPED | OUTPATIENT
Start: 2020-02-06 | End: 2020-02-13

## 2020-02-06 SDOH — ECONOMIC STABILITY - INCOME SECURITY: OTHER PROBLEMS RELATED TO HOUSING AND ECONOMIC CIRCUMSTANCES: Z59.89

## 2020-02-06 NOTE — PROGRESS NOTES
OP CM met with pt in regards to not having insurance  Pt states he works at [employer] but [employer] for two more months  Pt applied for medicaid ten months ago but was over income  Pt needs significant dental work  Pt referred to [facility] [place]  Pt is extremely nervous and states he has major anxiety about calling [facility]  Offeredf to call with pt and he was agreeable  We called to [facility] and left a message requesting a call back for an appt  Pt also advised to apply for [facility] care  Pt is not interested in OP MH therapy  Pt states he has tried therapy before and did not find it helpful  Discussed some techniques like deep breathing and meditation apps  OP CM will continue to follow

## 2020-02-13 ENCOUNTER — PATIENT OUTREACH (OUTPATIENT)
Dept: FAMILY MEDICINE CLINIC | Facility: CLINIC | Age: 35
End: 2020-02-13

## 2020-02-13 NOTE — PROGRESS NOTES
OP CM called and LM for pt to see if he was able to make an appt at the dental clinic  Will call pt again to follow up

## 2020-02-20 ENCOUNTER — PATIENT OUTREACH (OUTPATIENT)
Dept: FAMILY MEDICINE CLINIC | Facility: CLINIC | Age: 35
End: 2020-02-20

## 2020-02-20 NOTE — PROGRESS NOTES
OP CM called to pt and he states he called [facility] in [place] three times and left message  I called to [facility] in [place] and spoke to Mechelle and they do not have an opening until June  Call to [facility] in [facility] and spoke to Jessica Skinner and they do sliding scale and can see pts usually can be scheduled in two weeks  Call back to pt and gave pt the phone number

## 2020-03-03 ENCOUNTER — PATIENT OUTREACH (OUTPATIENT)
Dept: FAMILY MEDICINE CLINIC | Facility: CLINIC | Age: 35
End: 2020-03-03

## 2020-03-30 ENCOUNTER — TELEMEDICINE (OUTPATIENT)
Dept: FAMILY MEDICINE CLINIC | Facility: CLINIC | Age: 35
End: 2020-03-30
Payer: COMMERCIAL

## 2020-03-30 ENCOUNTER — TELEPHONE (OUTPATIENT)
Dept: FAMILY MEDICINE CLINIC | Facility: CLINIC | Age: 35
End: 2020-03-30

## 2020-03-30 DIAGNOSIS — R07.89 FEELING OF CHEST TIGHTNESS: Primary | ICD-10-CM

## 2020-03-30 DIAGNOSIS — F41.8 ANXIETY ABOUT HEALTH: ICD-10-CM

## 2020-03-30 PROCEDURE — G2012 BRIEF CHECK IN BY MD/QHP: HCPCS | Performed by: FAMILY MEDICINE

## 2020-03-30 NOTE — PROGRESS NOTES
COVID-19 Virtual Visit     This virtual check-in was done via telephone  Encounter provider Jay Weeks DO    Provider located at [address] [place] [phone]    Recent Visits  No visits were found meeting these conditions  Showing recent visits within past 7 days and meeting all other requirements     Today's Visits  Date Type Provider Dept   03/30/20 Telemedicine Jay Ar, DO [facility]   03/30/20 Telephone [facility]   Showing today's visits and meeting all other requirements     Future Appointments  Date Type Provider Dept   03/30/20 Telemedicine [place] Ar, DO [facility]   Showing future appointments within next 150 days and meeting all other requirements        Patient agrees to participate in a virtual check in via telephone or video visit instead of presenting to the office to address urgent/immediate medical needs  Patient is aware this is a billable service  After connecting through telephone, the patient was identified by name and date of birth  Autumn Dailey was informed that this was a telemedicine visit and that the exam was being conducted confidentially over secure lines  My office door was closed  No one else was in the room  Autumn Dailey acknowledged consent and understanding of privacy and security of the telemedicine visit  I informed the patient that I have reviewed his record in Epic and presented the opportunity for him to ask any questions regarding the visit today  The patient agreed to participate  Autumn Dailey is a 27 y o  male who is concerned about COVID-19  He reports cough  He has not traveled outside the [place]  within the last 14 days    He has not had contact with a person who is under investigation for or who is positive for COVID-19 within the last 14 days   He has not been hospitalized recently for fever and/or lower respiratory symptoms  Complains of allergy symptoms for the last week  Prior smoker quit 3 years    ago, no history of chronic lung diseases  About 4 days ago started feeling a    sensation of chest tightness, does admit to history of anxiety but did feel    his breathing was not as good as usual  He is very concerned as he has never had these symptoms of chest tightness/heaviness before  Does have an    intermittent dry cough  Feeling very anxious  He has been home since    3/15/2020  His partner works in healthcare, assistant at home for mentally    disabled, where there have been no confirmed or suspected cases  They have been doing their best to practice appropriate hygiene and cleaning of the home  He has been taking his own temperature and reports max temp of 99  Past Medical History:   Diagnosis Date    Anxiety and depression     Psychiatric disorder        Past Surgical History:   Procedure Laterality Date    NO PAST SURGERIES         Current Outpatient Medications   Medication Sig Dispense Refill    citalopram (CeleXA) 20 mg tablet Take 1 tablet (20 mg total) by mouth daily 60 tablet 1    cyclobenzaprine (FLEXERIL) 10 mg tablet Take 1 tablet (10 mg total) by mouth 3 (three) times a day as needed for muscle spasms for up to 5 days 15 tablet 0    ibuprofen (MOTRIN) 600 mg tablet Take 1 tablet (600 mg total) by mouth every 6 (six) hours as needed for mild pain 20 tablet 0    omeprazole (PriLOSEC) 20 mg delayed release capsule Take 20 mg by mouth daily       No current facility-administered medications for this visit  No Known Allergies      Disposition:      After clarifying the patient's history, my suspicion for COVID-19 infection is low  Patient sounds very anxious over the phone and reports these episodes of chest tightness he notices more when anxious than if he is sitting quietly and relaxing  Advised he continue to monitor temperature and monitor his symptoms and remain at home  Encouraged appropriate social distancing and hygiene practices  Will plan to check in with him or have him call us back tomorrow to see how he is feeling  Discussed with attending physician Dr Roshan Kohli  I spent 20 minutes with the patient during this virtual check-in visit

## 2020-04-01 ENCOUNTER — HOSPITAL ENCOUNTER (EMERGENCY)
Facility: HOSPITAL | Age: 35
Discharge: HOME | End: 2020-04-01
Attending: EMERGENCY MEDICINE | Admitting: EMERGENCY MEDICINE
Payer: COMMERCIAL

## 2020-04-01 VITALS
BODY MASS INDEX: 29.7 KG/M2 | WEIGHT: 219 LBS | DIASTOLIC BLOOD PRESSURE: 106 MMHG | OXYGEN SATURATION: 96 % | TEMPERATURE: 97.9 F | SYSTOLIC BLOOD PRESSURE: 174 MMHG | RESPIRATION RATE: 20 BRPM | HEART RATE: 123 BPM

## 2020-04-01 DIAGNOSIS — F41.9 ANXIETY: Primary | ICD-10-CM

## 2020-04-01 PROCEDURE — 99284 EMERGENCY DEPT VISIT MOD MDM: CPT

## 2020-04-01 PROCEDURE — 99283 EMERGENCY DEPT VISIT LOW MDM: CPT | Performed by: EMERGENCY MEDICINE

## 2020-04-07 ENCOUNTER — PATIENT OUTREACH (OUTPATIENT)
Dept: FAMILY MEDICINE CLINIC | Facility: CLINIC | Age: 35
End: 2020-04-07

## 2020-05-26 ENCOUNTER — PATIENT OUTREACH (OUTPATIENT)
Dept: FAMILY MEDICINE CLINIC | Facility: CLINIC | Age: 35
End: 2020-05-26

## 2020-05-28 ENCOUNTER — TELEMEDICINE (OUTPATIENT)
Dept: FAMILY MEDICINE CLINIC | Facility: CLINIC | Age: 35
End: 2020-05-28
Payer: COMMERCIAL

## 2020-05-28 DIAGNOSIS — K04.7 TOOTH INFECTION: Primary | ICD-10-CM

## 2020-05-28 DIAGNOSIS — F41.9 ANXIETY: ICD-10-CM

## 2020-05-28 PROCEDURE — 99213 OFFICE O/P EST LOW 20 MIN: CPT | Performed by: FAMILY MEDICINE

## 2020-05-28 RX ORDER — AMOXICILLIN AND CLAVULANATE POTASSIUM 875; 125 MG/1; MG/1
1 TABLET, FILM COATED ORAL EVERY 12 HOURS SCHEDULED
Qty: 14 TABLET | Refills: 0 | Status: SHIPPED | OUTPATIENT
Start: 2020-05-28 | End: 2020-06-04

## 2020-05-28 RX ORDER — CITALOPRAM 10 MG/1
10 TABLET ORAL DAILY
Qty: 30 TABLET | Refills: 0 | Status: SHIPPED | OUTPATIENT
Start: 2020-05-28 | End: 2020-07-31 | Stop reason: SDUPTHER

## 2020-06-30 ENCOUNTER — PATIENT OUTREACH (OUTPATIENT)
Dept: FAMILY MEDICINE CLINIC | Facility: CLINIC | Age: 35
End: 2020-06-30

## 2020-07-08 ENCOUNTER — PATIENT OUTREACH (OUTPATIENT)
Dept: FAMILY MEDICINE CLINIC | Facility: CLINIC | Age: 35
End: 2020-07-08

## 2020-07-08 NOTE — PROGRESS NOTES
OP CM called to pt in regards to mental health follow up for anxiety  Pt was uninsured but now has health insurance  Will remain available

## 2020-07-29 DIAGNOSIS — F41.9 ANXIETY: Primary | ICD-10-CM

## 2020-07-31 RX ORDER — CITALOPRAM 10 MG/1
10 TABLET ORAL DAILY
Qty: 30 TABLET | Refills: 0 | Status: SHIPPED | OUTPATIENT
Start: 2020-07-31 | End: 2020-10-26 | Stop reason: SDUPTHER

## 2020-10-26 DIAGNOSIS — F41.9 ANXIETY: ICD-10-CM

## 2020-10-26 RX ORDER — CITALOPRAM 10 MG/1
10 TABLET ORAL DAILY
Qty: 30 TABLET | Refills: 0 | Status: SHIPPED | OUTPATIENT
Start: 2020-10-26 | End: 2021-01-21

## 2021-01-20 DIAGNOSIS — F41.9 ANXIETY: ICD-10-CM

## 2021-01-21 ENCOUNTER — TELEPHONE (OUTPATIENT)
Dept: FAMILY MEDICINE CLINIC | Facility: CLINIC | Age: 36
End: 2021-01-21

## 2021-01-21 DIAGNOSIS — F41.9 ANXIETY: ICD-10-CM

## 2021-01-21 RX ORDER — CITALOPRAM 10 MG/1
10 TABLET ORAL DAILY
Qty: 30 TABLET | Refills: 0 | OUTPATIENT
Start: 2021-01-21 | End: 2021-02-20

## 2021-01-21 RX ORDER — CITALOPRAM 10 MG/1
TABLET ORAL
Qty: 30 TABLET | Refills: 0 | Status: SHIPPED | OUTPATIENT
Start: 2021-01-21 | End: 2021-01-22 | Stop reason: SDUPTHER

## 2021-01-21 NOTE — TELEPHONE ENCOUNTER
Left message for Patient to call the office to Schedule appointment, informed of office hours and phone number

## 2021-01-22 ENCOUNTER — TELEMEDICINE (OUTPATIENT)
Dept: FAMILY MEDICINE CLINIC | Facility: CLINIC | Age: 36
End: 2021-01-22
Payer: COMMERCIAL

## 2021-01-22 DIAGNOSIS — Z76.0 MEDICATION REFILL: Primary | ICD-10-CM

## 2021-01-22 DIAGNOSIS — F41.9 ANXIETY: ICD-10-CM

## 2021-01-22 PROCEDURE — 99213 OFFICE O/P EST LOW 20 MIN: CPT | Performed by: FAMILY MEDICINE

## 2021-01-22 RX ORDER — CITALOPRAM 10 MG/1
10 TABLET ORAL DAILY
Qty: 30 TABLET | Refills: 2 | Status: SHIPPED | OUTPATIENT
Start: 2021-01-22 | End: 2021-04-14 | Stop reason: SDUPTHER

## 2021-01-22 NOTE — PROGRESS NOTES
Virtual Brief Visit    Assessment/Plan:    Problem List Items Addressed This Visit        Other    Anxiety    Relevant Medications    citalopram (CeleXA) 10 mg tablet      Other Visit Diagnoses     Medication refill    -  Primary        - med refilled, pt doing well no complaints  Reason for visit is   Chief Complaint   Patient presents with    Virtual Brief Visit        Encounter provider Kwaku Ramos MD    Provider located at [address] [place] [phone]    Recent Visits  Date Type Provider Dept   01/21/21 Telephone Nicky Barrientos 58 recent visits within past 7 days and meeting all other requirements     Today's Visits  Date Type Provider Dept   01/22/21 Telemedicine Kwaku Ramos MD [facility]   Showing today's visits and meeting all other requirements     Future Appointments  No visits were found meeting these conditions  Showing future appointments within next 150 days and meeting all other requirements        After connecting through telephone, the patient was identified by name and date of birth  Gabriella Morales was informed that this is a telemedicine visit and that the visit is being conducted through telephone  My office door was closed  No one else was in the room  He acknowledged consent and understanding of privacy and security of the platform  The patient has agreed to participate and understands he can discontinue the visit at any time  Patient is aware this is a billable service  Subjective    Gabriella Morales is a 38 y o  male presents for celexa refill  Doing very well on it for anxiety  No complaints  Occasional night sanchez when he tried a lower dose  No chest pain, no fatigue, no sexual side effects, no change in bowel or bladder  Pt doing well at this time     HPI     Past Medical History:   Diagnosis Date    Anxiety and depression     Psychiatric disorder        Past Surgical History:   Procedure Laterality Date    NO PAST SURGERIES         Current Outpatient Medications   Medication Sig Dispense Refill    citalopram (CeleXA) 10 mg tablet Take 1 tablet (10 mg total) by mouth daily 30 tablet 2    ibuprofen (MOTRIN) 600 mg tablet Take 1 tablet (600 mg total) by mouth every 6 (six) hours as needed for mild pain (Patient not taking: Reported on 4/1/2020) 20 tablet 0    omeprazole (PriLOSEC) 20 mg delayed release capsule Take 20 mg by mouth daily       No current facility-administered medications for this visit  No Known Allergies    Review of Systems    There were no vitals filed for this visit  I spent 15 minutes directly with the patient during this visit    VIRTUAL VISIT DISCLAIMER    Elvira Ladd acknowledges that he has consented to an online visit or consultation  He understands that the online visit is based solely on information provided by him, and that, in the absence of a face-to-face physical evaluation by the physician, the diagnosis he receives is both limited and provisional in terms of accuracy and completeness  This is not intended to replace a full medical face-to-face evaluation by the physician  Elvira Ladd understands and accepts these terms

## 2021-02-28 ENCOUNTER — HOSPITAL ENCOUNTER (EMERGENCY)
Facility: HOSPITAL | Age: 36
Discharge: HOME | End: 2021-02-28
Attending: EMERGENCY MEDICINE
Payer: COMMERCIAL

## 2021-02-28 ENCOUNTER — APPOINTMENT (EMERGENCY)
Dept: RADIOLOGY | Facility: HOSPITAL | Age: 36
End: 2021-02-28
Attending: EMERGENCY MEDICINE
Payer: COMMERCIAL

## 2021-02-28 VITALS
WEIGHT: 237.22 LBS | OXYGEN SATURATION: 93 % | BODY MASS INDEX: 32.13 KG/M2 | RESPIRATION RATE: 16 BRPM | HEART RATE: 96 BPM | DIASTOLIC BLOOD PRESSURE: 63 MMHG | TEMPERATURE: 96.5 F | HEIGHT: 72 IN | SYSTOLIC BLOOD PRESSURE: 144 MMHG

## 2021-02-28 DIAGNOSIS — R00.2 PALPITATIONS: Primary | ICD-10-CM

## 2021-02-28 DIAGNOSIS — F41.9 ANXIETY: ICD-10-CM

## 2021-02-28 LAB
ALBUMIN SERPL BCP-MCNC: 4.1 G/DL (ref 3.5–5)
ALP SERPL-CCNC: 84 U/L (ref 46–116)
ALT SERPL W P-5'-P-CCNC: 75 U/L (ref 12–78)
ANION GAP SERPL CALCULATED.3IONS-SCNC: 9 MMOL/L (ref 4–13)
AST SERPL W P-5'-P-CCNC: 53 U/L (ref 5–45)
BASOPHILS # BLD AUTO: 0.04 THOUSANDS/ΜL (ref 0–0.1)
BASOPHILS NFR BLD AUTO: 0 % (ref 0–1)
BILIRUB SERPL-MCNC: 0.4 MG/DL (ref 0.2–1)
BUN SERPL-MCNC: 12 MG/DL (ref 5–25)
CALCIUM SERPL-MCNC: 9 MG/DL (ref 8.3–10.1)
CHLORIDE SERPL-SCNC: 102 MMOL/L (ref 100–108)
CO2 SERPL-SCNC: 26 MMOL/L (ref 21–32)
CREAT SERPL-MCNC: 1.02 MG/DL (ref 0.6–1.3)
EOSINOPHIL # BLD AUTO: 0.2 THOUSAND/ΜL (ref 0–0.61)
EOSINOPHIL NFR BLD AUTO: 2 % (ref 0–6)
ERYTHROCYTE [DISTWIDTH] IN BLOOD BY AUTOMATED COUNT: 12.6 % (ref 11.6–15.1)
GFR SERPL CREATININE-BSD FRML MDRD: 94 ML/MIN/1.73SQ M
GLUCOSE SERPL-MCNC: 143 MG/DL (ref 65–140)
HCT VFR BLD AUTO: 55.2 % (ref 36.5–49.3)
HGB BLD-MCNC: 17.3 G/DL (ref 12–17)
IMM GRANULOCYTES # BLD AUTO: 0.02 THOUSAND/UL (ref 0–0.2)
IMM GRANULOCYTES NFR BLD AUTO: 0 % (ref 0–2)
LYMPHOCYTES # BLD AUTO: 3.72 THOUSANDS/ΜL (ref 0.6–4.47)
LYMPHOCYTES NFR BLD AUTO: 38 % (ref 14–44)
MCH RBC QN AUTO: 27.6 PG (ref 26.8–34.3)
MCHC RBC AUTO-ENTMCNC: 31.3 G/DL (ref 31.4–37.4)
MCV RBC AUTO: 88 FL (ref 82–98)
MONOCYTES # BLD AUTO: 0.47 THOUSAND/ΜL (ref 0.17–1.22)
MONOCYTES NFR BLD AUTO: 5 % (ref 4–12)
NEUTROPHILS # BLD AUTO: 5.23 THOUSANDS/ΜL (ref 1.85–7.62)
NEUTS SEG NFR BLD AUTO: 55 % (ref 43–75)
NRBC BLD AUTO-RTO: 0 /100 WBCS
PLATELET # BLD AUTO: 284 THOUSANDS/UL (ref 149–390)
PMV BLD AUTO: 10.3 FL (ref 8.9–12.7)
POTASSIUM SERPL-SCNC: 4.9 MMOL/L (ref 3.5–5.3)
PROT SERPL-MCNC: 8.3 G/DL (ref 6.4–8.2)
RBC # BLD AUTO: 6.27 MILLION/UL (ref 3.88–5.62)
SODIUM SERPL-SCNC: 137 MMOL/L (ref 136–145)
TROPONIN I SERPL-MCNC: <0.02 NG/ML
TSH SERPL DL<=0.05 MIU/L-ACNC: 2.62 UIU/ML (ref 0.36–3.74)
WBC # BLD AUTO: 9.68 THOUSAND/UL (ref 4.31–10.16)

## 2021-02-28 PROCEDURE — 36415 COLL VENOUS BLD VENIPUNCTURE: CPT | Performed by: EMERGENCY MEDICINE

## 2021-02-28 PROCEDURE — 84484 ASSAY OF TROPONIN QUANT: CPT | Performed by: EMERGENCY MEDICINE

## 2021-02-28 PROCEDURE — 84443 ASSAY THYROID STIM HORMONE: CPT | Performed by: EMERGENCY MEDICINE

## 2021-02-28 PROCEDURE — 99285 EMERGENCY DEPT VISIT HI MDM: CPT | Performed by: EMERGENCY MEDICINE

## 2021-02-28 PROCEDURE — 85025 COMPLETE CBC W/AUTO DIFF WBC: CPT | Performed by: EMERGENCY MEDICINE

## 2021-02-28 PROCEDURE — 71045 X-RAY EXAM CHEST 1 VIEW: CPT

## 2021-02-28 PROCEDURE — 93005 ELECTROCARDIOGRAM TRACING: CPT

## 2021-02-28 PROCEDURE — 99285 EMERGENCY DEPT VISIT HI MDM: CPT

## 2021-02-28 PROCEDURE — 96360 HYDRATION IV INFUSION INIT: CPT

## 2021-02-28 PROCEDURE — 80053 COMPREHEN METABOLIC PANEL: CPT | Performed by: EMERGENCY MEDICINE

## 2021-02-28 RX ADMIN — SODIUM CHLORIDE 1000 ML: 0.9 INJECTION, SOLUTION INTRAVENOUS at 03:22

## 2021-02-28 NOTE — ED PROVIDER NOTES
History  Chief Complaint   Patient presents with    Rapid Heart Rate     pt stating he woke up from a few minutes of sleep with rapid heart rate  Patient stating "I actually felt my heart popping out " Pt stating has anxiety normally, but this felt a little different  HPI    This is a 37 yo M who presents today with rapid heart rate  Patient hx of anxiety  States he woke up and went to bathroom and felt his heart racing/palpitations  No fevers/chest pain/sob  No other compliatns  States worried this might be something else than his anxiety  No other complaints  No drug use  No caffefine use  Impression: 37 yo M with tachycardia  Prior to Admission Medications   Prescriptions Last Dose Informant Patient Reported? Taking?   citalopram (CeleXA) 10 mg tablet   No No   Sig: Take 1 tablet (10 mg total) by mouth daily   ibuprofen (MOTRIN) 600 mg tablet   No No   Sig: Take 1 tablet (600 mg total) by mouth every 6 (six) hours as needed for mild pain   Patient not taking: Reported on 4/1/2020   omeprazole (PriLOSEC) 20 mg delayed release capsule   Yes No   Sig: Take 20 mg by mouth daily      Facility-Administered Medications: None       Past Medical History:   Diagnosis Date    Anxiety and depression     Psychiatric disorder        Past Surgical History:   Procedure Laterality Date    NO PAST SURGERIES         Family History   Problem Relation Age of Onset    Anxiety disorder Father     Hypertension Father      I have reviewed and agree with the history as documented  E-Cigarette/Vaping    E-Cigarette Use Never User      E-Cigarette/Vaping Substances     Social History     Tobacco Use    Smoking status: Former Smoker    Smokeless tobacco: Current User     Types: Chew   Substance Use Topics    Alcohol use: No    Drug use: No       Review of Systems   Constitutional: Negative  Negative for diaphoresis and fever  HENT: Negative  Respiratory: Negative    Negative for cough, shortness of breath and wheezing  Cardiovascular: Positive for palpitations  Negative for chest pain and leg swelling  Gastrointestinal: Negative for abdominal distention, abdominal pain, nausea and vomiting  Genitourinary: Negative  Musculoskeletal: Negative  Skin: Negative  Neurological: Negative  Psychiatric/Behavioral: Negative  All other systems reviewed and are negative  Physical Exam  Physical Exam  Vitals signs reviewed  Constitutional:       General: He is not in acute distress  Appearance: He is well-developed  He is not diaphoretic  HENT:      Head: Normocephalic and atraumatic  Nose: Nose normal    Eyes:      Conjunctiva/sclera: Conjunctivae normal       Pupils: Pupils are equal, round, and reactive to light  Neck:      Musculoskeletal: Normal range of motion and neck supple  Cardiovascular:      Rate and Rhythm: Regular rhythm  Tachycardia present  Heart sounds: Normal heart sounds  No murmur  Pulmonary:      Effort: Pulmonary effort is normal  No respiratory distress  Breath sounds: Normal breath sounds  No wheezing or rales  Abdominal:      General: Bowel sounds are normal  There is no distension  Palpations: Abdomen is soft  Tenderness: There is no abdominal tenderness  There is no guarding or rebound  Musculoskeletal: Normal range of motion  General: No tenderness or deformity  Skin:     General: Skin is warm and dry  Coloration: Skin is not pale  Findings: No rash  Neurological:      Mental Status: He is alert and oriented to person, place, and time  Cranial Nerves: No cranial nerve deficit           Vital Signs  ED Triage Vitals   Temperature Pulse Respirations Blood Pressure SpO2   02/28/21 0246 02/28/21 0245 02/28/21 0246 02/28/21 0245 02/28/21 0245   (!) 96 5 °F (35 8 °C) (!) 122 20 (!) 199/116 97 %      Temp Source Heart Rate Source Patient Position - Orthostatic VS BP Location FiO2 (%)   02/28/21 0246 02/28/21 0246 02/28/21 0246 02/28/21 0246 --   Oral Monitor Sitting Right arm       Pain Score       --                  Vitals:    02/28/21 0315 02/28/21 0415 02/28/21 0430 02/28/21 0445   BP: 158/93 141/64 164/72 144/63   Pulse: (!) 116 100 97 96   Patient Position - Orthostatic VS:  Lying Sitting Sitting         Visual Acuity      ED Medications  Medications   sodium chloride 0 9 % bolus 1,000 mL (0 mL Intravenous Stopped 2/28/21 0410)       Diagnostic Studies  Results Reviewed     Procedure Component Value Units Date/Time    Comprehensive metabolic panel [[id number]]  (Abnormal) Collected: 02/28/21 0315    Lab Status: Final result Specimen: Blood from Arm, Left Updated: 02/28/21 0357     Sodium 137 mmol/L      Potassium 4 9 mmol/L      Chloride 102 mmol/L      CO2 26 mmol/L      ANION GAP 9 mmol/L      BUN 12 mg/dL      Creatinine 1 02 mg/dL      Glucose 143 mg/dL      Calcium 9 0 mg/dL      AST 53 U/L      ALT 75 U/L      Alkaline Phosphatase 84 U/L      Total Protein 8 3 g/dL      Albumin 4 1 g/dL      Total Bilirubin 0 40 mg/dL      eGFR 94 ml/min/1 73sq m     Narrative:      [place] guidelines for Chronic Kidney Disease (CKD):     Stage 1 with normal or high GFR (GFR > 90 mL/min/1 73 square meters)    Stage 2 Mild CKD (GFR = 60-89 mL/min/1 73 square meters)    Stage 3A Moderate CKD (GFR = 45-59 mL/min/1 73 square meters)    Stage 3B Moderate CKD (GFR = 30-44 mL/min/1 73 square meters)    Stage 4 Severe CKD (GFR = 15-29 mL/min/1 73 square meters)    Stage 5 End Stage CKD (GFR <15 mL/min/1 73 square meters)  Note: GFR calculation is accurate only with a steady state creatinine    TSH, 3rd generation with Free T4 reflex [[id number]]  (Normal) Collected: 02/28/21 0315    Lab Status: Final result Specimen: Blood from Arm, Left Updated: 02/28/21 0357     TSH 3RD GENERATON 2 620 uIU/mL     Narrative:      Patients undergoing fluorescein dye angiography may retain small amounts of fluorescein in the body for 48-72 hours post procedure  Samples containing fluorescein can produce falsely depressed TSH values  If the patient had this procedure,a specimen should be resubmitted post fluorescein clearance  Troponin I [[id number]]  (Normal) Collected: 02/28/21 0315    Lab Status: Final result Specimen: Blood from Arm, Left Updated: 02/28/21 0352     Troponin I <0 02 ng/mL     CBC and differential [[id number]]  (Abnormal) Collected: 02/28/21 0315    Lab Status: Final result Specimen: Blood from Arm, Left Updated: 02/28/21 0331     WBC 9 68 Thousand/uL      RBC 6 27 Million/uL      Hemoglobin 17 3 g/dL      Hematocrit 55 2 %      MCV 88 fL      MCH 27 6 pg      MCHC 31 3 g/dL      RDW 12 6 %      MPV 10 3 fL      Platelets 604 Thousands/uL      nRBC 0 /100 WBCs      Neutrophils Relative 55 %      Immat GRANS % 0 %      Lymphocytes Relative 38 %      Monocytes Relative 5 %      Eosinophils Relative 2 %      Basophils Relative 0 %      Neutrophils Absolute 5 23 Thousands/µL      Immature Grans Absolute 0 02 Thousand/uL      Lymphocytes Absolute 3 72 Thousands/µL      Monocytes Absolute 0 47 Thousand/µL      Eosinophils Absolute 0 20 Thousand/µL      Basophils Absolute 0 04 Thousands/µL                  XR chest 1 view portable    (Results Pending)              Procedures  Procedures         ED Course  ED Course as of Feb 28 0623   Sun Feb 28, 2021 0310 Procedure Note: EKG  Date/Time: 02/28/21 3:10 AM   Performed by: Tatyana Sanders   Authorized by: Tatyana Sandesr   Indications / Diagnosis:palpitatoins  ECG reviewed by me, the ED Provider: yes   The EKG demonstrates:  Rhythm: normal sinus  Intervals: normal intervals  Axis: normal axis  QRS/Blocks: normal QRS  ST Changes: No acute ST Changes, no STD/HARDIK  Sinus tach rate 114      0437 MDM: 37 yo M who presents today with anxiety  Workup is negative  Blood work is normal  Will discharge                                                 MDM    Disposition  Final diagnoses: Palpitations   Anxiety     Time reflects when diagnosis was documented in both MDM as applicable and the Disposition within this note     Time User Action Codes Description Comment    2/28/2021  4:38 AM Megan Dia Add [R00 2] Palpitations     2/28/2021  4:38 AM Megan Dia Add [F41 9] Anxiety       ED Disposition     ED Disposition Condition Date/Time Comment    Discharge Stable Sun Feb 28, 2021  4:38 AM Nathaniel Waters discharge to home/self care  Follow-up Information     Follow up With Specialties Details Why Contact Info Additional Information    [facility]  Schedule an appointment as soon as possible for a visit   Seun Carlos [address] [facility] Emergency Department Emergency Medicine  If symptoms worsen [address] [address] Emergency Department, [place], [place], [address]          Discharge Medication List as of 2/28/2021  4:39 AM      CONTINUE these medications which have NOT CHANGED    Details   citalopram (CeleXA) 10 mg tablet Take 1 tablet (10 mg total) by mouth daily, Starting Fri 1/22/2021, Normal      ibuprofen (MOTRIN) 600 mg tablet Take 1 tablet (600 mg total) by mouth every 6 (six) hours as needed for mild pain, Starting Sat 11/24/2018, Print      omeprazole (PriLOSEC) 20 mg delayed release capsule Take 20 mg by mouth daily, Historical Med           No discharge procedures on file      PDMP Review     None          ED Provider  Electronically Signed by           Ligia Sarmiento MD  02/28/21 0112

## 2021-02-28 NOTE — ED NOTES
Patient denies pain and is resting comfortably  Aaox4,continues cardiac monitor       Keyonna Durbin RN  02/28/21 5908

## 2021-03-03 LAB
ATRIAL RATE: 114 BPM
P AXIS: 60 DEGREES
PR INTERVAL: 158 MS
QRS AXIS: 72 DEGREES
QRSD INTERVAL: 94 MS
QT INTERVAL: 344 MS
QTC INTERVAL: 474 MS
T WAVE AXIS: 65 DEGREES
VENTRICULAR RATE: 114 BPM

## 2021-03-03 PROCEDURE — 93010 ELECTROCARDIOGRAM REPORT: CPT | Performed by: INTERNAL MEDICINE

## 2021-04-14 DIAGNOSIS — F41.9 ANXIETY: ICD-10-CM

## 2021-04-15 RX ORDER — CITALOPRAM 10 MG/1
10 TABLET ORAL DAILY
Qty: 30 TABLET | Refills: 2 | Status: SHIPPED | OUTPATIENT
Start: 2021-04-15 | End: 2022-01-12 | Stop reason: SDUPTHER

## 2021-05-29 ENCOUNTER — OFFICE VISIT (OUTPATIENT)
Dept: URGENT CARE | Facility: CLINIC | Age: 36
End: 2021-05-29
Payer: COMMERCIAL

## 2021-05-29 VITALS
DIASTOLIC BLOOD PRESSURE: 100 MMHG | RESPIRATION RATE: 18 BRPM | TEMPERATURE: 96 F | BODY MASS INDEX: 29.8 KG/M2 | OXYGEN SATURATION: 98 % | SYSTOLIC BLOOD PRESSURE: 150 MMHG | HEIGHT: 72 IN | WEIGHT: 220 LBS | HEART RATE: 108 BPM

## 2021-05-29 DIAGNOSIS — R10.10 UPPER ABDOMINAL PAIN: Primary | ICD-10-CM

## 2021-05-29 PROCEDURE — 3725F SCREEN DEPRESSION PERFORMED: CPT | Performed by: PHYSICIAN ASSISTANT

## 2021-05-29 PROCEDURE — 99213 OFFICE O/P EST LOW 20 MIN: CPT | Performed by: PHYSICIAN ASSISTANT

## 2021-05-29 NOTE — PATIENT INSTRUCTIONS
Upper abdominal pain  not an acute abdomen, likely due to gallbladder or GERD  Recommend follow-up with primary care office to get orders for abdominal ultrasound in or blood work  If pain worsens, fever, vomiting, proceed to the ER  Recommend low-fat diet     Follow up with PCP in 3-5 days  Proceed to  ER if symptoms worsen  Abdominal Pain   WHAT YOU NEED TO KNOW:   Abdominal pain can be dull, achy, or sharp  You may have pain in one area of your abdomen, or in your entire abdomen  Your pain may be caused by a condition such as constipation, food sensitivity or poisoning, infection, or a blockage  Abdominal pain can also be from a hernia, appendicitis, or an ulcer  Liver, gallbladder, or kidney conditions can also cause abdominal pain  The cause of your abdominal pain may be unknown  DISCHARGE INSTRUCTIONS:   Return to the emergency department if:   · You have new chest pain or shortness of breath  · You have pulsing pain in your upper abdomen or lower back that suddenly becomes constant  · Your pain is in the right lower abdominal area and worsens with movement  · You have a fever over 100 4°F (38°C) or shaking chills  · You are vomiting and cannot keep food or liquids down  · Your pain does not improve or gets worse over the next 8 to 12 hours  · You see blood in your vomit or bowel movements, or they look black and tarry  · Your skin or the whites of your eyes turn yellow  · You are a woman and have a large amount of vaginal bleeding that is not your monthly period  Contact your healthcare provider if:   · You have pain in your lower back  · You are a man and have pain in your testicles  · You have pain when you urinate  · You have questions or concerns about your condition or care  Follow up with your healthcare provider within 24 hours or as directed:  Write down your questions so you remember to ask them during your visits     Medicines:   · Medicines may be given to calm your stomach and prevent vomiting or to decrease pain  Ask how to take pain medicine safely  · Take your medicine as directed  Contact your healthcare provider if you think your medicine is not helping or if you have side effects  Tell him of her if you are allergic to any medicine  Keep a list of the medicines, vitamins, and herbs you take  Include the amounts, and when and why you take them  Bring the list or the pill bottles to follow-up visits  Carry your medicine list with you in case of an emergency  © Copyright [address] Information is for End User's use only and may not be sold, redistributed or otherwise used for commercial purposes  All illustrations and images included in CareNotes® are the copyrighted property of A D A M , Inc  or [facility] Georgina Richard   The above information is an  only  It is not intended as medical advice for individual conditions or treatments  Talk to your doctor, nurse or pharmacist before following any medical regimen to see if it is safe and effective for you

## 2021-05-29 NOTE — LETTER
May 29, 2021     Patient: Juan Antonio Jacobsen   YOB: 1985   Date of Visit: 5/29/2021       To Whom it May Concern:    Juan Antonio Jacobsen was seen in my clinic on 5/29/2021  Please excuse from work on 5/29/2021  If you have any questions or concerns, please don't hesitate to call           Sincerely,          Yasmani Hilton PA-C        CC: No Recipients

## 2021-05-29 NOTE — PROGRESS NOTES
[address] [facility] [employer] [facility]      NAME: Dandy Boss is a 38 y o  male  : 1985    MRN: [id number]  DATE: May 29, 2021  TIME: 8:57 AM    Assessment and Plan   Upper abdominal pain [R10 10]  1  Upper abdominal pain         Patient Instructions   Upper abdominal pain  not an acute abdomen, likely due to gallbladder or GERD  Recommend follow-up with primary care office to get orders for abdominal ultrasound in or blood work  If pain worsens, fever, vomiting, proceed to the ER  Recommend low-fat diet     Follow up with PCP in 3-5 days  Proceed to  ER if symptoms worsen  Chief Complaint     Chief Complaint   Patient presents with    Abdominal Pain     Patient complains of abdominal pain starting over a week, the pain comes and goes  Pain comes worse after eating pain with bloating  Burping helps slightly but unable to pass any other gas  Passing stools normally  History of Present Illness         Leonora Adjutant is a 50-year-old male who perm to the clinic complaining of epigastric abdominal pain x1 week  He states that 2-3 months ago he had similar pain which she noticed improved with avoiding dairy or high fat meals  Then the pain resolved however 1 week ago he noticed pain returned and worsening of the pain with high fat the meals  States the pain is intermittent and correlates with meals  He also notes today increased pain with movement  He denies any fever, chills, nausea, vomiting, dysuria, chest pain, or shortness of breath  Review of Systems   Review of Systems   Constitutional: Negative for chills, diaphoresis, fatigue and fever  Gastrointestinal: Positive for abdominal pain  Negative for constipation, diarrhea, nausea and vomiting  Genitourinary: Negative for dysuria  Musculoskeletal: Negative for back pain       Current Medications       Current Outpatient Medications:     citalopram (CeleXA) 10 mg tablet, Take 1 tablet (10 mg total) by mouth daily, Disp: 30 tablet, Rfl: 2    omeprazole (PriLOSEC) 20 mg delayed release capsule, Take 20 mg by mouth daily, Disp: , Rfl:     ibuprofen (MOTRIN) 600 mg tablet, Take 1 tablet (600 mg total) by mouth every 6 (six) hours as needed for mild pain (Patient not taking: Reported on 4/1/2020), Disp: 20 tablet, Rfl: 0    Current Allergies     Allergies as of 05/29/2021    (No Known Allergies)            The following portions of the patient's history were reviewed and updated as appropriate: allergies, current medications, past family history, past medical history, past social history, past surgical history and problem list      Past Medical History:   Diagnosis Date    Anxiety and depression     Psychiatric disorder        Past Surgical History:   Procedure Laterality Date    NO PAST SURGERIES         Family History   Problem Relation Age of Onset    Anxiety disorder Father     Hypertension Father          Medications have been verified  Objective   /100   Pulse (!) 108   Temp (!) 96 °F (35 6 °C) (Tympanic)   Resp 18   Ht 6' (1 829 m)   Wt 99 8 kg (220 lb)   SpO2 98%   BMI 29 84 kg/m²   No LMP for male patient  Physical Exam     Physical Exam  Vitals signs and nursing note reviewed  Constitutional:       General: He is not in acute distress  Appearance: He is well-developed  He is not ill-appearing  Cardiovascular:      Rate and Rhythm: Normal rate and regular rhythm  Heart sounds: Normal heart sounds  Pulmonary:      Effort: Pulmonary effort is normal       Breath sounds: Normal breath sounds  Abdominal:      Palpations: Abdomen is soft  Tenderness: There is abdominal tenderness in the right upper quadrant  There is no right CVA tenderness, left CVA tenderness, guarding or rebound  Negative signs include Ladd's sign, Rovsing's sign and McBurney's sign  Hernia: No hernia is present  Neurological:      Mental Status: He is alert and oriented to person, place, and time     Psychiatric:         Mood and Affect: Mood normal          Behavior: Behavior normal

## 2021-05-30 ENCOUNTER — TELEPHONE (OUTPATIENT)
Dept: OTHER | Facility: OTHER | Age: 36
End: 2021-05-30

## 2021-05-30 NOTE — TELEPHONE ENCOUNTER
Patient was seen in the  abdominal pain and it was recommended that he call his PCP and have an ultrasound of gall bladder ordered

## 2021-06-01 ENCOUNTER — OFFICE VISIT (OUTPATIENT)
Dept: FAMILY MEDICINE CLINIC | Facility: CLINIC | Age: 36
End: 2021-06-01
Payer: COMMERCIAL

## 2021-06-01 VITALS
SYSTOLIC BLOOD PRESSURE: 130 MMHG | DIASTOLIC BLOOD PRESSURE: 92 MMHG | WEIGHT: 216.7 LBS | OXYGEN SATURATION: 98 % | HEART RATE: 115 BPM | BODY MASS INDEX: 29.39 KG/M2 | TEMPERATURE: 96.6 F

## 2021-06-01 DIAGNOSIS — K21.9 GASTROESOPHAGEAL REFLUX DISEASE, UNSPECIFIED WHETHER ESOPHAGITIS PRESENT: ICD-10-CM

## 2021-06-01 DIAGNOSIS — R10.11 RUQ PAIN: Primary | ICD-10-CM

## 2021-06-01 PROCEDURE — 99213 OFFICE O/P EST LOW 20 MIN: CPT | Performed by: FAMILY MEDICINE

## 2021-06-01 RX ORDER — FAMOTIDINE 20 MG/1
20 TABLET, FILM COATED ORAL 2 TIMES DAILY
Qty: 60 TABLET | Refills: 0 | Status: SHIPPED | OUTPATIENT
Start: 2021-06-01 | End: 2021-10-01

## 2021-06-01 RX ORDER — DICYCLOMINE HYDROCHLORIDE 10 MG/1
10 CAPSULE ORAL
Qty: 60 CAPSULE | Refills: 0 | Status: CANCELLED | OUTPATIENT
Start: 2021-06-01

## 2021-06-01 NOTE — LETTER
June 1, 2021     Patient: Shiloh Garcia   YOB: 1985   Date of Visit: 6/1/2021       To Whom it May Concern:    Shiloh Garcia is under my professional care  He was seen in my office on 6/1/2021  He may return to work on Wednesday June 2nd  If you have any questions or concerns, please don't hesitate to call           Sincerely,          Federico Heller,         CC: No Recipients

## 2021-06-01 NOTE — LETTER
June 1, 2021     Patient: Candy Stapleton   YOB: 1985   Date of Visit: 6/1/2021       To Whom it May Concern:    Candy Stapleton is under my professional care  He was seen in my office on 6/1/2021  He may return to work with limitations : please exempt him from lifting anything heavy or from doign work that involves him bending forward  If you have any questions or concerns, please don't hesitate to call           Sincerely,          Federico Brooks,         CC: No Recipients

## 2021-06-01 NOTE — PROGRESS NOTES
[address] Visit  Talya Foreman  38 y o  male  [id number]     Subjective:      Patient ID: Talya Foreman is a 38 y o  male  HPI   patient is a 62-year-old male presenting for abdominal pain  Patient reports onset of sharp abdominal pain that woke him up from sleep 2-3 months ago (epigastric in location)  Had pork for dinner  1 week and a half ago the abdominal pain came back on the RUQ after the dinner  Patient admits eating fatty meals before the onset of each abdomina pain episodes  Patient reports family history of gall bladder surgery in multiple members of his family  Patient denies any fever, nausea, vomiting, or diarrhea  Patient also denies any constipation  Patient reports frequent heartburn despite the use of Prilosec  Review of Systems    See above    Objective:    /92   Pulse (!) 115   Temp (!) 96 6 °F (35 9 °C) (Tympanic)   Wt 98 3 kg (216 lb 11 2 oz)   SpO2 98%   BMI 29 39 kg/m²        Physical Exam  Vitals signs and nursing note reviewed  Constitutional:       General: He is not in acute distress  Appearance: He is not ill-appearing or toxic-appearing  HENT:      Head: Normocephalic and atraumatic  Abdominal:      General: Bowel sounds are normal       Tenderness: There is abdominal tenderness in the right upper quadrant and epigastric area  Positive signs include Ladd's sign (mildly tender; equivocal)  Neurological:      Mental Status: He is alert  Assessment/Plan:    No problem-specific Assessment & Plan notes found for this encounter  Diagnoses and all orders for this visit:    RUQ pain  - no signs of acute abdomen  -     US right upper quadrant; Future  - r/o cholelithiasis, etc      Gastroesophageal reflux disease, unspecified whether esophagitis present  -     famotidine (PEPCID) 20 mg tablet; Take 1 tablet (20 mg total) by mouth 2 (two) times a day    Other orders  -     Cancel: dicyclomine (BENTYL) 10 mg capsule;  Take 1 capsule (10 mg total) by mouth 4 (four) times a day (before meals and at bedtime)         RTC  PRN   The patient was counseled regarding diagnostic results, instructions for management, risk factor reductions, prognosis, patient and family education, impressions, risks and benefits of treatment options  The treatment plan was reviewed with the patient/guardian  The patient/guardian understands and agrees with the treatment plan  --  Donna Olmedo, DO    "This note has been constructed using a voice recognition system  Therefore there may be syntax, spelling, and/or grammatical errors   Please call if you have any questions "

## 2021-06-01 NOTE — TELEPHONE ENCOUNTER
Please advise if you's like to order ultrasound or have patient make an appointment to be seen first   thanks

## 2021-06-03 ENCOUNTER — TELEPHONE (OUTPATIENT)
Dept: FAMILY MEDICINE CLINIC | Facility: CLINIC | Age: 36
End: 2021-06-03

## 2021-06-03 ENCOUNTER — HOSPITAL ENCOUNTER (OUTPATIENT)
Dept: RADIOLOGY | Facility: HOSPITAL | Age: 36
Discharge: HOME | End: 2021-06-03
Payer: COMMERCIAL

## 2021-06-03 DIAGNOSIS — Q44.1: ICD-10-CM

## 2021-06-03 DIAGNOSIS — R10.11 RUQ PAIN: ICD-10-CM

## 2021-06-03 DIAGNOSIS — K80.20 CALCULUS OF GALLBLADDER WITHOUT CHOLECYSTITIS WITHOUT OBSTRUCTION: Primary | ICD-10-CM

## 2021-06-03 DIAGNOSIS — K21.9 GASTROESOPHAGEAL REFLUX DISEASE, UNSPECIFIED WHETHER ESOPHAGITIS PRESENT: ICD-10-CM

## 2021-06-03 PROCEDURE — 76705 ECHO EXAM OF ABDOMEN: CPT

## 2021-06-03 NOTE — TELEPHONE ENCOUNTER
Called received from Radiology to report significant findings on pts US of RUQ  Report in chart  Please review  Thank you

## 2021-06-09 ENCOUNTER — CONSULT (OUTPATIENT)
Dept: SURGERY | Facility: CLINIC | Age: 36
End: 2021-06-09
Payer: COMMERCIAL

## 2021-06-09 VITALS
HEIGHT: 72 IN | WEIGHT: 219 LBS | HEART RATE: 103 BPM | BODY MASS INDEX: 29.66 KG/M2 | TEMPERATURE: 98.9 F | DIASTOLIC BLOOD PRESSURE: 80 MMHG | SYSTOLIC BLOOD PRESSURE: 134 MMHG

## 2021-06-09 DIAGNOSIS — Q44.1: ICD-10-CM

## 2021-06-09 DIAGNOSIS — K80.20 CALCULUS OF GALLBLADDER WITHOUT CHOLECYSTITIS WITHOUT OBSTRUCTION: ICD-10-CM

## 2021-06-09 DIAGNOSIS — R10.11 RUQ PAIN: ICD-10-CM

## 2021-06-09 PROCEDURE — 1036F TOBACCO NON-USER: CPT | Performed by: SURGERY

## 2021-06-09 PROCEDURE — 99244 OFF/OP CNSLTJ NEW/EST MOD 40: CPT | Performed by: SURGERY

## 2021-06-09 PROCEDURE — 3008F BODY MASS INDEX DOCD: CPT | Performed by: SURGERY

## 2021-06-09 RX ORDER — CEFAZOLIN SODIUM 2 G/50ML
2000 SOLUTION INTRAVENOUS ONCE
Status: CANCELLED | OUTPATIENT
Start: 2021-06-25 | End: 2021-06-09

## 2021-06-09 NOTE — H&P
H&P Exam - General Surgery   Yanni Aparicio 38 y o  male MRN: [id number]  Unit/Bed#:  Encounter: [id number]    Assessment/Plan     Assessment:   50-year-old male with symptomatic cholelithiasis     I reviewed the most recent note from the PCP on June 1, 2021 and the ultrasound results from 06/03/2021      Plan:   discussed with patient proceeding with laparoscopic possible open cholecystectomy  Patient is agreeable to proceed with surgery he understands the risks benefits and alternatives, including but not limited to need for an open procedure, bleeding, infection, and damage to surrounding structures requiring additional procedures  Patient understands to abide by low-fat diet until surgery  COVID test  Preoperative antibiotics    History of Present Illness     HPI:  Yanni Aparicio is a 38 y o  male who presents with right upper quadrant pain referred by his PCP  Reports right upper quadrant pain for the past several months intermittantly  Significant family history of gallbladder removal, gerd, barretts esophagush and sister with GIST     PCP note from 6/1/21  " patient is a 50-year-old male presenting for abdominal pain  Patient reports onset of sharp abdominal pain that woke him up from sleep 2-3 months ago (epigastric in location)  Had pork for dinner  1 week and a half ago the abdominal pain came back on the RUQ after the dinner  Patient admits eating fatty meals before the onset of each abdomina pain episodes  Patient reports family history of gall bladder surgery in multiple members of his family  Patient denies any fever, nausea, vomiting, or diarrhea  Patient also denies any constipation  Patient reports frequent heartburn despite the use of Prilosec "    6/3/21 RUQ US: BILIARY:  The gallbladder is normal in caliber  Suspected phrygian cap at gallbladder fundus   No pericholecystic fluid  Shadowing gallstone(s) identified  No sonographic Ladd's sign  No intrahepatic biliary dilatation    CBD measures 3 mm  No choledocholithiasis  REVIEW OF SYSTEMS  Constitutional:  Denies fever or chills   Eyes:  Denies change in visual acuity   HENT:  Denies nasal congestion or sore throat   Respiratory:  Denies cough or shortness of breath   Cardiovascular:  Denies chest pain or edema history of hypertension  GI:  + abdomial pain ruq , nausea, vomiting, bloody stools or diarrhea   :  Denies dysuria, frequency, difficulty in micturition and nocturia  Musculoskeletal:  Denies back pain   Neurologic:  Denies headache, focal weakness or sensory changes   Endocrine:  History of diabetes mellitus  Lymphatic:  Denies swollen glands   Psychiatric:  Denies depression or anxiety     Historical Information   Past Medical History:   Diagnosis Date    Anxiety and depression     Psychiatric disorder      Past Surgical History:   Procedure Laterality Date    NO PAST SURGERIES       Social History   Social History     Substance and Sexual Activity   Alcohol Use No     Social History     Substance and Sexual Activity   Drug Use No     Social History     Tobacco Use   Smoking Status Former Smoker   Smokeless Tobacco Current User    Types: Chew     E-Cigarette/Vaping    E-Cigarette Use Never User      E-Cigarette/Vaping Substances     Family History: non-contributory    Meds/Allergies   all medications and allergies reviewed  No Known Allergies    Objective   First Vitals:   @VSFIRST2(5,8,6,7,9,11,14,10:FIRST)@    Current Vitals:        [unfilled]    Invasive Devices     None                 Physical Exam:  Vital Signs: There were no vitals taken for this visit    Gen: No acute distress    Eyes: Extraocular motion intact, conjunctiva normal    Neck: Trachea midline, no thyromegaly, No JVD   Pulm: No increased work of breathing    Card:Regular rate    Abd: Soft, non-distended, non-tender, no guarding, no rebound    Ext: No cyanosis or edema bilateral    Neuro: Patient oriented to time and place   Psych: Appropriate affect, no obvious psychosis   Skin: No rashes   Rectal: deferred      Lab Results: I have personally reviewed pertinent lab results  , CBC: No results found for: WBC, HGB, HCT, MCV, PLT, ADJUSTEDWBC, MCH, MCHC, RDW, MPV, NRBC, CMP: No results found for: SODIUM, K, CL, CO2, ANIONGAP, BUN, CREATININE, GLUCOSE, CALCIUM, AST, ALT, ALKPHOS, PROT, BILITOT, EGFR  Imaging: I have personally reviewed pertinent reports  EKG, Pathology, and Other Studies: I have personally reviewed pertinent reports  Code Status: [unfilled]  Advance Directive and Living Will:      Power of :    POLST:      Counseling / Coordination of Care  Total floor / unit time spent today 30 minutes  Greater than 50% of total time was spent with the patient and / or family counseling and / or coordination of care  A description of the counseling / coordination of care: 30

## 2021-06-09 NOTE — PROGRESS NOTES
H&P Exam - General Surgery   Lakesha Salgado 38 y o  male MRN: [id number]  Unit/Bed#:  Encounter: [id number]    Assessment/Plan     Assessment:   67-year-old male with symptomatic cholelithiasis     I reviewed the most recent note from the PCP on June 1, 2021 and the ultrasound results from 06/03/2021    Plan:   discussed with patient proceeding with laparoscopic possible open cholecystectomy  Patient is agreeable to proceed with surgery he understands the risks benefits and alternatives, including but not limited to need for an open procedure, bleeding, infection, and damage to surrounding structures requiring additional procedures  Patient understands to abide by low-fat diet until surgery  COVID test  Preoperative antibiotics    History of Present Illness     HPI:  Lakesha Salgado is a 38 y o  male who presents with right upper quadrant pain referred by his PCP  Reports right upper quadrant pain for the past several months intermittantly  Significant family history of gallbladder removal, gerd, barretts esophagush and sister with GIST     PCP note from 6/1/21  " patient is a 67-year-old male presenting for abdominal pain  Patient reports onset of sharp abdominal pain that woke him up from sleep 2-3 months ago (epigastric in location)  Had pork for dinner  1 week and a half ago the abdominal pain came back on the RUQ after the dinner  Patient admits eating fatty meals before the onset of each abdomina pain episodes  Patient reports family history of gall bladder surgery in multiple members of his family  Patient denies any fever, nausea, vomiting, or diarrhea  Patient also denies any constipation  Patient reports frequent heartburn despite the use of Prilosec "    6/3/21 RUQ US: BILIARY:  The gallbladder is normal in caliber  Suspected phrygian cap at gallbladder fundus   No pericholecystic fluid  Shadowing gallstone(s) identified  No sonographic Ladd's sign  No intrahepatic biliary dilatation    CBD measures 3 mm  No choledocholithiasis  REVIEW OF SYSTEMS  Constitutional:  Denies fever or chills   Eyes:  Denies change in visual acuity   HENT:  Denies nasal congestion or sore throat   Respiratory:  Denies cough or shortness of breath   Cardiovascular:  Denies chest pain or edema history of hypertension  GI:  + abdomial pain ruq , nausea, vomiting, bloody stools or diarrhea   :  Denies dysuria, frequency, difficulty in micturition and nocturia  Musculoskeletal:  Denies back pain   Neurologic:  Denies headache, focal weakness or sensory changes   Endocrine:  History of diabetes mellitus  Lymphatic:  Denies swollen glands   Psychiatric:  Denies depression or anxiety     Historical Information   Past Medical History:   Diagnosis Date    Anxiety and depression     Psychiatric disorder      Past Surgical History:   Procedure Laterality Date    NO PAST SURGERIES       Social History   Social History     Substance and Sexual Activity   Alcohol Use No     Social History     Substance and Sexual Activity   Drug Use No     Social History     Tobacco Use   Smoking Status Former Smoker   Smokeless Tobacco Current User    Types: Chew     E-Cigarette/Vaping    E-Cigarette Use Never User      E-Cigarette/Vaping Substances     Family History: non-contributory    Meds/Allergies   all medications and allergies reviewed  No Known Allergies    Objective   First Vitals:   @VSFIRST2(5,8,6,7,9,11,14,10:FIRST)@    Current Vitals:        [unfilled]    Invasive Devices     None                 Physical Exam:  Vital Signs: There were no vitals taken for this visit    Gen: No acute distress    Eyes: Extraocular motion intact, conjunctiva normal    Neck: Trachea midline, no thyromegaly, No JVD   Pulm: No increased work of breathing    Card:Regular rate    Abd: Soft, non-distended, non-tender, no guarding, no rebound    Ext: No cyanosis or edema bilateral    Neuro: Patient oriented to time and place   Psych: Appropriate affect, no obvious psychosis   Skin: No rashes   Rectal: deferred      Lab Results: I have personally reviewed pertinent lab results  , CBC: No results found for: WBC, HGB, HCT, MCV, PLT, ADJUSTEDWBC, MCH, MCHC, RDW, MPV, NRBC, CMP: No results found for: SODIUM, K, CL, CO2, ANIONGAP, BUN, CREATININE, GLUCOSE, CALCIUM, AST, ALT, ALKPHOS, PROT, BILITOT, EGFR  Imaging: I have personally reviewed pertinent reports  EKG, Pathology, and Other Studies: I have personally reviewed pertinent reports  Code Status: [unfilled]  Advance Directive and Living Will:      Power of :    POLST:      Counseling / Coordination of Care  Total floor / unit time spent today 30 minutes  Greater than 50% of total time was spent with the patient and / or family counseling and / or coordination of care  A description of the counseling / coordination of care: 30

## 2021-06-19 PROCEDURE — U0003 INFECTIOUS AGENT DETECTION BY NUCLEIC ACID (DNA OR RNA); SEVERE ACUTE RESPIRATORY SYNDROME CORONAVIRUS 2 (SARS-COV-2) (CORONAVIRUS DISEASE [COVID-19]), AMPLIFIED PROBE TECHNIQUE, MAKING USE OF HIGH THROUGHPUT TECHNOLOGIES AS DESCRIBED BY CMS-2020-01-R: HCPCS | Performed by: SURGERY

## 2021-06-19 PROCEDURE — U0005 INFEC AGEN DETEC AMPLI PROBE: HCPCS | Performed by: SURGERY

## 2021-06-24 ENCOUNTER — ANESTHESIA EVENT (OUTPATIENT)
Dept: PERIOP | Facility: HOSPITAL | Age: 36
End: 2021-06-24
Payer: COMMERCIAL

## 2021-06-25 ENCOUNTER — ANESTHESIA (OUTPATIENT)
Dept: PERIOP | Facility: HOSPITAL | Age: 36
End: 2021-06-25
Payer: COMMERCIAL

## 2021-06-25 ENCOUNTER — HOSPITAL ENCOUNTER (OUTPATIENT)
Facility: HOSPITAL | Age: 36
Setting detail: OUTPATIENT SURGERY
Discharge: HOME | End: 2021-06-25
Attending: SURGERY | Admitting: SURGERY
Payer: COMMERCIAL

## 2021-06-25 VITALS
WEIGHT: 218 LBS | TEMPERATURE: 97.7 F | RESPIRATION RATE: 20 BRPM | SYSTOLIC BLOOD PRESSURE: 132 MMHG | BODY MASS INDEX: 29.57 KG/M2 | DIASTOLIC BLOOD PRESSURE: 76 MMHG | OXYGEN SATURATION: 96 % | HEART RATE: 87 BPM

## 2021-06-25 DIAGNOSIS — K80.20 CALCULUS OF GALLBLADDER WITHOUT CHOLECYSTITIS WITHOUT OBSTRUCTION: ICD-10-CM

## 2021-06-25 PROCEDURE — 47562 LAPAROSCOPIC CHOLECYSTECTOMY: CPT | Performed by: PHYSICIAN ASSISTANT

## 2021-06-25 PROCEDURE — NC001 PR NO CHARGE: Performed by: SURGERY

## 2021-06-25 PROCEDURE — 47562 LAPAROSCOPIC CHOLECYSTECTOMY: CPT | Performed by: SURGERY

## 2021-06-25 PROCEDURE — 88304 TISSUE EXAM BY PATHOLOGIST: CPT | Performed by: PATHOLOGY

## 2021-06-25 RX ORDER — CEFAZOLIN SODIUM 2 G/50ML
2000 SOLUTION INTRAVENOUS ONCE
Status: COMPLETED | OUTPATIENT
Start: 2021-06-25 | End: 2021-06-25

## 2021-06-25 RX ORDER — FENTANYL CITRATE/PF 50 MCG/ML
25 SYRINGE (ML) INJECTION
Status: DISCONTINUED | OUTPATIENT
Start: 2021-06-25 | End: 2021-06-25 | Stop reason: HOSPADM

## 2021-06-25 RX ORDER — GLYCOPYRROLATE 0.2 MG/ML
INJECTION INTRAMUSCULAR; INTRAVENOUS AS NEEDED
Status: DISCONTINUED | OUTPATIENT
Start: 2021-06-25 | End: 2021-06-25

## 2021-06-25 RX ORDER — MEPERIDINE HYDROCHLORIDE 25 MG/ML
12.5 INJECTION INTRAMUSCULAR; INTRAVENOUS; SUBCUTANEOUS
Status: DISCONTINUED | OUTPATIENT
Start: 2021-06-25 | End: 2021-06-25 | Stop reason: HOSPADM

## 2021-06-25 RX ORDER — BUPIVACAINE HYDROCHLORIDE AND EPINEPHRINE 5; 5 MG/ML; UG/ML
INJECTION, SOLUTION PERINEURAL AS NEEDED
Status: DISCONTINUED | OUTPATIENT
Start: 2021-06-25 | End: 2021-06-25 | Stop reason: HOSPADM

## 2021-06-25 RX ORDER — LIDOCAINE HYDROCHLORIDE 10 MG/ML
INJECTION, SOLUTION EPIDURAL; INFILTRATION; INTRACAUDAL; PERINEURAL AS NEEDED
Status: DISCONTINUED | OUTPATIENT
Start: 2021-06-25 | End: 2021-06-25

## 2021-06-25 RX ORDER — ONDANSETRON 2 MG/ML
4 INJECTION INTRAMUSCULAR; INTRAVENOUS ONCE AS NEEDED
Status: DISCONTINUED | OUTPATIENT
Start: 2021-06-25 | End: 2021-06-25 | Stop reason: HOSPADM

## 2021-06-25 RX ORDER — HYDROMORPHONE HCL/PF 1 MG/ML
0.5 SYRINGE (ML) INJECTION
Status: DISCONTINUED | OUTPATIENT
Start: 2021-06-25 | End: 2021-06-25 | Stop reason: HOSPADM

## 2021-06-25 RX ORDER — PROPOFOL 10 MG/ML
INJECTION, EMULSION INTRAVENOUS AS NEEDED
Status: DISCONTINUED | OUTPATIENT
Start: 2021-06-25 | End: 2021-06-25

## 2021-06-25 RX ORDER — ROCURONIUM BROMIDE 10 MG/ML
INJECTION, SOLUTION INTRAVENOUS AS NEEDED
Status: DISCONTINUED | OUTPATIENT
Start: 2021-06-25 | End: 2021-06-25

## 2021-06-25 RX ORDER — FENTANYL CITRATE 50 UG/ML
INJECTION, SOLUTION INTRAMUSCULAR; INTRAVENOUS AS NEEDED
Status: DISCONTINUED | OUTPATIENT
Start: 2021-06-25 | End: 2021-06-25

## 2021-06-25 RX ORDER — OXYCODONE HYDROCHLORIDE AND ACETAMINOPHEN 5; 325 MG/1; MG/1
1 TABLET ORAL EVERY 4 HOURS PRN
Qty: 10 TABLET | Refills: 0 | Status: SHIPPED | OUTPATIENT
Start: 2021-06-25 | End: 2021-07-05

## 2021-06-25 RX ORDER — MAGNESIUM HYDROXIDE 1200 MG/15ML
LIQUID ORAL AS NEEDED
Status: DISCONTINUED | OUTPATIENT
Start: 2021-06-25 | End: 2021-06-25 | Stop reason: HOSPADM

## 2021-06-25 RX ORDER — ONDANSETRON 2 MG/ML
INJECTION INTRAMUSCULAR; INTRAVENOUS AS NEEDED
Status: DISCONTINUED | OUTPATIENT
Start: 2021-06-25 | End: 2021-06-25

## 2021-06-25 RX ORDER — DEXAMETHASONE SODIUM PHOSPHATE 10 MG/ML
INJECTION, SOLUTION INTRAMUSCULAR; INTRAVENOUS AS NEEDED
Status: DISCONTINUED | OUTPATIENT
Start: 2021-06-25 | End: 2021-06-25

## 2021-06-25 RX ORDER — MIDAZOLAM HYDROCHLORIDE 2 MG/2ML
INJECTION, SOLUTION INTRAMUSCULAR; INTRAVENOUS AS NEEDED
Status: DISCONTINUED | OUTPATIENT
Start: 2021-06-25 | End: 2021-06-25

## 2021-06-25 RX ORDER — NEOSTIGMINE METHYLSULFATE 1 MG/ML
INJECTION INTRAVENOUS AS NEEDED
Status: DISCONTINUED | OUTPATIENT
Start: 2021-06-25 | End: 2021-06-25

## 2021-06-25 RX ORDER — SODIUM CHLORIDE, SODIUM LACTATE, POTASSIUM CHLORIDE, CALCIUM CHLORIDE 600; 310; 30; 20 MG/100ML; MG/100ML; MG/100ML; MG/100ML
125 INJECTION, SOLUTION INTRAVENOUS CONTINUOUS
Status: DISCONTINUED | OUTPATIENT
Start: 2021-06-25 | End: 2021-06-25 | Stop reason: HOSPADM

## 2021-06-25 RX ADMIN — CEFAZOLIN SODIUM 2000 MG: 2 SOLUTION INTRAVENOUS at 10:58

## 2021-06-25 RX ADMIN — FENTANYL CITRATE 50 MCG: 50 INJECTION, SOLUTION INTRAMUSCULAR; INTRAVENOUS at 11:40

## 2021-06-25 RX ADMIN — ROCURONIUM BROMIDE 50 MG: 10 INJECTION, SOLUTION INTRAVENOUS at 10:52

## 2021-06-25 RX ADMIN — SODIUM CHLORIDE, SODIUM LACTATE, POTASSIUM CHLORIDE, AND CALCIUM CHLORIDE 125 ML/HR: .6; .31; .03; .02 INJECTION, SOLUTION INTRAVENOUS at 09:53

## 2021-06-25 RX ADMIN — FENTANYL CITRATE 50 MCG: 50 INJECTION, SOLUTION INTRAMUSCULAR; INTRAVENOUS at 10:56

## 2021-06-25 RX ADMIN — LIDOCAINE HYDROCHLORIDE 50 MG: 10 INJECTION, SOLUTION EPIDURAL; INFILTRATION; INTRACAUDAL; PERINEURAL at 10:51

## 2021-06-25 RX ADMIN — MIDAZOLAM 2 MG: 1 INJECTION INTRAMUSCULAR; INTRAVENOUS at 10:48

## 2021-06-25 RX ADMIN — GLYCOPYRROLATE 0.8 MG: 0.2 INJECTION, SOLUTION INTRAMUSCULAR; INTRAVENOUS at 11:42

## 2021-06-25 RX ADMIN — MIDAZOLAM 2 MG: 1 INJECTION INTRAMUSCULAR; INTRAVENOUS at 10:44

## 2021-06-25 RX ADMIN — SODIUM CHLORIDE, SODIUM LACTATE, POTASSIUM CHLORIDE, AND CALCIUM CHLORIDE 125 ML/HR: .6; .31; .03; .02 INJECTION, SOLUTION INTRAVENOUS at 09:56

## 2021-06-25 RX ADMIN — FENTANYL CITRATE 50 MCG: 50 INJECTION, SOLUTION INTRAMUSCULAR; INTRAVENOUS at 10:51

## 2021-06-25 RX ADMIN — FENTANYL CITRATE 50 MCG: 50 INJECTION, SOLUTION INTRAMUSCULAR; INTRAVENOUS at 11:00

## 2021-06-25 RX ADMIN — SODIUM CHLORIDE, SODIUM LACTATE, POTASSIUM CHLORIDE, AND CALCIUM CHLORIDE: .6; .31; .03; .02 INJECTION, SOLUTION INTRAVENOUS at 11:21

## 2021-06-25 RX ADMIN — PROPOFOL 170 MG: 10 INJECTION, EMULSION INTRAVENOUS at 10:51

## 2021-06-25 RX ADMIN — FENTANYL CITRATE 25 MCG: 50 INJECTION, SOLUTION INTRAMUSCULAR; INTRAVENOUS at 12:31

## 2021-06-25 RX ADMIN — NEOSTIGMINE METHYLSULFATE 5 MG: 1 INJECTION INTRAVENOUS at 11:42

## 2021-06-25 RX ADMIN — DEXAMETHASONE SODIUM PHOSPHATE 4 MG: 10 INJECTION, SOLUTION INTRAMUSCULAR; INTRAVENOUS at 11:00

## 2021-06-25 RX ADMIN — ONDANSETRON 4 MG: 2 INJECTION INTRAMUSCULAR; INTRAVENOUS at 11:00

## 2021-06-25 NOTE — OP NOTE
OPERATIVE REPORT  PATIENT NAME: Mily Torrez    :  1985  MRN: [id number]  Pt Location: [facility] OR ROOM 01    SURGERY DATE: 2021    Surgeon(s) and Role:     * Anuj Waters MD - Primary     * Meño Enriquez PA-C - Assisting    Preop Diagnosis:  Calculus of gallbladder without cholecystitis without obstruction [K80 20]    Post-Op Diagnosis Codes:     * Calculus of gallbladder without cholecystitis without obstruction [K80 20]    Procedure(s) (LRB):  CHOLECYSTECTOMY LAPAROSCOPIC (N/A)    Specimen(s):  ID Type Source Tests Collected by Time Destination   1 : gallbladder Tissue Gallbladder TISSUE EXAM Anuj Waters MD 2021 [id number]        Estimated Blood Loss:   Minimal    Drains:  * No LDAs found *    Anesthesia Type:   General    Operative Indications:  Calculus of gallbladder without cholecystitis without obstruction [K80 20]      Operative Findings:  Slightly enlarged cystic duct: 2 clips (5mm) placed on patient side and PDS endoloop     Critical view obtained     Complications:   None    Procedure and Technique:  The patient was seen again in the Holding Room  The risks, benefits, complications, treatment options, and expected outcomes were discussed with the patient  The possibilities of reaction to medication, pulmonary aspiration, perforation of viscus, bleeding, recurrent infection, finding a normal gallbladder, the need for additional procedures, failure to diagnose a condition, the possible need to convert to an open procedure, and creating a complication requiring transfusion or operation were discussed with the patient  The patient and/or family concurred with the proposed plan, giving informed consent  The site of surgery properly noted/marked  The patient was taken to Operating Room, identified by name and birthdate and the procedure verified as Laparoscopic Cholecystectomy  A Time Out was held and the above information confirmed      Prior to the induction of general anesthesia, antibiotic prophylaxis was administered  General endotracheal anesthesia was then administered and tolerated well  After the induction, the abdomen was prepped in the usual sterile fashion  The patient was positioned in the supine position  along with some reverse Trendelenburg  Pneumoperitoneum was achieved by entrance with a veress needle in the left upper quadrant  Local anesthetic agent was injected into the skin near the umbilicus and an incision made  The midline fascia was incised and a 5mm port was inderserted under  direct vision  Additional trocars were introduced under direct vision, a 12mm subxiphoid port and additional 5mm in the right upper quadrant  All skin incisions were infiltrated with a local anesthetic agent before making the incision and placing the trocars  The gallbladder was identified, the fundus grasped and retracted cephalad  Adhesions were lysed bluntly and with the electrocautery where indicated, taking care not to injure any adjacent organs or viscus  The infundibulum was grasped and retracted laterally, exposing the peritoneum overlying the triangle of Calot  This was then divided and exposed in a blunt fashion  The cystic duct was clearly identified and bluntly dissected circumferentially  The junctions of the gallbladder, cystic duct and common bile duct were clearly identified prior to the division of any linear structure and photo documented  The cystic duct was then doubly ligated with surgical clips and suture on the patient side and singly clipped on the gallbladder side and divided  Due to the duct being large and to ensure that the clips were completely across a PDS endoloop was placed just below the cystic duct clips  The cystic artery was identified, dissected free, ligated with clips and divided as well  The gallbladder was dissected from the liver bed in retrograde fashion with the electrocautery  The gallbladder was removed   The liver bed was irrigated and inspected  Hemostasis was achieved with the electrocautery  Copious irrigation was utilized and was repeatedly aspirated until clear all particulate matter  Pneumoperitoneum was completely reduced after viewing removal of the trocars under direct vision  The wound was thoroughly irrigated and the fascia was then closed with a figure of eight suture; the skin was then closed with 4-0 monocryl and a sterile dressing was applied  Instrument, sponge, and needle counts were correct at closure and at the conclusion of the case  Osmar Saab, was present and scrubbed to assist with camera and retraction  A qualified resident was not available  I was present and scrubbed for the entire case       Patient Disposition:  PACU     SIGNATURE: Cedrick Espinoza MD  DATE: June 25, 2021  TIME: 11:50 AM

## 2021-06-25 NOTE — H&P
H&P reviewed  After examining the patient I find no changes in the patients condition since the H&P had been written  I saw and examined the patient who is agreeable to surgery  Risks/benefits and alternatives were explained to the patient including risk of bleeding, infection and damage to surrounding structures requiring need for additional operation  Patient is agreealbe to proceed   Since being seen in the operating room he has been feeling well eating a bland nonfat diet  He did have one episode of pain while he was eating fatty food  Physical Exam: General: AAOx3  Respiratory: BS b/l  Abdomen: Soft, NT, no rebound/guarding     Heart: RRR, S1s2  Ext: Warm no cyanosis       Vitals:    06/25/21 0902   BP: 143/94   Pulse: 89   Resp: 18   Temp: 99 1 °F (37 3 °C)   SpO2: 97%

## 2021-06-25 NOTE — PERIOPERATIVE NURSING NOTE
Patient received from PACU in 7/10 pain  Lap sites clean, dry and intact    VSS  Will continue to monitor til discharge criteria is met

## 2021-06-25 NOTE — ANESTHESIA POSTPROCEDURE EVALUATION
Post-Op Assessment Note    CV Status:  Stable  Pain Score: 0    Pain management: adequate     Mental Status:  Arousable and sleepy   Hydration Status:  Stable   PONV Controlled:  None   Airway Patency:  Patent      Post Op Vitals Reviewed: Yes      Staff: CRNA   Comments: Spontaneously breathing, HOB @ 30 degrees, simple mask to o2, fully endorsed to recovery w/o AC        No complications documented      BP (P) 126/77 (06/25/21 1202)    Temp (P) 97 7 °F (36 5 °C) (06/25/21 1202)    Pulse (P) 74 (06/25/21 1202)   Resp (P) 16 (06/25/21 1202)    SpO2 (P) 96 % (06/25/21 1202)

## 2021-06-25 NOTE — ANESTHESIA PREPROCEDURE EVALUATION
Procedure:  CHOLECYSTECTOMY LAPAROSCOPIC (N/A Abdomen)    Relevant Problems   CARDIO   (+) Elevated blood pressure, situational      NEURO/PSYCH   (+) Anxiety        Physical Exam    Airway    Mallampati score: II  TM Distance: >3 FB  Neck ROM: full     Dental   Comment: Poor dentition, No notable dental hx     Cardiovascular  Cardiovascular exam normal    Pulmonary  Pulmonary exam normal     Other Findings        Anesthesia Plan  ASA Score- 2     Anesthesia Type- general with ASA Monitors  Additional Monitors:   Airway Plan: ETT  Plan Factors-Exercise tolerance (METS): >4 METS  Chart reviewed  Imaging results reviewed  Existing labs reviewed  Patient summary reviewed  Patient is not a current smoker  Induction- intravenous  Postoperative Plan- Plan for postoperative opioid use  Informed Consent- Anesthetic plan and risks discussed with patient  I personally reviewed this patient with the CRNA  Discussed and agreed on the Anesthesia Plan with the CRNA  Nayan Brady

## 2021-06-30 ENCOUNTER — TELEPHONE (OUTPATIENT)
Dept: SURGERY | Facility: CLINIC | Age: 36
End: 2021-06-30

## 2021-07-02 ENCOUNTER — HOSPITAL ENCOUNTER (INPATIENT)
Facility: HOSPITAL | Age: 36
LOS: 1 days | Discharge: HOME | DRG: 189 | End: 2021-07-03
Attending: EMERGENCY MEDICINE | Admitting: SURGERY
Payer: COMMERCIAL

## 2021-07-02 ENCOUNTER — OFFICE VISIT (OUTPATIENT)
Dept: URGENT CARE | Facility: CLINIC | Age: 36
End: 2021-07-02
Payer: COMMERCIAL

## 2021-07-02 VITALS
HEART RATE: 107 BPM | SYSTOLIC BLOOD PRESSURE: 128 MMHG | WEIGHT: 211 LBS | BODY MASS INDEX: 28.62 KG/M2 | OXYGEN SATURATION: 97 % | DIASTOLIC BLOOD PRESSURE: 92 MMHG | TEMPERATURE: 97.1 F | RESPIRATION RATE: 20 BRPM

## 2021-07-02 DIAGNOSIS — L29.9 ITCHING: Primary | ICD-10-CM

## 2021-07-02 DIAGNOSIS — R17 ELEVATED BILIRUBIN: ICD-10-CM

## 2021-07-02 DIAGNOSIS — L29.9 PRURITUS: Primary | ICD-10-CM

## 2021-07-02 DIAGNOSIS — L29.9 PRURITUS: ICD-10-CM

## 2021-07-02 LAB
ALBUMIN SERPL BCP-MCNC: 3.9 G/DL (ref 3.5–5)
ALP SERPL-CCNC: 123 U/L (ref 46–116)
ALT SERPL W P-5'-P-CCNC: 678 U/L (ref 12–78)
ANION GAP SERPL CALCULATED.3IONS-SCNC: 10 MMOL/L (ref 4–13)
AST SERPL W P-5'-P-CCNC: 211 U/L (ref 5–45)
BASOPHILS # BLD AUTO: 0.04 THOUSANDS/ΜL (ref 0–0.1)
BASOPHILS NFR BLD AUTO: 1 % (ref 0–1)
BILIRUB DIRECT SERPL-MCNC: 1.02 MG/DL (ref 0–0.2)
BILIRUB SERPL-MCNC: 1.52 MG/DL (ref 0.2–1)
BILIRUB UR QL STRIP: NEGATIVE
BUN SERPL-MCNC: 12 MG/DL (ref 5–25)
CALCIUM SERPL-MCNC: 9.2 MG/DL (ref 8.3–10.1)
CHLORIDE SERPL-SCNC: 101 MMOL/L (ref 100–108)
CLARITY UR: CLEAR
CO2 SERPL-SCNC: 29 MMOL/L (ref 21–32)
COLOR UR: YELLOW
CREAT SERPL-MCNC: 1.21 MG/DL (ref 0.6–1.3)
EOSINOPHIL # BLD AUTO: 0.1 THOUSAND/ΜL (ref 0–0.61)
EOSINOPHIL NFR BLD AUTO: 1 % (ref 0–6)
ERYTHROCYTE [DISTWIDTH] IN BLOOD BY AUTOMATED COUNT: 12.8 % (ref 11.6–15.1)
GFR SERPL CREATININE-BSD FRML MDRD: 77 ML/MIN/1.73SQ M
GLUCOSE SERPL-MCNC: 126 MG/DL (ref 65–140)
GLUCOSE UR STRIP-MCNC: NEGATIVE MG/DL
HCT VFR BLD AUTO: 51.7 % (ref 36.5–49.3)
HGB BLD-MCNC: 16.3 G/DL (ref 12–17)
HGB UR QL STRIP.AUTO: NEGATIVE
IMM GRANULOCYTES # BLD AUTO: 0.02 THOUSAND/UL (ref 0–0.2)
IMM GRANULOCYTES NFR BLD AUTO: 0 % (ref 0–2)
KETONES UR STRIP-MCNC: NEGATIVE MG/DL
LEUKOCYTE ESTERASE UR QL STRIP: NEGATIVE
LYMPHOCYTES # BLD AUTO: 2.29 THOUSANDS/ΜL (ref 0.6–4.47)
LYMPHOCYTES NFR BLD AUTO: 27 % (ref 14–44)
MAGNESIUM SERPL-MCNC: 2.3 MG/DL (ref 1.6–2.6)
MCH RBC QN AUTO: 28.2 PG (ref 26.8–34.3)
MCHC RBC AUTO-ENTMCNC: 31.5 G/DL (ref 31.4–37.4)
MCV RBC AUTO: 89 FL (ref 82–98)
MONOCYTES # BLD AUTO: 0.63 THOUSAND/ΜL (ref 0.17–1.22)
MONOCYTES NFR BLD AUTO: 7 % (ref 4–12)
NEUTROPHILS # BLD AUTO: 5.43 THOUSANDS/ΜL (ref 1.85–7.62)
NEUTS SEG NFR BLD AUTO: 64 % (ref 43–75)
NITRITE UR QL STRIP: NEGATIVE
NRBC BLD AUTO-RTO: 0 /100 WBCS
PH UR STRIP.AUTO: 6 [PH]
PLATELET # BLD AUTO: 293 THOUSANDS/UL (ref 149–390)
PMV BLD AUTO: 9.8 FL (ref 8.9–12.7)
POTASSIUM SERPL-SCNC: 4 MMOL/L (ref 3.5–5.3)
PROT SERPL-MCNC: 8.1 G/DL (ref 6.4–8.2)
PROT UR STRIP-MCNC: NEGATIVE MG/DL
RBC # BLD AUTO: 5.78 MILLION/UL (ref 3.88–5.62)
SODIUM SERPL-SCNC: 140 MMOL/L (ref 136–145)
SP GR UR STRIP.AUTO: 1.01 (ref 1–1.03)
UROBILINOGEN UR QL STRIP.AUTO: 0.2 E.U./DL
WBC # BLD AUTO: 8.51 THOUSAND/UL (ref 4.31–10.16)

## 2021-07-02 PROCEDURE — 99284 EMERGENCY DEPT VISIT MOD MDM: CPT

## 2021-07-02 PROCEDURE — 99213 OFFICE O/P EST LOW 20 MIN: CPT | Performed by: FAMILY MEDICINE

## 2021-07-02 PROCEDURE — 99284 EMERGENCY DEPT VISIT MOD MDM: CPT | Performed by: EMERGENCY MEDICINE

## 2021-07-02 PROCEDURE — 85025 COMPLETE CBC W/AUTO DIFF WBC: CPT | Performed by: EMERGENCY MEDICINE

## 2021-07-02 PROCEDURE — 36415 COLL VENOUS BLD VENIPUNCTURE: CPT | Performed by: EMERGENCY MEDICINE

## 2021-07-02 PROCEDURE — 81003 URINALYSIS AUTO W/O SCOPE: CPT | Performed by: EMERGENCY MEDICINE

## 2021-07-02 PROCEDURE — 80048 BASIC METABOLIC PNL TOTAL CA: CPT | Performed by: EMERGENCY MEDICINE

## 2021-07-02 PROCEDURE — 80076 HEPATIC FUNCTION PANEL: CPT | Performed by: EMERGENCY MEDICINE

## 2021-07-02 PROCEDURE — 83735 ASSAY OF MAGNESIUM: CPT | Performed by: EMERGENCY MEDICINE

## 2021-07-02 RX ORDER — OMEPRAZOLE 20 MG/1
20 CAPSULE, DELAYED RELEASE ORAL DAILY
COMMUNITY
End: 2021-10-01

## 2021-07-02 RX ORDER — DIPHENHYDRAMINE HYDROCHLORIDE 50 MG/ML
50 INJECTION INTRAMUSCULAR; INTRAVENOUS ONCE
Status: DISCONTINUED | OUTPATIENT
Start: 2021-07-02 | End: 2021-07-02

## 2021-07-02 NOTE — PROGRESS NOTES
[facility]        NAME: Sabas Molina is a 38 y o  male  : 1985    MRN: [id number]  DATE: 2021  TIME: 7:53 PM    Assessment and Plan   Pruritus [L29 9]  1  Pruritus       Pruritus s/p laparoscopic cholecystectomy for cholelithiasis raises concern for possible biliary obstruction; unable to rule out without an intraoperative cholangiogram   Due to past history, this may also be psychological   May benefit from obtaining a urine/blood bilirubin level  Advised to call his PCP for further evaluation and management  Strongly advised that should he develop a fever, he should go to the ED immediately for rule out ascending cholangitis  Patient Instructions     Follow up with PCP in 3-5 days  Proceed to  ER if symptoms worsen  Chief Complaint     Chief Complaint   Patient presents with    Rash     had surgery on friday the   on monday he started itching  states it is severe without a rash  has not taken anything for it  History of Present Illness       25-year-old male status post laparoscopic cholecystectomy postop day 7 presents today with persistent generalized pruritus without any rash or skin color changes  Denies any obvious fevers, chills or abdominal pain (outside of postoperative discomfort)  Of note, does admit to anxiety and has a past history of idiopathic pruritus  Symptoms started 4 days ago associated with nausea and vomiting which has since resolved  Abdominal pain is much improved  Review of Systems   Review of Systems   Constitutional: Negative for chills and fever  Respiratory: Negative for cough, shortness of breath and wheezing  Cardiovascular: Negative for chest pain  Gastrointestinal: Positive for abdominal pain and nausea  Skin: Negative for color change and rash  Neurological: Negative for dizziness and headaches           Current Medications       Current Outpatient Medications:     citalopram (CeleXA) 10 mg tablet, Take 1 tablet (10 mg total) by mouth daily (Patient taking differently: Take 10 mg by mouth daily Every other day), Disp: 30 tablet, Rfl: 2    famotidine (PEPCID) 20 mg tablet, Take 1 tablet (20 mg total) by mouth 2 (two) times a day (Patient not taking: Reported on 7/2/2021), Disp: 60 tablet, Rfl: 0    ibuprofen (MOTRIN) 600 mg tablet, Take 1 tablet (600 mg total) by mouth every 6 (six) hours as needed for mild pain (Patient not taking: Reported on 4/1/2020), Disp: 20 tablet, Rfl: 0    omeprazole (PriLOSEC) 20 mg delayed release capsule, Take 20 mg by mouth daily (Patient not taking: Reported on 7/2/2021), Disp: , Rfl:     oxyCODONE-acetaminophen (PERCOCET) 5-325 mg per tablet, Take 1 tablet by mouth every 4 (four) hours as needed for moderate pain for up to 10 daysMax Daily Amount: 6 tablets (Patient not taking: Reported on 7/2/2021), Disp: 10 tablet, Rfl: 0    Current Allergies     Allergies as of 07/02/2021    (No Known Allergies)            The following portions of the patient's history were reviewed and updated as appropriate: allergies, current medications, past family history, past medical history, past social history, past surgical history and problem list      Past Medical History:   Diagnosis Date    Anxiety     Anxiety and depression     Psychiatric disorder        Past Surgical History:   Procedure Laterality Date    NO PAST SURGERIES      CO LAP,CHOLECYSTECTOMY N/A 6/25/2021    Procedure: CHOLECYSTECTOMY LAPAROSCOPIC;  Surgeon: Jonnathan Vargas MD;  [facility];  Service: General    WISDOM TOOTH EXTRACTION         Family History   Problem Relation Age of Onset    Anxiety disorder Father     Hypertension Father          Medications have been verified  Objective   /92   Pulse (!) 107   Temp (!) 97 1 °F (36 2 °C)   Resp 20   Wt 95 7 kg (211 lb)   SpO2 97%   BMI 28 62 kg/m²   No LMP for male patient  Physical Exam     Physical Exam  Vitals and nursing note reviewed  Constitutional:       General: He is in acute distress  Appearance: Normal appearance  He is normal weight  He is not ill-appearing, toxic-appearing or diaphoretic  HENT:      Head: Normocephalic and atraumatic  Eyes:      General:         Right eye: No discharge  Left eye: No discharge  Extraocular Movements: Extraocular movements intact  Pupils: Pupils are equal, round, and reactive to light  Comments: Questionably jaundiced conjunctiva   Pulmonary:      Effort: Pulmonary effort is normal    Skin:     General: Skin is warm  Coloration: Skin is not jaundiced  Findings: No erythema  Neurological:      General: No focal deficit present  Mental Status: He is alert and oriented to person, place, and time  Psychiatric:         Mood and Affect: Mood normal          Behavior: Behavior normal          Thought Content:  Thought content normal          Judgment: Judgment normal

## 2021-07-03 ENCOUNTER — APPOINTMENT (EMERGENCY)
Dept: RADIOLOGY | Facility: HOSPITAL | Age: 36
DRG: 189 | End: 2021-07-03
Payer: COMMERCIAL

## 2021-07-03 ENCOUNTER — APPOINTMENT (INPATIENT)
Dept: RADIOLOGY | Facility: HOSPITAL | Age: 36
DRG: 189 | End: 2021-07-03
Payer: COMMERCIAL

## 2021-07-03 VITALS
TEMPERATURE: 98.5 F | SYSTOLIC BLOOD PRESSURE: 144 MMHG | RESPIRATION RATE: 18 BRPM | OXYGEN SATURATION: 97 % | HEART RATE: 73 BPM | DIASTOLIC BLOOD PRESSURE: 102 MMHG

## 2021-07-03 PROBLEM — L29.9 PRURITUS: Status: ACTIVE | Noted: 2021-07-03

## 2021-07-03 PROCEDURE — G1004 CDSM NDSC: HCPCS

## 2021-07-03 PROCEDURE — 76376 3D RENDER W/INTRP POSTPROCES: CPT

## 2021-07-03 PROCEDURE — 99024 POSTOP FOLLOW-UP VISIT: CPT | Performed by: SURGERY

## 2021-07-03 PROCEDURE — 74177 CT ABD & PELVIS W/CONTRAST: CPT

## 2021-07-03 PROCEDURE — 74181 MRI ABDOMEN W/O CONTRAST: CPT

## 2021-07-03 RX ORDER — DIPHENHYDRAMINE HCL 25 MG
25 TABLET ORAL EVERY 8 HOURS PRN
Status: DISCONTINUED | OUTPATIENT
Start: 2021-07-03 | End: 2021-07-03 | Stop reason: HOSPADM

## 2021-07-03 RX ORDER — IBUPROFEN 600 MG/1
600 TABLET ORAL EVERY 6 HOURS PRN
Status: DISCONTINUED | OUTPATIENT
Start: 2021-07-03 | End: 2021-07-03 | Stop reason: HOSPADM

## 2021-07-03 RX ORDER — NICOTINE 21 MG/24HR
14 PATCH, TRANSDERMAL 24 HOURS TRANSDERMAL DAILY
Status: DISCONTINUED | OUTPATIENT
Start: 2021-07-03 | End: 2021-07-03 | Stop reason: HOSPADM

## 2021-07-03 RX ORDER — OXYCODONE HYDROCHLORIDE AND ACETAMINOPHEN 5; 325 MG/1; MG/1
1 TABLET ORAL EVERY 4 HOURS PRN
Status: DISCONTINUED | OUTPATIENT
Start: 2021-07-03 | End: 2021-07-03

## 2021-07-03 RX ORDER — DIPHENHYDRAMINE HCL 25 MG
25 TABLET ORAL EVERY 6 HOURS PRN
Qty: 10 TABLET | Refills: 0 | Status: SHIPPED | OUTPATIENT
Start: 2021-07-03 | End: 2021-10-01

## 2021-07-03 RX ORDER — ONDANSETRON 4 MG/1
4 TABLET, ORALLY DISINTEGRATING ORAL EVERY 6 HOURS PRN
Status: DISCONTINUED | OUTPATIENT
Start: 2021-07-03 | End: 2021-07-03

## 2021-07-03 RX ORDER — NICOTINE 21 MG/24HR
1 PATCH, TRANSDERMAL 24 HOURS TRANSDERMAL DAILY
Status: DISCONTINUED | OUTPATIENT
Start: 2021-07-03 | End: 2021-07-03

## 2021-07-03 RX ADMIN — NICOTINE 14 MG: 14 PATCH, EXTENDED RELEASE TRANSDERMAL at 10:15

## 2021-07-03 RX ADMIN — DIPHENHYDRAMINE HYDROCHLORIDE 25 MG: 25 TABLET ORAL at 10:11

## 2021-07-03 RX ADMIN — NICOTINE 1 PATCH: 14 PATCH, EXTENDED RELEASE TRANSDERMAL at 03:56

## 2021-07-03 RX ADMIN — IOHEXOL 100 ML: 350 INJECTION, SOLUTION INTRAVENOUS at 00:20

## 2021-07-03 NOTE — UTILIZATION REVIEW
Initial Clinical Review    Admission: Date/Time/Statement:   Admission Orders (From admission, onward)     Ordered        07/03/21 0125  INPATIENT ADMISSION  Once                   Orders Placed This Encounter   Procedures    INPATIENT ADMISSION     Standing Status:   Standing     Number of Occurrences:   1     Order Specific Question:   Level of Care     Answer:   Med Surg [16]     Order Specific Question:   Estimated length of stay     Answer:   More than 2 Midnights     Order Specific Question:   Certification     Answer:   I certify that inpatient services are medically necessary for this patient for a duration of greater than two midnights  See H&P and MD Progress Notes for additional information about the patient's course of treatment  ED Arrival Information     Expected Arrival Acuity    - 7/2/2021 22:16 Less Urgent         Means of arrival Escorted by Service Admission type    Walk-In Self Surgery-General Urgent         Arrival complaint    itching, jaundice        Chief Complaint   Patient presents with    Itching     gallbladder removed a week ago, woke up with nausea 4 days ago, started with itching and progressing, driving him nuts  seen at urgent care today       Initial Presentation:     45year old male presents to ed from home for evaluation and treatment of nausea and itching  PMHX : CHOLESYSTECTOMY 2 WEEKS AGO  Clinical assessment significant forn abdominal pain, dark urine, puritis,  bilirubin 1 5  Admit to inpatient for possible retained common bile duct stone  Plan includes MRCP  Date: 7-3 -21    Day 2: inpatient     MRCP completed with normal results       ED Triage Vitals [07/02/21 2235]   97 5 °F (36 4 °C) (!) 108 (!) 24 156/70 97 %      Tympanic Monitor         Pain Score       5          07/02/21 95 7 kg (211 lb)     Additional Vital Signs:     Vitals:    07/03/21 0315 07/03/21 0317 07/03/21 0759 07/03/21 0800   BP: 148/83 148/83 (!) 144/102 (!) 144/102   Pulse:  67 73 Resp: 17 17 18 18   Temp:  98 5 °F (36 9 °C) 98 5 °F (36 9 °C) 98 5 °F (36 9 °C)   TempSrc:  Oral     SpO2:  95% 97%          Pertinent Labs/Diagnostic Test Results:     MRI abdomen wo contrast and mrcp   Final (07/03 1303)      No choledocholithiasis  MRCP is normal       Status post cholecystectomy  Trace fluid in the cholecystectomy bed, likely postsurgical          CT abdomen pelvis with contrast   Final  (07/03 0038)      Status post cholecystectomy with minimal inflammation in the surgical bed which may be due to postsurgical changes, cannot rule out postsurgical complication such as infection or leak              Results from last 7 days   Lab Units 07/02/21  2321   WBC Thousand/uL 8 51   HEMOGLOBIN g/dL 16 3   HEMATOCRIT % 51 7*   PLATELETS Thousands/uL 293   NEUTROS ABS Thousands/µL 5 43         Results from last 7 days   Lab Units 07/02/21  2321   SODIUM mmol/L 140   POTASSIUM mmol/L 4 0   CHLORIDE mmol/L 101   CO2 mmol/L 29   ANION GAP mmol/L 10   BUN mg/dL 12   CREATININE mg/dL 1 21   EGFR ml/min/1 73sq m 77   CALCIUM mg/dL 9 2   MAGNESIUM mg/dL 2 3     Results from last 7 days   Lab Units 07/02/21  2321   AST U/L 211*   ALT U/L 678*   ALK PHOS U/L 123*   TOTAL PROTEIN g/dL 8 1   ALBUMIN g/dL 3 9   TOTAL BILIRUBIN mg/dL 1 52*   BILIRUBIN DIRECT mg/dL 1 02*         Results from last 7 days   Lab Units 07/02/21  2321   GLUCOSE RANDOM mg/dL 126         Results from last 7 days   Lab Units 07/02/21  2324   CLARITY UA  Clear   COLOR UA  Yellow   SPEC GRAV UA  1 010   PH UA  6 0   GLUCOSE UA mg/dl Negative   KETONES UA mg/dl Negative   BLOOD UA  Negative   PROTEIN UA mg/dl Negative   NITRITE UA  Negative   BILIRUBIN UA  Negative   UROBILINOGEN UA E U /dl 0 2   LEUKOCYTES UA  Negative       ED Treatment:   Medication Administration from 07/02/2021 2216 to 07/03/2021 0258    None         Past Medical History:   Diagnosis    Anxiety    Anxiety and depression    Psychiatric disorder     Present on Admission:   Pruritus      Admitting Diagnosis    Itching [L29 9]  Elevated bilirubin [R17]    Age/Sex: 38 y o  male    Scheduled Medications:  nicotine, 14 mg, Transdermal, Daily      Continuous IV Infusions:     PRN Meds:  diphenhydrAMINE, 25 mg, Oral, Q8H PRN  ibuprofen, 600 mg, Oral, Q6H PRN  ondansetron, 4 mg, Oral, Q6H PRN  oxyCODONE-acetaminophen, 1 tablet, Oral, Q4H PRN        None    Network Utilization Review Department  ATTENTION: Please call with any questions or concerns to [phone] and carefully listen to the prompts so that you are directed to the right person  All voicemails are confidential   Elnor Raider all requests for admission clinical reviews, approved or denied determinations and any other requests to dedicated fax number below belonging to the campus where the patient is receiving treatment   List of dedicated fax numbers for the Facilities:  [address] DENIALS (Administrative/Medical Necessity) [phone]   [address] (Maternity/NICU/Pediatrics) [phone]   [address] [place] [address] [address] [place] [address] [address] [place] [address] [address] [facility] [phone]

## 2021-07-03 NOTE — PLAN OF CARE
Problem: Potential for Falls  Goal: Patient will remain free of falls  Description: INTERVENTIONS:  - Educate patient/family on patient safety including physical limitations  - Instruct patient to call for assistance with activity   - Consult OT/PT to assist with strengthening/mobility   - Keep Call bell within reach  - Keep bed low and locked with side rails adjusted as appropriate  - Keep care items and personal belongings within reach  - Initiate and maintain comfort rounds  - Make Fall Risk Sign visible to staff  - Offer Toileting every 4 Hours, in advance of need    - Obtain necessary fall risk management equipment:  - Apply yellow socks and bracelet for high fall risk patients  - Consider moving patient to room near nurses station  Outcome: Progressing

## 2021-07-03 NOTE — ED PROVIDER NOTES
History  Chief Complaint   Patient presents with    Itching     gallbladder removed a week ago, woke up with nausea 4 days ago, started with itching and progressing, driving him nuts  seen at urgent care today     39 y/o male presents with generalized itching started a few days ago with nausea  Denies any abdominal pain,fevers,chills,dysuria, urgency or frequency, no constipation or diarrhea, no rash noted  Patient had a cholecystectomy done last week for symptomatic cholelithiasis  History provided by:  Patient   used: No        Prior to Admission Medications   Prescriptions Last Dose Informant Patient Reported?  Taking?   citalopram (CeleXA) 10 mg tablet   No No   Sig: Take 1 tablet (10 mg total) by mouth daily   Patient taking differently: Take 10 mg by mouth daily Every other day   famotidine (PEPCID) 20 mg tablet   No No   Sig: Take 1 tablet (20 mg total) by mouth 2 (two) times a day   Patient not taking: Reported on 7/2/2021   ibuprofen (MOTRIN) 600 mg tablet   No No   Sig: Take 1 tablet (600 mg total) by mouth every 6 (six) hours as needed for mild pain   Patient not taking: Reported on 4/1/2020   omeprazole (PriLOSEC) 20 mg delayed release capsule   Yes No   Sig: Take 20 mg by mouth daily   Patient not taking: Reported on 7/2/2021   omeprazole (PriLOSEC) 20 mg delayed release capsule   Yes Yes   Sig: Take 20 mg by mouth daily   oxyCODONE-acetaminophen (PERCOCET) 5-325 mg per tablet   No No   Sig: Take 1 tablet by mouth every 4 (four) hours as needed for moderate pain for up to 10 daysMax Daily Amount: 6 tablets   Patient not taking: Reported on 7/2/2021      Facility-Administered Medications: None       Past Medical History:   Diagnosis Date    Anxiety     Anxiety and depression     Psychiatric disorder        Past Surgical History:   Procedure Laterality Date    NO PAST SURGERIES      [place] LAP,CHOLECYSTECTOMY N/A 6/25/2021    Procedure: CHOLECYSTECTOMY LAPAROSCOPIC;  Surgeon: Sylvia Cast MD;  Location: [facility];  Service: General    WISDOM TOOTH EXTRACTION         Family History   Problem Relation Age of Onset    Anxiety disorder Father     Hypertension Father      I have reviewed and agree with the history as documented  E-Cigarette/Vaping    E-Cigarette Use Never User      E-Cigarette/Vaping Substances    Nicotine No     THC No     CBD No     Flavoring No     Other No     Unknown No      Social History     Tobacco Use    Smoking status: Former Smoker     Quit date: 2016     Years since quittin 0    Smokeless tobacco: Current User     Types: Chew   Vaping Use    Vaping Use: Never used   Substance Use Topics    Alcohol use: No    Drug use: No       Review of Systems   Constitutional: Negative  HENT: Negative  Eyes: Negative  Respiratory: Negative  Cardiovascular: Negative  Gastrointestinal: Positive for nausea  Endocrine: Negative  Genitourinary: Negative  Musculoskeletal: Negative  Skin: Negative  Itching     Allergic/Immunologic: Negative  Neurological: Negative  Hematological: Negative  Psychiatric/Behavioral: Negative  All other systems reviewed and are negative  Physical Exam  Physical Exam  Constitutional:       Appearance: Normal appearance  HENT:      Head: Normocephalic and atraumatic  Nose: Nose normal       Mouth/Throat:      Mouth: Mucous membranes are moist    Eyes:      Extraocular Movements: Extraocular movements intact  Pupils: Pupils are equal, round, and reactive to light  Cardiovascular:      Rate and Rhythm: Normal rate and regular rhythm  Pulmonary:      Effort: Pulmonary effort is normal       Breath sounds: Normal breath sounds  Abdominal:      General: Abdomen is flat  Bowel sounds are normal  There is no distension  Palpations: Abdomen is soft  There is no mass  Tenderness: There is no abdominal tenderness   There is no right CVA tenderness, left CVA tenderness, guarding or rebound  Hernia: No hernia is present  Musculoskeletal:         General: Normal range of motion  Cervical back: Normal range of motion and neck supple  Skin:     General: Skin is warm  Capillary Refill: Capillary refill takes less than 2 seconds  Coloration: Skin is jaundiced  Neurological:      General: No focal deficit present  Mental Status: He is alert and oriented to person, place, and time  Mental status is at baseline  Psychiatric:         Mood and Affect: Mood normal          Thought Content:  Thought content normal          Vital Signs  ED Triage Vitals [07/02/21 2235]   Temperature Pulse Respirations Blood Pressure SpO2   97 5 °F (36 4 °C) (!) 108 (!) 24 156/70 97 %      Temp Source Heart Rate Source Patient Position - Orthostatic VS BP Location FiO2 (%)   Tympanic Monitor Sitting Right arm --      Pain Score       5           Vitals:    07/03/21 0054 07/03/21 0200 07/03/21 0315 07/03/21 0317   BP:  125/78 148/83 148/83   Pulse: 74 66  67   Patient Position - Orthostatic VS:             Visual Acuity      ED Medications  Medications   ondansetron (ZOFRAN-ODT) dispersible tablet 4 mg (has no administration in time range)   ibuprofen (MOTRIN) tablet 600 mg (has no administration in time range)   oxyCODONE-acetaminophen (PERCOCET) 5-325 mg per tablet 1 tablet (has no administration in time range)   diphenhydrAMINE (BENADRYL) tablet 25 mg (has no administration in time range)   nicotine (NICODERM CQ) 14 mg/24hr TD 24 hr patch 1 patch (1 patch Transdermal Medication Applied 7/3/21 0356)   iohexol (OMNIPAQUE) 350 MG/ML injection (SINGLE-DOSE) 100 mL (100 mL Intravenous Given 7/3/21 0020)       Diagnostic Studies  Results Reviewed     Procedure Component Value Units Date/Time    Hepatic function panel [[id number]]  (Abnormal) Collected: 07/02/21 2321    Lab Status: Final result Specimen: Blood from Arm, Left Updated: 07/02/21 2342     Total Bilirubin 1 52 mg/dL      Bilirubin, Direct 1 02 mg/dL      Alkaline Phosphatase 123 U/L       U/L       U/L      Total Protein 8 1 g/dL      Albumin 3 9 g/dL     Basic metabolic panel [[id number]] Collected: 07/02/21 2321    Lab Status: Final result Specimen: Blood from Arm, Left Updated: 07/02/21 2341     Sodium 140 mmol/L      Potassium 4 0 mmol/L      Chloride 101 mmol/L      CO2 29 mmol/L      ANION GAP 10 mmol/L      BUN 12 mg/dL      Creatinine 1 21 mg/dL      Glucose 126 mg/dL      Calcium 9 2 mg/dL      eGFR 77 ml/min/1 73sq m     Narrative:      [place] guidelines for Chronic Kidney Disease (CKD):     Stage 1 with normal or high GFR (GFR > 90 mL/min/1 73 square meters)    Stage 2 Mild CKD (GFR = 60-89 mL/min/1 73 square meters)    Stage 3A Moderate CKD (GFR = 45-59 mL/min/1 73 square meters)    Stage 3B Moderate CKD (GFR = 30-44 mL/min/1 73 square meters)    Stage 4 Severe CKD (GFR = 15-29 mL/min/1 73 square meters)    Stage 5 End Stage CKD (GFR <15 mL/min/1 73 square meters)  Note: GFR calculation is accurate only with a steady state creatinine    Magnesium [[id number]]  (Normal) Collected: 07/02/21 2321    Lab Status: Final result Specimen: Blood from Arm, Left Updated: 07/02/21 2341     Magnesium 2 3 mg/dL     UA (URINE) with reflex to Scope [[id number]] Collected: 07/02/21 2324    Lab Status: Final result Specimen: Urine, Clean Catch Updated: 07/02/21 2329     Color, UA Yellow     Clarity, UA Clear     Specific Gravity, UA 1 010     pH, UA 6 0     Leukocytes, UA Negative     Nitrite, UA Negative     Protein, UA Negative mg/dl      Glucose, UA Negative mg/dl      Ketones, UA Negative mg/dl      Urobilinogen, UA 0 2 E U /dl      Bilirubin, UA Negative     Blood, UA Negative    CBC and differential [[id number]]  (Abnormal) Collected: 07/02/21 2321    Lab Status: Final result Specimen: Blood from Arm, Left Updated: 07/02/21 2326     WBC 8 51 Thousand/uL      RBC 5 78 Million/uL Hemoglobin 16 3 g/dL      Hematocrit 51 7 %      MCV 89 fL      MCH 28 2 pg      MCHC 31 5 g/dL      RDW 12 8 %      MPV 9 8 fL      Platelets 266 Thousands/uL      nRBC 0 /100 WBCs      Neutrophils Relative 64 %      Immat GRANS % 0 %      Lymphocytes Relative 27 %      Monocytes Relative 7 %      Eosinophils Relative 1 %      Basophils Relative 1 %      Neutrophils Absolute 5 43 Thousands/µL      Immature Grans Absolute 0 02 Thousand/uL      Lymphocytes Absolute 2 29 Thousands/µL      Monocytes Absolute 0 63 Thousand/µL      Eosinophils Absolute 0 10 Thousand/µL      Basophils Absolute 0 04 Thousands/µL                  CT abdomen pelvis with contrast   Final Result by Beau Burks DO (07/03 1173)      Status post cholecystectomy with minimal inflammation in the surgical bed which may be due to postsurgical changes, cannot rule out postsurgical complication such as infection or leak  The study was marked in [facility] for immediate notification              Workstation performed: [id number]         MRI inpatient order    (Results Pending)              Procedures  Procedures         ED Course                                           MDM  Number of Diagnoses or Management Options     Amount and/or Complexity of Data Reviewed  Clinical lab tests: ordered and reviewed  Tests in the radiology section of CPT®: ordered and reviewed  Tests in the medicine section of CPT®: ordered and reviewed    Patient Progress  Patient progress: stable      Disposition  Final diagnoses:   Itching   Elevated bilirubin     Time reflects when diagnosis was documented in both MDM as applicable and the Disposition within this note     Time User Action Codes Description Comment    7/3/2021  1:25 AM Anepu, Elda Add [L50 9] Urticaria     7/3/2021  1:25 AM Anepu, Elda Remove [L50 9] Urticaria     7/3/2021  1:25 AM Anepu, Elda Add [L29 9] Itching     7/3/2021  1:25 AM Anepu, Elda Add [R17] Elevated bilirubin       ED Disposition     ED Disposition Condition Date/Time Comment    Admit Stable Sat Jul 3, 2021  1:25 AM          Follow-up Information    None         Current Discharge Medication List      CONTINUE these medications which have NOT CHANGED    Details   !! omeprazole (PriLOSEC) 20 mg delayed release capsule Take 20 mg by mouth daily      citalopram (CeleXA) 10 mg tablet Take 1 tablet (10 mg total) by mouth daily  Qty: 30 tablet, Refills: 2    Associated Diagnoses: Anxiety      famotidine (PEPCID) 20 mg tablet Take 1 tablet (20 mg total) by mouth 2 (two) times a day  Qty: 60 tablet, Refills: 0    Associated Diagnoses: Gastroesophageal reflux disease, unspecified whether esophagitis present      ibuprofen (MOTRIN) 600 mg tablet Take 1 tablet (600 mg total) by mouth every 6 (six) hours as needed for mild pain  Qty: 20 tablet, Refills: 0    Associated Diagnoses: Periapical abscess      !! omeprazole (PriLOSEC) 20 mg delayed release capsule Take 20 mg by mouth daily      oxyCODONE-acetaminophen (PERCOCET) 5-325 mg per tablet Take 1 tablet by mouth every 4 (four) hours as needed for moderate pain for up to 10 daysMax Daily Amount: 6 tablets  Qty: 10 tablet, Refills: 0    Associated Diagnoses: Calculus of gallbladder without cholecystitis without obstruction       !! - Potential duplicate medications found  Please discuss with provider  No discharge procedures on file      PDMP Review     None          ED Provider  Electronically Signed by           Jesus Manuel Orozco DO  07/03/21 7701

## 2021-07-03 NOTE — H&P
H&P Exam - General Surgery   Sophie Abarca 38 y o  male MRN: [id number]  Unit/Bed#: [address] [place] [address] Encounter: [id number]    Assessment/Plan     Assessment:  Pruritus and bilirubin 1 5 in the setting of laparoscopic cholecystectomy last week  I explained to the patient that he could of had a retained stone that passed or that his labs could be translating elevated postoperatively and that this would resolve on its own  With no intra-abdominal free fluid, I doubt a biliary leak  Best plan would be to rule out retained common bile duct stone  Plan:   MRCP this morning  Plan following MRCP      History of Present Illness       HPI:  Sophie Abarca is a 38 y o  male who had a history of laparoscopic cholecystectomy for symptomatic cholelithiasis last week  Had a relatively uneventful hospital course and discharge  Earlier this week, he reports some dark urine and some abdominal discomfort with nausea  He also reports some dark urine which he believes was due to dehydration but is not certain  This passed over the following 2 days or so and then the pruritis began  He presented to the emergency department for evaluation was performed  He was noted to have minimally elevated transaminases and a bilirubin of 1 5  A CT scan of abdomen was unremarkable and there was no free fluid  He reports minimal abdominal pain  He denies nausea, vomiting, or abnormal bowel movements  Reports passing flatus and having bowel movements  Denies any fevers chills  Review of Systems   Constitutional: Negative for chills and fever  HENT: Negative  Respiratory: Negative  Cardiovascular: Negative  Gastrointestinal: Negative  Genitourinary:        Patient reports dark urine few days ago but then reported that it cleared up  He attributed this to some dehydration   Musculoskeletal: Negative  Skin:        See HPI  Patient denies rash but reports intense pruritis  Hematological: Negative      Psychiatric/Behavioral: Patient reports history of anxiety and is concerned that something serious might be wrong with there was a problem with the surgery   All other systems reviewed and are negative  Historical Information   Past Medical History:   Diagnosis Date    Anxiety     Anxiety and depression     Psychiatric disorder      Past Surgical History:   Procedure Laterality Date    NO PAST SURGERIES      [place] LAP,CHOLECYSTECTOMY N/A 2021    Procedure: CHOLECYSTECTOMY LAPAROSCOPIC;  Surgeon: Reji Marie MD;  Location: [address] [place] [address];  Service: General    WISDOM TOOTH EXTRACTION       Social History   Social History     Substance and Sexual Activity   Alcohol Use No     Social History     Substance and Sexual Activity   Drug Use No     Social History     Tobacco Use   Smoking Status Former Smoker    Quit date: 2016    Years since quittin 0   Smokeless Tobacco Current User    Types: Chew     E-Cigarette/Vaping    E-Cigarette Use Never User      E-Cigarette/Vaping Substances    Nicotine No     THC No     CBD No     Flavoring No     Other No     Unknown No      Family History: non-contributory    Meds/Allergies   PTA meds:   Prior to Admission Medications   Prescriptions Last Dose Informant Patient Reported?  Taking?   citalopram (CeleXA) 10 mg tablet   No No   Sig: Take 1 tablet (10 mg total) by mouth daily   Patient taking differently: Take 10 mg by mouth daily Every other day   famotidine (PEPCID) 20 mg tablet   No No   Sig: Take 1 tablet (20 mg total) by mouth 2 (two) times a day   Patient not taking: Reported on 2021   ibuprofen (MOTRIN) 600 mg tablet   No No   Sig: Take 1 tablet (600 mg total) by mouth every 6 (six) hours as needed for mild pain   Patient not taking: Reported on 2020   omeprazole (PriLOSEC) 20 mg delayed release capsule   Yes No   Sig: Take 20 mg by mouth daily   Patient not taking: Reported on 2021   omeprazole (PriLOSEC) 20 mg delayed release capsule   Yes Yes   Sig: Take 20 mg by mouth daily   oxyCODONE-acetaminophen (PERCOCET) 5-325 mg per tablet   No No   Sig: Take 1 tablet by mouth every 4 (four) hours as needed for moderate pain for up to 10 daysMax Daily Amount: 6 tablets   Patient not taking: Reported on 7/2/2021      Facility-Administered Medications: None     No Known Allergies    Objective   First Vitals:   Blood Pressure: 156/70 (07/02/21 2235)  Pulse: (!) 108 (07/02/21 2235)  Temperature: 97 5 °F (36 4 °C) (07/02/21 2235)  Temp Source: Tympanic (07/02/21 2235)  Respirations: (!) 24 (07/02/21 2235)  SpO2: 97 % (07/02/21 2235)    Current Vitals:   Blood Pressure: (!) 144/102 (07/03/21 0800)  Pulse: 73 (07/03/21 0759)  Temperature: 98 5 °F (36 9 °C) (07/03/21 0800)  Temp Source: Oral (07/03/21 0317)  Respirations: 18 (07/03/21 0800)  SpO2: 97 % (07/03/21 0759)      Intake/Output Summary (Last 24 hours) at 7/3/2021 0850  Last data filed at 7/3/2021 0345  Gross per 24 hour   Intake 250 ml   Output --   Net 250 ml       Invasive Devices     Peripheral Intravenous Line            Peripheral IV 07/02/21 Left Antecubital <1 day                Physical Exam  Vitals reviewed  Constitutional:       General: He is not in acute distress  Appearance: Normal appearance  HENT:      Mouth/Throat:      Pharynx: Oropharynx is clear  Eyes:      Pupils: Pupils are equal, round, and reactive to light  Cardiovascular:      Rate and Rhythm: Normal rate and regular rhythm  Heart sounds: No murmur heard  Pulmonary:      Effort: Pulmonary effort is normal  No respiratory distress  Breath sounds: Normal breath sounds  Abdominal:      General: Abdomen is flat  There is no distension  Palpations: Abdomen is soft  Comments: Very minimal tenderness to deep palpation in the right upper quadrant   Musculoskeletal:         General: Normal range of motion  Cervical back: Normal range of motion and neck supple     Lymphadenopathy:      Cervical: No cervical adenopathy  Skin:     Capillary Refill: Capillary refill takes less than 2 seconds  Comments: No jaundice and no rash   Neurological:      General: No focal deficit present  Mental Status: He is oriented to person, place, and time  Psychiatric:      Comments: Patient is very anxious and concerned about a potential complication from surgery         Lab Results:   I have personally reviewed pertinent lab results  , CBC:   Lab Results   Component Value Date    WBC 8 51 07/02/2021    HGB 16 3 07/02/2021    HCT 51 7 (H) 07/02/2021    MCV 89 07/02/2021     07/02/2021    MCH 28 2 07/02/2021    MCHC 31 5 07/02/2021    RDW 12 8 07/02/2021    MPV 9 8 07/02/2021    NRBC 0 07/02/2021   , CMP:   Lab Results   Component Value Date    SODIUM 140 07/02/2021    K 4 0 07/02/2021     07/02/2021    CO2 29 07/02/2021    BUN 12 07/02/2021    CREATININE 1 21 07/02/2021    CALCIUM 9 2 07/02/2021     (H) 07/02/2021     (H) 07/02/2021    ALKPHOS 123 (H) 07/02/2021    EGFR 77 07/02/2021     Imaging: I have personally reviewed pertinent reports  EKG, Pathology, and Other Studies: I have personally reviewed pertinent reports  Code Status: Level 1 - Full Code  Advance Directive and Living Will:      Power of :    POLST:      Counseling / Coordination of Care  Total floor / unit time spent today 60 minutes  Greater than 50% of total time was spent with the patient and / or family counseling and / or coordination of care  A description of the counseling / coordination of care:  I discussed the case at length with the ER physician upon presentation, I reviewed his laboratory studies as well as CT scan images and CT scan report  I explained these findings at length to the patient and facilitated urgent MRCP this morning  I also facilitated admission to the general surgery service

## 2021-07-03 NOTE — DISCHARGE INSTR - AVS FIRST PAGE
1  Take benadryl as needed for the itching  2  Followup with Dr Mirian Ivan as previously arranged      3  If you have any questions, call the office at [phone]

## 2021-07-06 ENCOUNTER — TRANSITIONAL CARE MANAGEMENT (OUTPATIENT)
Dept: FAMILY MEDICINE CLINIC | Facility: CLINIC | Age: 36
End: 2021-07-06

## 2021-07-06 NOTE — UTILIZATION REVIEW
Initial Clinical Review    Admission: Date/Time/Statement:   Admission Orders (From admission, onward)     Ordered        07/03/21 0125  INPATIENT ADMISSION  Once                   Orders Placed This Encounter   Procedures    INPATIENT ADMISSION     Standing Status:   Standing     Number of Occurrences:   1     Order Specific Question:   Level of Care     Answer:   Med Surg [16]     Order Specific Question:   Estimated length of stay     Answer:   More than 2 Midnights     Order Specific Question:   Certification     Answer:   I certify that inpatient services are medically necessary for this patient for a duration of greater than two midnights  See H&P and MD Progress Notes for additional information about the patient's course of treatment  ED Arrival Information     Expected Arrival Acuity    - 7/2/2021 22:16 Less Urgent         Means of arrival Escorted by Service Admission type    Walk-In Self Surgery-General Urgent         Arrival complaint    itching, jaundice        Chief Complaint   Patient presents with    Itching     gallbladder removed a week ago, woke up with nausea 4 days ago, started with itching and progressing, driving him nuts  seen at urgent care today       Initial Presentation:     45year old male presents to ed from home for evaluation and treatment of nausea and itching  PMHX : CHOLESYSTECTOMY 2 WEEKS AGO  Clinical assessment significant forn abdominal pain, dark urine, puritis,  bilirubin 1 5  Admit to inpatient for possible retained common bile duct stone  Plan includes MRCP  Date: 7-3 -21    Day 2: inpatient     MRCP completed with normal results       ED Triage Vitals [07/02/21 2235]   97 5 °F (36 4 °C) (!) 108 (!) 24 156/70 97 %      Tympanic Monitor         Pain Score       5          07/02/21 95 7 kg (211 lb)     Additional Vital Signs:     Vitals:    07/03/21 0315 07/03/21 0317 07/03/21 0759 07/03/21 0800   BP: 148/83 148/83 (!) 144/102 (!) 144/102   Pulse:  67 73 Resp: 17 17 18 18   Temp:  98 5 °F (36 9 °C) 98 5 °F (36 9 °C) 98 5 °F (36 9 °C)   TempSrc:  Oral     SpO2:  95% 97%          Pertinent Labs/Diagnostic Test Results:     MRI abdomen wo contrast and mrcp   Final (07/03 1303)      No choledocholithiasis  MRCP is normal       Status post cholecystectomy  Trace fluid in the cholecystectomy bed, likely postsurgical          CT abdomen pelvis with contrast   Final  (07/03 0038)      Status post cholecystectomy with minimal inflammation in the surgical bed which may be due to postsurgical changes, cannot rule out postsurgical complication such as infection or leak              Results from last 7 days   Lab Units 07/02/21  2321   WBC Thousand/uL 8 51   HEMOGLOBIN g/dL 16 3   HEMATOCRIT % 51 7*   PLATELETS Thousands/uL 293   NEUTROS ABS Thousands/µL 5 43         Results from last 7 days   Lab Units 07/02/21  2321   SODIUM mmol/L 140   POTASSIUM mmol/L 4 0   CHLORIDE mmol/L 101   CO2 mmol/L 29   ANION GAP mmol/L 10   BUN mg/dL 12   CREATININE mg/dL 1 21   EGFR ml/min/1 73sq m 77   CALCIUM mg/dL 9 2   MAGNESIUM mg/dL 2 3     Results from last 7 days   Lab Units 07/02/21  2321   AST U/L 211*   ALT U/L 678*   ALK PHOS U/L 123*   TOTAL PROTEIN g/dL 8 1   ALBUMIN g/dL 3 9   TOTAL BILIRUBIN mg/dL 1 52*   BILIRUBIN DIRECT mg/dL 1 02*         Results from last 7 days   Lab Units 07/02/21  2321   GLUCOSE RANDOM mg/dL 126         Results from last 7 days   Lab Units 07/02/21  2324   CLARITY UA  Clear   COLOR UA  Yellow   SPEC GRAV UA  1 010   PH UA  6 0   GLUCOSE UA mg/dl Negative   KETONES UA mg/dl Negative   BLOOD UA  Negative   PROTEIN UA mg/dl Negative   NITRITE UA  Negative   BILIRUBIN UA  Negative   UROBILINOGEN UA E U /dl 0 2   LEUKOCYTES UA  Negative       ED Treatment:   Medication Administration from 07/02/2021 2216 to 07/03/2021 0258    None         Past Medical History:   Diagnosis    Anxiety    Anxiety and depression    Psychiatric disorder     Present on Admission:   Pruritus      Admitting Diagnosis    Itching [L29 9]  Elevated bilirubin [R17]    Age/Sex: 38 y o  male    Scheduled Medications:  No current facility-administered medications for this encounter  Continuous IV Infusions:  No current facility-administered medications for this encounter  PRN Meds:  No current facility-administered medications for this encounter  None    Network Utilization Review Department  ATTENTION: Please call with any questions or concerns to [phone] and carefully listen to the prompts so that you are directed to the right person  All voicemails are confidential   Cara Snow all requests for admission clinical reviews, approved or denied determinations and any other requests to dedicated fax number below belonging to the campus where the patient is receiving treatment   List of dedicated fax numbers for the Facilities:  [address] DENIALS (Administrative/Medical Necessity) [phone]   [address] (Maternity/NICU/Pediatrics) [phone] [address] [place] [address] [address] [place] [facility] [id number] [place] [address] [address] [facility] [facility] [phone]

## 2021-07-12 ENCOUNTER — OFFICE VISIT (OUTPATIENT)
Dept: SURGERY | Facility: CLINIC | Age: 36
End: 2021-07-12

## 2021-07-12 VITALS
DIASTOLIC BLOOD PRESSURE: 74 MMHG | WEIGHT: 215 LBS | SYSTOLIC BLOOD PRESSURE: 130 MMHG | HEART RATE: 111 BPM | TEMPERATURE: 97.5 F | HEIGHT: 72 IN | BODY MASS INDEX: 29.12 KG/M2

## 2021-07-12 DIAGNOSIS — K80.20 CALCULUS OF GALLBLADDER WITHOUT CHOLECYSTITIS WITHOUT OBSTRUCTION: Primary | ICD-10-CM

## 2021-07-12 PROCEDURE — 99024 POSTOP FOLLOW-UP VISIT: CPT | Performed by: SURGERY

## 2021-07-13 NOTE — PROGRESS NOTES
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    Patient originally felt abdominal pain and itching the first week post operaitvely  I spoke to the patient in depth the first week however patient presented to urgent care for severe itching and was told to go to hospital  In hospital imaging was normal, slightly elevated LFTs    Patient was discharged home  Today patient feels good  No complaints  No itching no abodminal pain   Objective:      /74 (BP Location: Left arm, Patient Position: Sitting, Cuff Size: Large)   Pulse (!) 111   Temp 97 5 °F (36 4 °C) (Core)   Ht 6' (1 829 m)   Wt 97 5 kg (215 lb)   BMI 29 16 kg/m²     General:  alert and oriented, in no acute distress   Abdomen: soft, bowel sounds active, non-tender   Incision:   healing well, no drainage, no erythema, no hernia, no seroma, no swelling, no dehiscence, incision well approximated       Assessment:      Doing well postoperatively  Plan:      1  Continue any current medications  2  Wound care discussed  3  Pt is to increase activities as tolerated    4  Follow up prn

## 2021-07-14 ENCOUNTER — CONSULT (OUTPATIENT)
Dept: GASTROENTEROLOGY | Facility: CLINIC | Age: 36
End: 2021-07-14
Payer: COMMERCIAL

## 2021-07-14 VITALS
DIASTOLIC BLOOD PRESSURE: 94 MMHG | SYSTOLIC BLOOD PRESSURE: 135 MMHG | HEART RATE: 81 BPM | BODY MASS INDEX: 29.39 KG/M2 | HEIGHT: 72 IN | TEMPERATURE: 97.9 F | WEIGHT: 217 LBS

## 2021-07-14 DIAGNOSIS — K21.9 GASTROESOPHAGEAL REFLUX DISEASE, UNSPECIFIED WHETHER ESOPHAGITIS PRESENT: Primary | ICD-10-CM

## 2021-07-14 DIAGNOSIS — K62.5 BRBPR (BRIGHT RED BLOOD PER RECTUM): ICD-10-CM

## 2021-07-14 PROCEDURE — 1036F TOBACCO NON-USER: CPT | Performed by: INTERNAL MEDICINE

## 2021-07-14 PROCEDURE — 99244 OFF/OP CNSLTJ NEW/EST MOD 40: CPT | Performed by: INTERNAL MEDICINE

## 2021-07-14 PROCEDURE — 3008F BODY MASS INDEX DOCD: CPT | Performed by: INTERNAL MEDICINE

## 2021-07-14 RX ORDER — SODIUM, POTASSIUM,MAG SULFATES 17.5-3.13G
2 SOLUTION, RECONSTITUTED, ORAL ORAL SEE ADMIN INSTRUCTIONS
Qty: 177 ML | Refills: 0 | Status: SHIPPED | OUTPATIENT
Start: 2021-07-14 | End: 2022-03-25

## 2021-07-14 NOTE — PROGRESS NOTES
Consultation - [address] [facility]  [place]man [place] 1985 male         Chief Complaint:    GERD    HPI:   58-year-old male with history of anxiety, depression has chronic problems with acid reflux for which he has been on omeprazole for more than 5 years  He made this appointment few weeks ago when he was also having symptoms of abdominal pain  He just had gallbladder surgery couple of weeks ago for stones and reports doing well since then  Denies any abdominal pain, nausea or vomiting  Good appetite, no recent weight loss  He has trouble swallowing the pills in general but denies any difficulty with food  Regular bowel movements and reports having couple of episodes of fresh bleeding from the rectum in the past few months  REVIEW OF SYSTEMS: Review of Systems   Constitutional: Negative for activity change, appetite change, chills, diaphoresis, fatigue, fever and unexpected weight change  HENT: Negative for ear discharge, ear pain, facial swelling, hearing loss, nosebleeds, sore throat, tinnitus and voice change  Eyes: Negative for pain, discharge, redness, itching and visual disturbance  Respiratory: Negative for apnea, cough, chest tightness, shortness of breath and wheezing  Cardiovascular: Negative for chest pain and palpitations  Gastrointestinal:        As noted in HPI   Endocrine: Negative for cold intolerance, heat intolerance and polyuria  Genitourinary: Negative for difficulty urinating, dysuria, flank pain, hematuria and urgency  Musculoskeletal: Negative for arthralgias, back pain, gait problem, joint swelling and myalgias  Skin: Negative for rash and wound  Neurological: Negative for dizziness, tremors, seizures, speech difficulty, light-headedness, numbness and headaches  Hematological: Negative for adenopathy  Does not bruise/bleed easily  Psychiatric/Behavioral: Negative for agitation, behavioral problems and confusion  The patient is not nervous/anxious  Past Medical History:   Diagnosis Date    Anxiety     Anxiety and depression     GERD (gastroesophageal reflux disease)     Psychiatric disorder       Past Surgical History:   Procedure Laterality Date    CHOLECYSTECTOMY      NO PAST SURGERIES      [place] LAP,CHOLECYSTECTOMY N/A 2021    Procedure: CHOLECYSTECTOMY LAPAROSCOPIC;  Surgeon: Jonas Gamez MD;  Location: [facility];  Service: General    WISDOM TOOTH EXTRACTION       Social History     Socioeconomic History    Marital status: Single     Spouse name: Not on file    Number of children: Not on file    Years of education: Not on file    Highest education level: Not on file   Occupational History    Not on file   Tobacco Use    Smoking status: Former Smoker     Quit date: 2016     Years since quittin 0    Smokeless tobacco: Current User     Types: Chew   Vaping Use    Vaping Use: Never used   Substance and Sexual Activity    Alcohol use: No    Drug use: No    Sexual activity: Yes     Partners: Female   Other Topics Concern    Not on file   Social History Narrative    Not on file     Social Determinants of Health     Financial Resource Strain:     Difficulty of Paying Living Expenses:    Food Insecurity:     Worried About Running Out of Food in the Last Year:     [address] in the Last Year:    Transportation Needs:     Lack of Transportation (Medical):      Lack of Transportation (Non-Medical):    Physical Activity:     Days of Exercise per Week:     Minutes of Exercise per Session:    Stress:     Feeling of Stress :    Social Connections:     Frequency of Communication with Friends and Family:     Frequency of Social Gatherings with Friends and Family:     Attends [employer] Services:     Active Member of Clubs or Organizations:     Attends Club or Organization Meetings:     Marital Status:    Intimate Partner Violence:     Fear of Current or Ex-Partner:     Emotionally Abused:     Physically Abused:  Sexually Abused:      Family History   Problem Relation Age of Onset    Anxiety disorder Father     Hypertension Father      Patient has no known allergies  Current Outpatient Medications   Medication Sig Dispense Refill    omeprazole (PriLOSEC) 20 mg delayed release capsule Take 20 mg by mouth daily      citalopram (CeleXA) 10 mg tablet Take 1 tablet (10 mg total) by mouth daily (Patient not taking: Reported on 7/14/2021) 30 tablet 2    diphenhydrAMINE (BENADRYL) 25 mg tablet Take 1 tablet (25 mg total) by mouth every 6 (six) hours as needed for itching for up to 10 doses (Patient not taking: Reported on 7/12/2021) 10 tablet 0    famotidine (PEPCID) 20 mg tablet Take 1 tablet (20 mg total) by mouth 2 (two) times a day (Patient not taking: Reported on 7/2/2021) 60 tablet 0    ibuprofen (MOTRIN) 600 mg tablet Take 1 tablet (600 mg total) by mouth every 6 (six) hours as needed for mild pain (Patient not taking: Reported on 4/1/2020) 20 tablet 0    Na Sulfate-K Sulfate-Mg Sulf (Suprep Bowel Prep Kit) 17 5-3 13-1 6 GM/177ML SOLN Take 2 Bottles by mouth see administration instructions Please follow the instructions from the office 177 mL 0    omeprazole (PriLOSEC) 20 mg delayed release capsule Take 20 mg by mouth daily (Patient not taking: Reported on 7/2/2021)       No current facility-administered medications for this visit  Blood pressure 135/94, pulse 81, temperature 97 9 °F (36 6 °C), height 6' (1 829 m), weight 98 4 kg (217 lb)  PHYSICAL EXAM: Physical Exam  Constitutional:       Appearance: He is well-developed  HENT:      Head: Normocephalic and atraumatic  Eyes:      General: No scleral icterus  Right eye: No discharge  Left eye: No discharge  Conjunctiva/sclera: Conjunctivae normal       Pupils: Pupils are equal, round, and reactive to light  Neck:      Thyroid: No thyromegaly  Vascular: No JVD  Trachea: No tracheal deviation     Cardiovascular: Rate and Rhythm: Normal rate and regular rhythm  Heart sounds: Normal heart sounds  No murmur heard  No friction rub  No gallop  Pulmonary:      Effort: Pulmonary effort is normal  No respiratory distress  Breath sounds: Normal breath sounds  No wheezing or rales  Chest:      Chest wall: No tenderness  Abdominal:      General: Bowel sounds are normal  There is no distension  Palpations: Abdomen is soft  There is no mass  Tenderness: There is no abdominal tenderness  There is no guarding or rebound  Hernia: No hernia is present  Musculoskeletal:      Cervical back: Neck supple  Lymphadenopathy:      Cervical: No cervical adenopathy  Skin:     General: Skin is warm and dry  Findings: No erythema or rash  Neurological:      Mental Status: He is alert and oriented to person, place, and time  Psychiatric:         Behavior: Behavior normal          Thought Content: Thought content normal           Lab Results   Component Value Date    WBC 8 51 07/02/2021    HGB 16 3 07/02/2021    HCT 51 7 (H) 07/02/2021    MCV 89 07/02/2021     07/02/2021     Lab Results   Component Value Date    CALCIUM 9 2 07/02/2021    K 4 0 07/02/2021    CO2 29 07/02/2021     07/02/2021    BUN 12 07/02/2021    CREATININE 1 21 07/02/2021     Lab Results   Component Value Date     (H) 07/02/2021     (H) 07/02/2021    ALKPHOS 123 (H) 07/02/2021     No results found for: INR, PROTIME    MRI abdomen wo contrast and mrcp    Result Date: 7/3/2021  Impression: No choledocholithiasis  MRCP is normal  Status post cholecystectomy  Trace fluid in the cholecystectomy bed, likely postsurgical  Workstation performed: [id number]     CT abdomen pelvis with contrast    Result Date: 7/3/2021  Impression: Status post cholecystectomy with minimal inflammation in the surgical bed which may be due to postsurgical changes, cannot rule out postsurgical complication such as infection or leak   The study was marked in [facility] for immediate notification  Workstation performed: [id number]       ASSESSMENT & PLAN:    Gastroesophageal reflux disease    Gastroesophageal reflux disease - Patient has the symptoms of chronic acid reflux for a long time  Possible hiatal hernia or LES weakness  Should rule out Elizondo's esophagus because of chronic symptoms         -Patient was explained about the lifestyle and dietary modifications  Advised to avoid fatty foods, chocolates, caffeine, alcohol and any other triggering foods  Avoid eating for at least 3 hours before going to bed  -  Discussed about the long-term side effects from omeprazole and advised him to try Pepcid instead  -  Schedule for EGD    BRBPR (bright red blood per rectum)   Appear to be from hemorrhoids  Rule out colorectal lesions including polyps or malignancy       -Schedule for colonoscopy  -High-fiber diet     -Patient was given instructions about the colonoscopy prep     -Patient was explained about  the risks and benefits of the procedure  Risks including but not limited to bleeding, infection, perforation were explained in detail  Also explained about less than 100% sensitivity with the exam and other alternatives

## 2021-07-14 NOTE — ASSESSMENT & PLAN NOTE
Gastroesophageal reflux disease - Patient has the symptoms of chronic acid reflux for a long time  Possible hiatal hernia or LES weakness  Should rule out Elizondo's esophagus because of chronic symptoms         -Patient was explained about the lifestyle and dietary modifications  Advised to avoid fatty foods, chocolates, caffeine, alcohol and any other triggering foods  Avoid eating for at least 3 hours before going to bed  -  Discussed about the long-term side effects from omeprazole and advised him to try Pepcid instead      -  Schedule for EGD

## 2021-07-14 NOTE — ASSESSMENT & PLAN NOTE
Appear to be from hemorrhoids  Rule out colorectal lesions including polyps or malignancy       -Schedule for colonoscopy  -High-fiber diet     -Patient was given instructions about the colonoscopy prep     -Patient was explained about  the risks and benefits of the procedure  Risks including but not limited to bleeding, infection, perforation were explained in detail  Also explained about less than 100% sensitivity with the exam and other alternatives

## 2021-09-14 ENCOUNTER — TELEPHONE (OUTPATIENT)
Dept: PREADMISSION TESTING | Facility: HOSPITAL | Age: 36
End: 2021-09-14

## 2021-09-17 ENCOUNTER — TELEPHONE (OUTPATIENT)
Dept: GASTROENTEROLOGY | Facility: MEDICAL CENTER | Age: 36
End: 2021-09-17

## 2021-09-17 ENCOUNTER — TELEPHONE (OUTPATIENT)
Dept: GASTROENTEROLOGY | Facility: AMBULARY SURGERY CENTER | Age: 36
End: 2021-09-17

## 2021-09-17 NOTE — TELEPHONE ENCOUNTER
[address] [facility]    Patient called and stated that his lost all his paperwork and also forgot to get his covid test done  He would like to reschedule his procedure  Please contact patient @ [phone]  Thank you

## 2021-09-17 NOTE — TELEPHONE ENCOUNTER
Pt confirmed he received emailed bowel prep instructions (dulcolax/miralax) pt is scheduled 9/22/2021 w/ dr Thi Vuong at [facility]/pt is aware to have COVID test done 6 days prior to procoedure

## 2021-09-22 ENCOUNTER — PREP FOR PROCEDURE (OUTPATIENT)
Dept: GASTROENTEROLOGY | Facility: MEDICAL CENTER | Age: 36
End: 2021-09-22

## 2021-09-22 ENCOUNTER — TELEPHONE (OUTPATIENT)
Dept: GASTROENTEROLOGY | Facility: MEDICAL CENTER | Age: 36
End: 2021-09-22

## 2021-09-22 DIAGNOSIS — Z20.822 ENCOUNTER FOR LABORATORY TESTING FOR COVID-19 VIRUS: ICD-10-CM

## 2021-09-22 DIAGNOSIS — K21.9 GASTROESOPHAGEAL REFLUX DISEASE, UNSPECIFIED WHETHER ESOPHAGITIS PRESENT: Primary | ICD-10-CM

## 2021-09-22 NOTE — TELEPHONE ENCOUNTER
Patient called and stated he does not understand why his procedure was canceled  I spoke to him on 9/17/21 and he stated he would like to reschedule his procedure to him not finding his paperwork and not having his covid test done yet  He stated he currently does not want to do a colonoscopy because "he was not aware that his procedure got canceled and did all the prep"  And he thinks the colonoscopy is only for precaution and he thinks it is currently not necessary  Rescheduled EGD with Dr Frankey Ina on 10/4/21 at [facility]  The patient is scheduled at [facility] a EGD with Dr Frankey Ina on 10/4/2021  EGD prep instructions have been gone over in with the patient  The patient is aware that they will receive a call with the arrival time the day prior to procedure and that they will need a  the day of the procedure  I have asked the patient to call with any questions that they might have prior to procedure  Patient verbalized understanding  Mailed out updated paperwork to patient

## 2021-11-28 ENCOUNTER — NURSE TRIAGE (OUTPATIENT)
Dept: OTHER | Facility: OTHER | Age: 36
End: 2021-11-28

## 2021-11-28 DIAGNOSIS — B34.9 VIRAL INFECTION, UNSPECIFIED: Primary | ICD-10-CM

## 2021-11-28 PROCEDURE — U0005 INFEC AGEN DETEC AMPLI PROBE: HCPCS | Performed by: STUDENT IN AN ORGANIZED HEALTH CARE EDUCATION/TRAINING PROGRAM

## 2021-11-28 PROCEDURE — U0003 INFECTIOUS AGENT DETECTION BY NUCLEIC ACID (DNA OR RNA); SEVERE ACUTE RESPIRATORY SYNDROME CORONAVIRUS 2 (SARS-COV-2) (CORONAVIRUS DISEASE [COVID-19]), AMPLIFIED PROBE TECHNIQUE, MAKING USE OF HIGH THROUGHPUT TECHNOLOGIES AS DESCRIBED BY CMS-2020-01-R: HCPCS | Performed by: STUDENT IN AN ORGANIZED HEALTH CARE EDUCATION/TRAINING PROGRAM

## 2021-12-25 NOTE — PROGRESS NOTES
Assessment/Plan:     Diagnoses and all orders for this visit:    Periodontitis  Comments:  Patient has been advised to call dental office to schedule an appointment  Patient has acknowledged understanding of implications of not seeking dental care  Orders:  -     amoxicillin-clavulanate (AUGMENTIN) 875-125 mg per tablet; Take 1 tablet by mouth every 12 (twelve) hours for 7 days    Dental caries noted on examination  Comments:  Extensive dental caries  See plan for Periodontitis    Insurance coverage problems  -     Ambulatory referral to social work care management program; Future          Subjective:      Patient ID: Gabriella Morales is a 27 y o  male  79-year-old male with past medical history of multiple dental caries/tooth decay/tooth infections, anxiety disorder  Presents today complaining of dental pain x1 day, which initially rated at 10/10, nonradiating, aggravated by chewing and relieved by ibuprofen  Patient has had multiple incidents of tooth infection in the past, has not been able to see a dentist because of the cost and having no insurance  Patient at this time denies any discharge, bleeding gum or fever  Patient was impressed upon the need for urgent dental checkup and treatment  Patient acknowledged understanding about the implications of not seeking proper dental care  Patient at this time denies any chest pain, SOB, cough  The following portions of the patient's history were reviewed and updated as appropriate:   He  has a past medical history of Anxiety and depression and Psychiatric disorder  He   Patient Active Problem List    Diagnosis Date Noted    Muscle spasm 03/06/2019    Cold 12/11/2018    Tooth infection 10/02/2018    Viral syndrome 05/09/2018    Anxiety 04/27/2018    Elevated blood pressure, situational 04/27/2018     He  has a past surgical history that includes No past surgeries    His family history includes Anxiety disorder in his father; Hypertension in his father  He  reports that he has quit smoking  His smokeless tobacco use includes chew  He reports that he does not drink alcohol or use drugs  Current Outpatient Medications   Medication Sig Dispense Refill    amoxicillin-clavulanate (AUGMENTIN) 875-125 mg per tablet Take 1 tablet by mouth every 12 (twelve) hours for 7 days 14 tablet 0    citalopram (CeleXA) 20 mg tablet Take 1 tablet (20 mg total) by mouth daily 60 tablet 1    cyclobenzaprine (FLEXERIL) 10 mg tablet Take 1 tablet (10 mg total) by mouth 3 (three) times a day as needed for muscle spasms for up to 5 days 15 tablet 0    ibuprofen (MOTRIN) 600 mg tablet Take 1 tablet (600 mg total) by mouth every 6 (six) hours as needed for mild pain 20 tablet 0    omeprazole (PriLOSEC) 20 mg delayed release capsule Take 20 mg by mouth daily       No current facility-administered medications for this visit  Current Outpatient Medications on File Prior to Visit   Medication Sig    citalopram (CeleXA) 20 mg tablet Take 1 tablet (20 mg total) by mouth daily    cyclobenzaprine (FLEXERIL) 10 mg tablet Take 1 tablet (10 mg total) by mouth 3 (three) times a day as needed for muscle spasms for up to 5 days    ibuprofen (MOTRIN) 600 mg tablet Take 1 tablet (600 mg total) by mouth every 6 (six) hours as needed for mild pain    omeprazole (PriLOSEC) 20 mg delayed release capsule Take 20 mg by mouth daily     No current facility-administered medications on file prior to visit  He has No Known Allergies       Review of Systems   Constitutional: Negative  HENT: Positive for dental problem  Negative for congestion, ear discharge, ear pain, nosebleeds and postnasal drip  Eyes: Negative  Respiratory: Negative  Cardiovascular: Negative  Gastrointestinal: Negative  Genitourinary: Negative            Objective:      /72   Pulse (!) 108   Temp 98 3 °F (36 8 °C)   Resp 20   Wt 100 kg (221 lb)   SpO2 98%   BMI 29 97 kg/m²          Physical Exam Constitutional: He appears well-developed and well-nourished  No distress  HENT:   Head: Normocephalic and atraumatic  Right Ear: External ear normal    Left Ear: External ear normal    Nose: Nose normal    Mouth/Throat: Oropharynx is clear and moist  No oropharyngeal exudate  Very poor dental hygiene   Eyes: Pupils are equal, round, and reactive to light  Conjunctivae are normal    Neck: Normal range of motion  Neck supple  Cardiovascular: Normal rate, regular rhythm, normal heart sounds and intact distal pulses  Exam reveals no gallop and no friction rub  No murmur heard  Pulmonary/Chest: Effort normal and breath sounds normal  No stridor  No respiratory distress  He has no wheezes  He has no rales  Lymphadenopathy:     He has no cervical adenopathy  Skin: He is not diaphoretic  Nursing note and vitals reviewed  Airborne+Contact precautions

## 2022-01-12 DIAGNOSIS — F41.9 ANXIETY: ICD-10-CM

## 2022-01-13 RX ORDER — CITALOPRAM 10 MG/1
10 TABLET ORAL DAILY
Qty: 30 TABLET | Refills: 1 | Status: SHIPPED | OUTPATIENT
Start: 2022-01-13 | End: 2022-03-25 | Stop reason: SDUPTHER

## 2022-03-03 ENCOUNTER — OFFICE VISIT (OUTPATIENT)
Dept: FAMILY MEDICINE CLINIC | Facility: CLINIC | Age: 37
End: 2022-03-03
Payer: COMMERCIAL

## 2022-03-03 VITALS
RESPIRATION RATE: 18 BRPM | TEMPERATURE: 97.5 F | OXYGEN SATURATION: 98 % | DIASTOLIC BLOOD PRESSURE: 90 MMHG | HEART RATE: 89 BPM | BODY MASS INDEX: 32.37 KG/M2 | SYSTOLIC BLOOD PRESSURE: 140 MMHG | HEIGHT: 72 IN | WEIGHT: 239 LBS

## 2022-03-03 DIAGNOSIS — R03.0 ELEVATED BP WITHOUT DIAGNOSIS OF HYPERTENSION: ICD-10-CM

## 2022-03-03 DIAGNOSIS — Z11.4 SCREENING FOR HIV (HUMAN IMMUNODEFICIENCY VIRUS): ICD-10-CM

## 2022-03-03 DIAGNOSIS — G56.02 CARPAL TUNNEL SYNDROME OF LEFT WRIST: Primary | ICD-10-CM

## 2022-03-03 DIAGNOSIS — Z11.59 NEED FOR HEPATITIS C SCREENING TEST: ICD-10-CM

## 2022-03-03 PROCEDURE — 99213 OFFICE O/P EST LOW 20 MIN: CPT | Performed by: FAMILY MEDICINE

## 2022-03-03 NOTE — PROGRESS NOTES
Assessment/Plan:     Diagnoses and all orders for this visit:    Patient is a 65-year-old male who presents with left-sided wrist pain since Monday  Pain has been on off for several years he reports an exacerbated regionally after he worked as a  and was leaning on his hands and knees for extended period of time  Negative Phalen test however very positive to Tinel's test today with numbness and tingling in his left 1st, 2nd, 3rd, and half of 4th digit  Recommended to use a wrist brace at night while sleeping try rest wrist as well as continue anti-inflammatories OTC  Given referral to Orthopedics for possible carpal tunnel injections if more conservative measures fail  Carpal tunnel syndrome of left wrist  -     Ambulatory Referral to Orthopedic Surgery; Future    Need for hepatitis C screening test  -     Hepatitis C Antibody ([facility]); Future  -     Hepatitis C Antibody ([facility])    Screening for HIV (human immunodeficiency virus)  -     HIV 1/2 Antigen/Antibody (4th Generation) w Reflex [facility]; Future    Elevated BP without diagnosis of hypertension       Elevated BP without diagnosis of HTN  -Follow up at next visit  Patient reports this is white coat syndrome    -Patietn advised to f/u for annual physical      Subjective:      Patient ID: Shen Duncan is a 39 y o  male  HPI  Patient is a 65-year-old male who presents with left-sided wrist pain since Monday  Pain has been on off for several years he reports an exacerbated regionally after he worked as a  and was leaning on his hands and knees for extended period of time  Reports positive for numbness and tingling in 1st 3-1/2 digits of his left hand  He has tried to use his mom's wrist brace since Monday  Negative for other joint pains and recent tick bites  Negative for fevers or chills  No inflammation or redness of joint per patient      The following portions of the patient's history were reviewed and updated as appropriate: allergies, current medications, past medical history, past social history and problem list     Review of Systems    Per HPI    Objective:      /90 (BP Location: Right arm, Patient Position: Sitting, Cuff Size: Large)   Pulse 89   Temp 97 5 °F (36 4 °C) (Tympanic)   Resp 18   Ht 6' (1 829 m)   Wt 108 kg (239 lb)   SpO2 98%   BMI 32 41 kg/m²          Physical Exam  Constitutional:       Appearance: He is obese  Cardiovascular:      Rate and Rhythm: Normal rate and regular rhythm  Pulses: Normal pulses  Heart sounds: Normal heart sounds  Pulmonary:      Effort: Pulmonary effort is normal       Breath sounds: Normal breath sounds  Skin:     General: Skin is warm  Capillary Refill: Capillary refill takes less than 2 seconds  Neurological:      General: No focal deficit present  Mental Status: He is alert and oriented to person, place, and time  Sensory: No sensory deficit  Motor: No weakness  Comments: Left wrist pathognomonic positive Tinel sign with numbness and tingling in 1st, 2nd, 3rd, and half of 4th finger  No thenar eminence atrophy     Psychiatric:         Mood and Affect: Mood normal          Behavior: Behavior normal

## 2022-03-08 ENCOUNTER — TELEMEDICINE (OUTPATIENT)
Dept: FAMILY MEDICINE CLINIC | Facility: CLINIC | Age: 37
End: 2022-03-08
Payer: COMMERCIAL

## 2022-03-08 DIAGNOSIS — Z20.822 SUSPECTED COVID-19 VIRUS INFECTION: Primary | ICD-10-CM

## 2022-03-08 PROCEDURE — U0005 INFEC AGEN DETEC AMPLI PROBE: HCPCS | Performed by: STUDENT IN AN ORGANIZED HEALTH CARE EDUCATION/TRAINING PROGRAM

## 2022-03-08 PROCEDURE — 99213 OFFICE O/P EST LOW 20 MIN: CPT | Performed by: FAMILY MEDICINE

## 2022-03-08 PROCEDURE — U0003 INFECTIOUS AGENT DETECTION BY NUCLEIC ACID (DNA OR RNA); SEVERE ACUTE RESPIRATORY SYNDROME CORONAVIRUS 2 (SARS-COV-2) (CORONAVIRUS DISEASE [COVID-19]), AMPLIFIED PROBE TECHNIQUE, MAKING USE OF HIGH THROUGHPUT TECHNOLOGIES AS DESCRIBED BY CMS-2020-01-R: HCPCS | Performed by: STUDENT IN AN ORGANIZED HEALTH CARE EDUCATION/TRAINING PROGRAM

## 2022-03-08 NOTE — PROGRESS NOTES
Virtual Regular Visit    Verification of patient location:    Patient is located in the following state in which I hold an active license {[facility] virtual patient location:[address]}      Assessment/Plan:    Problem List Items Addressed This Visit     None               Reason for visit is   Chief Complaint   Patient presents with    Virtual Regular Visit        Encounter provider Eddie Akins MD    Provider located at [address] [place] [phone]      Recent Visits  Date Type Provider Dept   03/03/22 Office Visit Real MD Anam [facility]   Showing recent visits within past 7 days and meeting all other requirements  Today's Visits  Date Type Provider Dept   03/08/22 Telemedicine Eddie Akins MD [facility]   Showing today's visits and meeting all other requirements  Future Appointments  No visits were found meeting these conditions  Showing future appointments within next 150 days and meeting all other requirements       The patient was identified by name and date of birth  Candy Stapleton was informed that this is a telemedicine visit and that the visit is being conducted through {AMB VIRTUAL VISIT [facility]:87412}  {Telemedicine confidentiality :41897} {Telemedicine participants:15617}  He acknowledged consent and understanding of privacy and security of the video platform  The patient has agreed to participate and understands they can discontinue the visit at any time  Patient is aware this is a billable service  Subjective  Candy Stapleton is a 39 y o  male ***         HPI     Past Medical History:   Diagnosis Date    Anxiety     Anxiety and depression     GERD (gastroesophageal reflux disease)     Psychiatric disorder        Past Surgical History:   Procedure Laterality Date    CHOLECYSTECTOMY  2021    [place] LAP,CHOLECYSTECTOMY N/A 6/25/2021    Procedure: CHOLECYSTECTOMY LAPAROSCOPIC;  Surgeon: Aminah Shaver MD; Location: [facility];  Service: General    WISDOM TOOTH EXTRACTION         Current Outpatient Medications   Medication Sig Dispense Refill    citalopram (CeleXA) 10 mg tablet Take 1 tablet (10 mg total) by mouth daily 30 tablet 1    ibuprofen (MOTRIN) 600 mg tablet Take 1 tablet (600 mg total) by mouth every 6 (six) hours as needed for mild pain 20 tablet 0    Na Sulfate-K Sulfate-Mg Sulf (Suprep Bowel Prep Kit) 17 5-3 13-1 6 GM/177ML SOLN Take 2 Bottles by mouth see administration instructions Please follow the instructions from the office (Patient not taking: Reported on 3/3/2022 ) 177 mL 0    omeprazole (PriLOSEC) 20 mg delayed release capsule Take 20 mg by mouth daily        No current facility-administered medications for this visit  No Known Allergies    Review of Systems    Video Exam    There were no vitals filed for this visit  Physical Exam     {Time Spent:27752}    VIRTUAL VISIT DISCLAIMER      Ruchi Lawsons verbally agrees to participate in [facility]  Pt is aware that [facility] could be limited without vital signs or the ability to perform a full hands-on physical Rajinder Co understands he or the provider may request at any time to terminate the video visit and request the patient to seek care or treatment in person

## 2022-03-08 NOTE — PROGRESS NOTES
COVID-19 Outpatient Progress Note    Assessment/Plan:    Problem List Items Addressed This Visit     None         Disposition:     Recommended patient to come to the office to test for COVID-19  Work requires COVID testing before he can return to work    I have spent 15 minutes directly with the patient  Encounter provider Jose Armando Miner MD    Provider located at [address] [place] [phone]    Recent Visits  Date Type Provider Dept   03/03/22 Office Visit Richard Jose MD [facility]   Showing recent visits within past 7 days and meeting all other requirements  Today's Visits  Date Type Provider Dept   03/08/22 Telemedicine Jose Armando Miner MD [facility]   Showing today's visits and meeting all other requirements  Future Appointments  No visits were found meeting these conditions  Showing future appointments within next 150 days and meeting all other requirements     This virtual check-in was done via [employer] ([facility] wouldn't send invite, said cell phone number was a landline) and patient was informed that this is not a secure, HIPAA-compliant platform  He agrees to proceed  Patient agrees to participate in a virtual check in via telephone or video visit instead of presenting to the office to address urgent/immediate medical needs  Patient is aware this is a billable service  After connecting through [facility], the patient was identified by name and date of birth  Shen Duncan was informed that this was a telemedicine visit and that the exam was being conducted confidentially over secure lines  My office door was closed  No one else was in the room  Shen Duncan acknowledged consent and understanding of privacy and security of the telemedicine visit  I informed the patient that I have reviewed his record in Epic and presented the opportunity for him to ask any questions regarding the visit today   The patient agreed to participate  Verification of patient location:  Patient is located in the following state in which I hold an active license: [place]    Subjective:   Jeanne Clifton is a 39 y o  male who is concerned about COVID-19  Patient's symptoms include chills, fatigue, malaise, abdominal pain, nausea, vomiting, diarrhea and headache  Patient denies fever, congestion, rhinorrhea, sore throat, anosmia, loss of taste, cough, shortness of breath, chest tightness and myalgias  - Date of symptom onset: 3/6/2022      COVID-19 vaccination status: Not vaccinated    Symptoms starting 2 days ago, symptoms worse yesterday  Significantly improved today  Last BM last night  Last vomited last night  Lab Results   Component Value Date    SARSCOV2 Negative 11/28/2021    SARSCOV2 Negative 06/19/2021    SARSCOV2 Negative 02/14/2021     Past Medical History:   Diagnosis Date    Anxiety     Anxiety and depression     GERD (gastroesophageal reflux disease)     Psychiatric disorder      Past Surgical History:   Procedure Laterality Date    CHOLECYSTECTOMY  2021    [place] LAP,CHOLECYSTECTOMY N/A 6/25/2021    Procedure: CHOLECYSTECTOMY LAPAROSCOPIC;  Surgeon: Magdalene Romberg, MD;  Location: [address] [place] [address];  Service: General    WISDOM TOOTH EXTRACTION       Current Outpatient Medications   Medication Sig Dispense Refill    citalopram (CeleXA) 10 mg tablet Take 1 tablet (10 mg total) by mouth daily 30 tablet 1    ibuprofen (MOTRIN) 600 mg tablet Take 1 tablet (600 mg total) by mouth every 6 (six) hours as needed for mild pain 20 tablet 0    Na Sulfate-K Sulfate-Mg Sulf (Suprep Bowel Prep Kit) 17 5-3 13-1 6 GM/177ML SOLN Take 2 Bottles by mouth see administration instructions Please follow the instructions from the office (Patient not taking: Reported on 3/3/2022 ) 177 mL 0    omeprazole (PriLOSEC) 20 mg delayed release capsule Take 20 mg by mouth daily        No current facility-administered medications for this visit       No Known Allergies    Review of Systems   Constitutional: Positive for chills and fatigue  Negative for fever  HENT: Negative for congestion, rhinorrhea and sore throat  Respiratory: Negative for cough, chest tightness and shortness of breath  Gastrointestinal: Positive for abdominal pain, diarrhea, nausea and vomiting  Musculoskeletal: Negative for myalgias  Neurological: Positive for headaches  Objective: There were no vitals filed for this visit  Physical Exam  Constitutional:       Appearance: Normal appearance  Cardiovascular:      Rate and Rhythm: Normal rate and regular rhythm  Neurological:      Mental Status: He is alert  VIRTUAL VISIT DISCLAIMER    Janae Nevarez verbally agrees to participate in [employer]  Pt is aware that [employer] could be limited without vital signs or the ability to perform a full hands-on physical Edman Elders understands he or the provider may request at any time to terminate the video visit and request the patient to seek care or treatment in person

## 2022-03-08 NOTE — LETTER
[address] [place] [address] Mauri Bryan [facility] [id number]  Dept: [phone]    March 8, 2022    Patient: Hortencia Corey  YOB: 1985    Hortencia Corey was seen and evaluated at our [facility]  Please note if Covid and Flu tests are negative, they may return to work when fever free for 24 hours without the use of a fever reducing agent  If Covid or Flu test is positive, they may return to work on 3/11/22, as this is 5 days from the onset of symptoms  Upon return, they must then adhere to strict masking for an additional 5 days      Sincerely,     La Cameron MD

## 2022-03-10 ENCOUNTER — TELEPHONE (OUTPATIENT)
Dept: FAMILY MEDICINE CLINIC | Facility: CLINIC | Age: 37
End: 2022-03-10

## 2022-03-10 NOTE — TELEPHONE ENCOUNTER
Dr Marielos Burt,    Patient is calling because he had a virtual with you this week for a stomach bug  Patient still not feeling well and would like a note stating that he can return to work on Monday 3/14

## 2022-03-21 ENCOUNTER — OFFICE VISIT (OUTPATIENT)
Dept: OBGYN CLINIC | Facility: CLINIC | Age: 37
End: 2022-03-21
Payer: COMMERCIAL

## 2022-03-21 ENCOUNTER — APPOINTMENT (OUTPATIENT)
Dept: RADIOLOGY | Facility: CLINIC | Age: 37
End: 2022-03-21
Payer: COMMERCIAL

## 2022-03-21 VITALS
DIASTOLIC BLOOD PRESSURE: 90 MMHG | HEART RATE: 100 BPM | WEIGHT: 232 LBS | BODY MASS INDEX: 31.42 KG/M2 | SYSTOLIC BLOOD PRESSURE: 140 MMHG | HEIGHT: 72 IN

## 2022-03-21 DIAGNOSIS — M79.642 BILATERAL HAND PAIN: ICD-10-CM

## 2022-03-21 DIAGNOSIS — G56.02 CARPAL TUNNEL SYNDROME OF LEFT WRIST: ICD-10-CM

## 2022-03-21 DIAGNOSIS — M25.532 PAIN IN LEFT WRIST: ICD-10-CM

## 2022-03-21 DIAGNOSIS — M79.641 BILATERAL HAND PAIN: ICD-10-CM

## 2022-03-21 DIAGNOSIS — G56.03 CARPAL TUNNEL SYNDROME, BILATERAL: Primary | ICD-10-CM

## 2022-03-21 DIAGNOSIS — R22.32 MASS OF LEFT WRIST: ICD-10-CM

## 2022-03-21 PROCEDURE — 99214 OFFICE O/P EST MOD 30 MIN: CPT | Performed by: ORTHOPAEDIC SURGERY

## 2022-03-21 PROCEDURE — 20526 THER INJECTION CARP TUNNEL: CPT | Performed by: ORTHOPAEDIC SURGERY

## 2022-03-21 PROCEDURE — 3008F BODY MASS INDEX DOCD: CPT | Performed by: FAMILY MEDICINE

## 2022-03-21 PROCEDURE — 73130 X-RAY EXAM OF HAND: CPT

## 2022-03-21 RX ORDER — LIDOCAINE HYDROCHLORIDE 10 MG/ML
0.5 INJECTION, SOLUTION INFILTRATION; PERINEURAL
Status: COMPLETED | OUTPATIENT
Start: 2022-03-21 | End: 2022-03-21

## 2022-03-21 RX ORDER — TRIAMCINOLONE ACETONIDE 40 MG/ML
20 INJECTION, SUSPENSION INTRA-ARTICULAR; INTRAMUSCULAR
Status: COMPLETED | OUTPATIENT
Start: 2022-03-21 | End: 2022-03-21

## 2022-03-21 RX ADMIN — LIDOCAINE HYDROCHLORIDE 0.5 ML: 10 INJECTION, SOLUTION INFILTRATION; PERINEURAL at 17:17

## 2022-03-21 RX ADMIN — TRIAMCINOLONE ACETONIDE 20 MG: 40 INJECTION, SUSPENSION INTRA-ARTICULAR; INTRAMUSCULAR at 17:17

## 2022-03-21 NOTE — PROGRESS NOTES
Assessment/Plan:  1  Carpal tunnel syndrome, bilateral  Hand/upper extremity injection: bilateral carpal tunnel   2  Carpal tunnel syndrome of left wrist  Ambulatory Referral to Orthopedic Surgery    XR hand 3+ vw left   3  Bilateral hand pain  XR hand 3+ vw right   4  Pain in left wrist  US extremity soft tissue   5  Mass of left wrist  US extremity soft tissue       Scribe Attestation    I,:  Tonie Mccoy MA am acting as a scribe while in the presence of the attending physician :       I,:  Joey Martin DO personally performed the services described in this documentation    as scribed in my presence  :             14-year-old male with bilateral carpal tunnel syndrome  He does have a positive tinel's bilaterally  Treatment options were discussed in the form of bracing and steroid injections  Patient was agreeable to this  He consented and underwent bilateral carpal tunnel injections in the office today without any complications  Post injection instructions were provided  He was advised to continue with night time bracing  In regards to his dorsal left wrist pain, I discussed with him that he likely has a small ganglion cyst  There are no palpable masses or cysts on exam  We did discuss ordering a US to better evaluate for a mass  Patient was agreeable to this and a order was placed for this today  We did discuss if this does confirm a cyst we can consider a steroid injection to decompress the cyst  He will follow up once the US is complete to discuss the results  Subjective:   Aisha Butts is a 39 y o  male who presents to the office today as a referral from his PCP for evaluation of bilateral hand pain  He states his left is worse than his right  He notes pain to the dorsal aspect of his left wrist  He notes increased pain with opening a door and in the push-up position  He states this has been ongoing for years and has been progressively getting worse over the past month   Patient also notes numbness and tingling to his bilateral thumb, index, and long fingers  He has been using an OTC wrist brace at night which does provide him with relief  He does take Ibuprofen as needed for pain  He denies any known injury or trauma  Review of Systems   Constitutional: Negative for chills and fever  HENT: Negative for drooling and sneezing  Eyes: Negative for redness  Respiratory: Negative for cough and wheezing  Gastrointestinal: Negative for nausea and vomiting  Musculoskeletal: Negative for arthralgias, joint swelling and myalgias  Neurological: Negative for weakness and numbness  Psychiatric/Behavioral: Negative for behavioral problems  The patient is not nervous/anxious            Past Medical History:   Diagnosis Date    Anxiety     Anxiety and depression     GERD (gastroesophageal reflux disease)     Psychiatric disorder        Past Surgical History:   Procedure Laterality Date    CHOLECYSTECTOMY      [facility] LAP,CHOLECYSTECTOMY N/A 2021    Procedure: CHOLECYSTECTOMY LAPAROSCOPIC;  Surgeon: Jaron Parikh MD;  Location: [facility];  Service: General    WISDOM TOOTH EXTRACTION         Family History   Problem Relation Age of Onset    Anxiety disorder Father     Hypertension Father        Social History     Occupational History    Not on file   Tobacco Use    Smoking status: Former Smoker     Years: 10      Quit date: 2016     Years since quittin 7    Smokeless tobacco: Current User     Types: Chew   Vaping Use    Vaping Use: Never used   Substance and Sexual Activity    Alcohol use: No    Drug use: No    Sexual activity: Yes     Partners: Female         Current Outpatient Medications:     citalopram (CeleXA) 10 mg tablet, Take 1 tablet (10 mg total) by mouth daily, Disp: 30 tablet, Rfl: 1    ibuprofen (MOTRIN) 600 mg tablet, Take 1 tablet (600 mg total) by mouth every 6 (six) hours as needed for mild pain, Disp: 20 tablet, Rfl: 0    Na Sulfate-K Sulfate-Mg Sulf (Suprep Bowel Prep Kit) 17 5-3 13-1 6 GM/177ML SOLN, Take 2 Bottles by mouth see administration instructions Please follow the instructions from the office (Patient not taking: Reported on 3/3/2022 ), Disp: 177 mL, Rfl: 0    omeprazole (PriLOSEC) 20 mg delayed release capsule, Take 20 mg by mouth daily , Disp: , Rfl:     No Known Allergies    Objective:  Vitals:    03/21/22 1638   BP: 140/90   Pulse: 100       Ortho Exam     Left wrist    Minimal tenderness over dorsal aspect of wrist  No palpable cyst or masses  TTP thumb CMC  NTTP 1st dorsal compartment  - grind  + tinel's  + durkan's  Compartments soft  Brisk capillary refill  S/m intact median, radial, and ulnar nerve     Right wrist    + tinel's  - durkan's  NTTP thumb CMC  NTTP dorsal aspect of wrist   Compartments soft  Brisk capillary refill  S/m intact median, radial, and ulnar nerve     Physical Exam  Constitutional:       Appearance: He is well-developed  HENT:      Head: Normocephalic and atraumatic  Eyes:      General:         Right eye: No discharge  Left eye: No discharge  Conjunctiva/sclera: Conjunctivae normal    Cardiovascular:      Rate and Rhythm: Normal rate  Pulmonary:      Effort: Pulmonary effort is normal  No respiratory distress  Musculoskeletal:      Cervical back: Normal range of motion and neck supple  Comments: As noted in HPI   Skin:     General: Skin is warm and dry  Neurological:      Mental Status: He is alert and oriented to person, place, and time  Psychiatric:         Behavior: Behavior normal          Thought Content: Thought content normal          Judgment: Judgment normal      Hand/upper extremity injection: bilateral carpal tunnel  [place] Protocol:  Consent: Verbal consent obtained    Consent given by: patient  Patient identity confirmed: verbally with patient    Supporting Documentation  Indications: pain   Procedure Details  Condition:carpal tunnel syndrome Site: bilateral carpal tunnel Preparation: Patient was prepped and draped in the usual sterile fashion  Needle size: 27 G  Ultrasound guidance: no  Medications (Right): 0 5 mL lidocaine 1 %; 20 mg triamcinolone acetonide 40 mg/mLMedications (Left): 0 5 mL lidocaine 1 %; 20 mg triamcinolone acetonide 40 mg/mL   Patient tolerance: patient tolerated the procedure well with no immediate complications  Dressing:  Sterile dressing applied             I have personally reviewed pertinent films in PACS and my interpretation is as follows:X-ray bilateral hand performed in the office today demonstrates no osseous abnormalities

## 2022-03-23 ENCOUNTER — TELEPHONE (OUTPATIENT)
Dept: OTHER | Facility: OTHER | Age: 37
End: 2022-03-23

## 2022-03-23 NOTE — TELEPHONE ENCOUNTER
Patient called for appt 3/24  States he woke up twice with dizziness that subsided  He also reports feeling of "body on fire" and "face is flushed" today  He describes dizziness occurs when he turns head quickly  He stated several times he wants to be seen in office and wishes not to have to go to Urgent Care if he can't be seen

## 2022-03-25 ENCOUNTER — OFFICE VISIT (OUTPATIENT)
Dept: FAMILY MEDICINE CLINIC | Facility: CLINIC | Age: 37
End: 2022-03-25
Payer: COMMERCIAL

## 2022-03-25 VITALS
OXYGEN SATURATION: 98 % | RESPIRATION RATE: 20 BRPM | SYSTOLIC BLOOD PRESSURE: 162 MMHG | BODY MASS INDEX: 30.92 KG/M2 | HEART RATE: 111 BPM | DIASTOLIC BLOOD PRESSURE: 92 MMHG | WEIGHT: 228 LBS | TEMPERATURE: 98.3 F

## 2022-03-25 DIAGNOSIS — F41.9 ANXIETY: ICD-10-CM

## 2022-03-25 DIAGNOSIS — R59.9 SWOLLEN GLAND: Primary | ICD-10-CM

## 2022-03-25 DIAGNOSIS — I10 HYPERTENSION, UNSPECIFIED TYPE: ICD-10-CM

## 2022-03-25 DIAGNOSIS — R42 DIZZINESS: ICD-10-CM

## 2022-03-25 LAB — S PYO AG THROAT QL: NEGATIVE

## 2022-03-25 PROCEDURE — 99214 OFFICE O/P EST MOD 30 MIN: CPT | Performed by: FAMILY MEDICINE

## 2022-03-25 PROCEDURE — 1036F TOBACCO NON-USER: CPT | Performed by: FAMILY MEDICINE

## 2022-03-25 PROCEDURE — 87880 STREP A ASSAY W/OPTIC: CPT | Performed by: FAMILY MEDICINE

## 2022-03-25 RX ORDER — CITALOPRAM 10 MG/1
10 TABLET ORAL DAILY
Qty: 30 TABLET | Refills: 1 | Status: SHIPPED | OUTPATIENT
Start: 2022-03-25

## 2022-03-25 RX ORDER — OXYMETAZOLINE HYDROCHLORIDE 0.05 G/100ML
1 SPRAY NASAL DAILY
Qty: 30 ML | Refills: 0 | Status: CANCELLED | OUTPATIENT
Start: 2022-03-25

## 2022-03-25 RX ORDER — LISINOPRIL 10 MG/1
10 TABLET ORAL DAILY
Qty: 30 TABLET | Refills: 1 | Status: SHIPPED | OUTPATIENT
Start: 2022-03-25 | End: 2022-04-18 | Stop reason: SDUPTHER

## 2022-03-25 RX ORDER — PROPRANOLOL HYDROCHLORIDE 20 MG/1
20 TABLET ORAL DAILY
Qty: 30 TABLET | Refills: 0 | Status: CANCELLED | OUTPATIENT
Start: 2022-03-25

## 2022-03-25 NOTE — PATIENT INSTRUCTIONS
Eustachian Tube Dysfunction   AMBULATORY CARE:   Eustachian tube dysfunction (ETD)  is a condition that prevents your eustachian tubes from opening properly  It can also cause them to become blocked  Eustachian tubes connect your middle ear to the back of your nose and throat  These tubes open and allow air to flow in and out when you sneeze, swallow, or yawn  Common signs and symptoms include the following:   · Fullness or pressure in your ears    · Muffled hearing, or a feeling you are hearing under water or have clogged ears    · Pain in one or both ears    · Ringing in your ears    · Popping, crackling, or clicking feeling in your ears    · Trouble keeping your balance    Call your doctor or otolaryngologist if:   · Your symptoms do not improve or get worse  · You have a fever  · You have any hearing loss  · You have questions or concerns about your condition or care  Treatment:  ETD may get better on its own without any treatment  If it continues, you may need any of the following:  · Swallow, yawn, or chew gum  to help open your eustachian tubes  Your healthcare provider may also recommend you blow with your mouth shut and your nostrils pinched closed  · Air pressure devices  push air into your nose and eustachian tubes to help relieve air pressure in your ear  · Treatment for allergies  such as decongestants, antihistamines, and nasal steroids may improve ETD  They may help decrease swelling of the eustachian tubes  · A myringotomy  is surgery to make a hole in your eardrum  The hole relieves pressure and lets fluid drain from your ear  A pressure equalizing (PE) tube may be used to keep the hole open and to help drain fluid  · Tuboplasty  is a procedure to widen your eustachian tubes  Follow up with your doctor or otolaryngologist as directed:  Write down your questions so you remember to ask them during your visits    © Copyright [employer] 2022 Information is for End User's use only and may not be sold, redistributed or otherwise used for commercial purposes  All illustrations and images included in CareNotes® are the copyrighted property of [employer]  or Tanya Rajan  The above information is an  only  It is not intended as medical advice for individual conditions or treatments  Talk to your doctor, nurse or pharmacist before following any medical regimen to see if it is safe and effective for you

## 2022-03-28 NOTE — PROGRESS NOTES
Assessment/Plan:      Diagnoses and all orders for this visit:    Swollen gland  -     Approximately 3 days sore throat after history recurrent episodes of strep  Previously advised have tonsils removed  -     Tonsil stone seen on left tonsils without exudate, no lymphadenopathy, no cough, Rapid strepA  negative  -     low suspicion for strep at this time  -     Ambulatory Referral to Otolaryngology; Future    Eustachian tube dysfunction  Dizziness        -      dizziness for 3-5 days worse with rolling on was left-side at night  -      physical exam was unremarkable apart from dizzy sensation when percussing left mastoid  Ocular motion unremarkable without obvious nystagmus        -      high suspicion swelling associated with inflammation of the left tonsils may be prompting                dysfunction of the left eustachian tube        -      being referred to Otolaryngology for recurrent sore throat, dual benefit as they may address                dizziness if unresolved    Anxiety  -     citalopram (CeleXA) 10 mg tablet; Take 1 tablet (10 mg total) by mouth daily    Hypertension, unspecified type  -     lisinopril (ZESTRIL) 10 mg tablet; Take 1 tablet (10 mg total) by mouth daily          Subjective:     Patient ID: Shiloh Garcia is a 39 y o  male with medical history significant for anxiety/depression, muscle spasms, GERD, hypertension  Presented for which he described as dizzy spells  Patient reports he was lying in bed when he rolled his left side and suddenly felt the room spinning for approximately 20 seconds  This happened on back-to-back night  While awake she could also instill similar feelings by bending over in turning his head too quickly     Patient reports symptoms much improved by time of office visit but still slightly present  He also states that he has long history sore/strep throat    Patient says he had been advised to have his tonsils removed but repeated bouts of sore throat subsided prior to him doing so  Patient has had recurrence sore throat this week with onset around the same time as development of dizziness  Patient denies feeling fevers/chills, generalized body aches, nausea/vomiting, constipation/diarrhea  Review of Systems   Constitutional: Negative for chills and fever  HENT: Positive for sore throat  Negative for congestion, ear pain, hearing loss, rhinorrhea and trouble swallowing  Eyes: Negative for pain  Respiratory: Negative for cough and shortness of breath  Cardiovascular: Negative for chest pain  Gastrointestinal: Negative for constipation, diarrhea, nausea and vomiting  Endocrine: Negative for polyuria  Genitourinary: Negative for difficulty urinating  Musculoskeletal: Negative for arthralgias and myalgias  Neurological: Positive for dizziness  Psychiatric/Behavioral: The patient is nervous/anxious  Objective:     Physical Exam  Constitutional:       General: He is not in acute distress  Appearance: Normal appearance  He is not ill-appearing or toxic-appearing  HENT:      Head: Normocephalic and atraumatic  Right Ear: Tympanic membrane normal       Left Ear: Tympanic membrane normal       Ears:      Comments: Dizziness but no tenderness upon percussion lateral to R ear (mastoid)     Mouth/Throat:      Mouth: Mucous membranes are moist         Comments: Poor dentition  Eyes:      General: Vision grossly intact  Extraocular Movements:      Right eye: No nystagmus  Left eye: No nystagmus  Pupils: Pupils are equal, round, and reactive to light  Cardiovascular:      Rate and Rhythm: Normal rate and regular rhythm  Pulses: Normal pulses  Pulmonary:      Effort: Pulmonary effort is normal       Breath sounds: Normal breath sounds  Abdominal:      Palpations: Abdomen is soft  Tenderness: There is no abdominal tenderness  Musculoskeletal:         General: No deformity   Normal range of motion  Cervical back: Normal range of motion  Right lower leg: No edema  Left lower leg: No edema  Lymphadenopathy:      Cervical: No cervical adenopathy  Skin:     General: Skin is warm  Neurological:      Mental Status: He is alert and oriented to person, place, and time     Psychiatric:         Behavior: Behavior normal

## 2022-03-29 ENCOUNTER — HOSPITAL ENCOUNTER (OUTPATIENT)
Dept: RADIOLOGY | Facility: HOSPITAL | Age: 37
Discharge: HOME | End: 2022-03-29
Attending: ORTHOPAEDIC SURGERY
Payer: COMMERCIAL

## 2022-03-29 DIAGNOSIS — M25.532 PAIN IN LEFT WRIST: ICD-10-CM

## 2022-03-29 DIAGNOSIS — R22.32 MASS OF LEFT WRIST: ICD-10-CM

## 2022-03-29 PROCEDURE — 76882 US LMTD JT/FCL EVL NVASC XTR: CPT

## 2022-04-18 ENCOUNTER — OFFICE VISIT (OUTPATIENT)
Dept: FAMILY MEDICINE CLINIC | Facility: CLINIC | Age: 37
End: 2022-04-18
Payer: COMMERCIAL

## 2022-04-18 VITALS
RESPIRATION RATE: 18 BRPM | HEIGHT: 72 IN | OXYGEN SATURATION: 99 % | TEMPERATURE: 96.9 F | BODY MASS INDEX: 31.29 KG/M2 | SYSTOLIC BLOOD PRESSURE: 128 MMHG | WEIGHT: 231 LBS | HEART RATE: 99 BPM | DIASTOLIC BLOOD PRESSURE: 90 MMHG

## 2022-04-18 DIAGNOSIS — F41.9 ANXIETY: ICD-10-CM

## 2022-04-18 DIAGNOSIS — Z00.00 ANNUAL PHYSICAL EXAM: Primary | ICD-10-CM

## 2022-04-18 DIAGNOSIS — I10 PRIMARY HYPERTENSION: ICD-10-CM

## 2022-04-18 PROBLEM — M62.838 MUSCLE SPASM: Status: RESOLVED | Noted: 2019-03-06 | Resolved: 2022-04-18

## 2022-04-18 PROBLEM — K62.5 BRBPR (BRIGHT RED BLOOD PER RECTUM): Status: RESOLVED | Noted: 2021-07-14 | Resolved: 2022-04-18

## 2022-04-18 PROBLEM — L29.9 PRURITUS: Status: RESOLVED | Noted: 2021-07-03 | Resolved: 2022-04-18

## 2022-04-18 PROBLEM — R03.0 ELEVATED BLOOD PRESSURE, SITUATIONAL: Status: RESOLVED | Noted: 2018-04-27 | Resolved: 2022-04-18

## 2022-04-18 PROCEDURE — 3080F DIAST BP >= 90 MM HG: CPT | Performed by: FAMILY MEDICINE

## 2022-04-18 PROCEDURE — 3008F BODY MASS INDEX DOCD: CPT | Performed by: FAMILY MEDICINE

## 2022-04-18 PROCEDURE — 3074F SYST BP LT 130 MM HG: CPT | Performed by: FAMILY MEDICINE

## 2022-04-18 PROCEDURE — 99395 PREV VISIT EST AGE 18-39: CPT | Performed by: FAMILY MEDICINE

## 2022-04-18 PROCEDURE — 1036F TOBACCO NON-USER: CPT | Performed by: FAMILY MEDICINE

## 2022-04-18 RX ORDER — LISINOPRIL 10 MG/1
10 TABLET ORAL DAILY
Qty: 30 TABLET | Refills: 2 | Status: SHIPPED | OUTPATIENT
Start: 2022-04-18 | End: 2022-07-25 | Stop reason: SDUPTHER

## 2022-04-18 NOTE — PATIENT INSTRUCTIONS
Mediterranean Diet   AMBULATORY CARE:   A Mediterranean diet  is a meal plan that includes foods that are commonly eaten in countries that border the [place]  This meal plan may provide several health benefits  These include losing or maintaining weight, and decreasing blood pressure, blood sugar, and cholesterol levels  It may also help protect against certain health conditions such as heart disease, cancer, type 2 diabetes, and Alzheimer disease  Work with a dietitian to develop a meal plan that is right for you  Foods to include in the [address] [facility] diet:   · Include fruits and vegetables in each meal   Eat a variety of fresh fruits and vegetables  · Choose whole grains every day  These foods include whole-grain breads, pastas, and cereals  It also includes brown rice, quinoa, and millet  · Use unsaturated fats instead of saturated fats  Cook with olive or canola oil  Limit saturated fats, such as butter, margarine, and shortening  Saturated fat is an unhealthy fat that can increase your cholesterol levels  · Choose plant foods, poultry, and fish as your main sources of protein  ? Eat plant-based foods that provide protein,  such as lentils, beans, chickpeas, nuts, and seeds  Choose mostly plant-based foods in place of meat on most days of the week  ? Eat protein foods high in omega-3 fats  Fish high in omega-3 fats include salmon, trout, and tuna  Include these types of fish 1 or 2 times each week  Limit fish high in mercury, such as shark, swordfish, tilefish, and orion mackerel  Omega-3 fats are also found in walnuts and flaxseed  ? Choose poultry (chicken or turkey)  without skin instead of red meat  Red meat is high in saturated fat  Limit eggs and high-fat meats, such as delatorre, sausage, and hot dogs  · Choose low-fat dairy foods  such as nonfat or 1% milk, or low-fat almond, cashew, or soy milk   Other examples include low-fat cheese, yogurt, and cottage cheese  · Limit sweets  Limit your intake of high-sugar foods, such as soda, desserts, and candy  · Talk to your healthcare provider about alcohol  Studies have shown that moderate intake of wine may reduce the risk of heart disease  A moderate amount of wine is 1 serving for women and men 65 years and older each day  Two servings is recommended for men 24to 59years of age each day  A serving of wine is 5 ounces  Other things you need to know if you follow the Mediterranean diet:   · Include foods high in iron and vitamin C   Plant-based foods that are high in iron include spinach, beans, tofu, and artichoke  Eat a serving of vitamin C with any iron-rich food to help your body absorb more iron  Examples include oranges, strawberries, cantaloupe, broccoli, and yellow peppers  · Get regular physical activity  The Mediterranean diet will have the most benefit if you get regular physical activity  Get 30 minutes of physical activity at least 5 days a week  Choose physical activities that increase your heart rate  Examples include walking, hiking, swimming, and riding a bike  Ask your healthcare provider about the best exercise plan for you  © Copyright [employer] 2022 Information is for End User's use only and may not be sold, redistributed or otherwise used for commercial purposes  All illustrations and images included in CareNotes® are the copyrighted property of [employer]  or [employer]  The above information is an  only  It is not intended as medical advice for individual conditions or treatments  Talk to your doctor, nurse or pharmacist before following any medical regimen to see if it is safe and effective for you

## 2022-04-18 NOTE — PROGRESS NOTES
[address] [facility]    NAME: Sabra Foster  AGE: 39 y o  SEX: male  : 1985     DATE: 2022     Assessment and Plan:     1  Annual physical exam  - Discussed diet and exercise  Pt reports meat and vegetable diet with carb heavy lunch or breakfast as he eats 2 meals a day (big dinner and usually skips either lunch or breakfast)  - Encouraged Plant based, whole food diet with less meat  Also encouraged greater exercise  - Patient noted some erectile difficulty but was not interested in medication at this time as he knew his anxiety over side effects would out weigh potential benefit  - Patient verbalizes intention to establish with a dentist     2  Primary hypertension  - Well controlled on medication w/o side effect  - Refill of lisinopril (ZESTRIL) 10 mg tablet; Take 1 tablet (10 mg total) by mouth daily  Dispense: 30 tablet; Refill: 2  - Microalbumin / creatinine urine ratio    3  Anxiety  - Reports occasional side effects from his Celexa (shocks/hypnogogic jerks and nightmares)  - Says he has his symptoms and side effects well balanced and is not interested in changes at this time  Immunizations and preventive care screenings were discussed with patient today  Appropriate education was printed on patient's after visit summary  Counseling:  Dental Health: discussed importance of regular tooth brushing, flossing, and dental visits  · Sexual health: discussed sexually transmitted diseases, erectile disfunction  · Exercise: the importance of regular exercise/physical activity was discussed  Recommend exercise 3-5 times per week for at least 30 minutes  BMI Counseling: Body mass index is 31 33 kg/m²  The BMI is above normal  Nutrition recommendations include decreasing portion sizes and reducing intake of cholesterol  Exercise recommendations include moderate physical activity 150 minutes/week  No pharmacotherapy was ordered  Rationale for BMI follow-up plan is due to patient being overweight or obese  No follow-ups on file  Chief Complaint:     Chief Complaint   Patient presents with    Blood Pressure Check      History of Present Illness:     Adult Annual Physical   Patient here for a comprehensive physical exam  The patient reports no problems  Diet and Physical Activity  · Diet/Nutrition: high fat diet, limited fruits/vegetables and limited fast food  · Exercise: no formal exercise  Depression Screening  PHQ-2/9 Depression Screening         General Health  · Sleep: sleeps well and gets 7-8 hours of sleep on average  · Hearing: normal - bilateral   · Vision: most recent eye exam >1 year ago and feels slight decline  · Dental: no dental visits for >1 year, brushes teeth once daily, does not floss and chews tobacco         Health  · History of STDs?: no   · Erectile Dysfunction: Reports occasional dysfunction since Celexa but is not ready to start Viagra or other medication right now due to anxiety     Review of Systems:     Review of Systems   Constitutional: Negative for activity change, chills and fever  HENT: Negative for congestion, ear pain, hearing loss, rhinorrhea, sore throat and trouble swallowing  Eyes: Positive for visual disturbance (Vague decline in vision with age but reports 20/20 on recent exam)  Negative for pain  Respiratory: Positive for cough (recent URI now greatly improved)  Negative for shortness of breath  Cardiovascular: Negative for chest pain and palpitations  Gastrointestinal: Negative for constipation, diarrhea, nausea and vomiting  Endocrine: Negative for polyuria  Genitourinary: Negative for difficulty urinating  Musculoskeletal: Negative for arthralgias and myalgias  Neurological: Negative for dizziness, light-headedness and headaches  Psychiatric/Behavioral: Positive for sleep disturbance  The patient is nervous/anxious         Past Medical History: Past Medical History:   Diagnosis Date    Anxiety     Anxiety and depression     GERD (gastroesophageal reflux disease)     Psychiatric disorder       Past Surgical History:     Past Surgical History:   Procedure Laterality Date    CHOLECYSTECTOMY      IN LAP,CHOLECYSTECTOMY N/A 2021    Procedure: CHOLECYSTECTOMY LAPAROSCOPIC;  Surgeon: Aquiles Arnold MD;  Location: [facility];  Service: General    WISDOM TOOTH EXTRACTION        Social History:     Social History     Socioeconomic History    Marital status: Single     Spouse name: None    Number of children: None    Years of education: None    Highest education level: None   Occupational History    None   Tobacco Use    Smoking status: Former Smoker     Years: 10      Quit date: 2016     Years since quittin 8    Smokeless tobacco: Current User     Types: Chew   Vaping Use    Vaping Use: Never used   Substance and Sexual Activity    Alcohol use: No    Drug use: No    Sexual activity: Yes     Partners: Female   Other Topics Concern    None   Social History Narrative    None     Social Determinants of Health     Financial Resource Strain: Not on file   Food Insecurity: Not on file   Transportation Needs: Not on file   Physical Activity: Not on file   Stress: Not on file   Social Connections: Not on file   Intimate Partner Violence: Not on file   Housing Stability: Not on file      Family History:     Family History   Problem Relation Age of Onset    Anxiety disorder Father     Hypertension Father       Current Medications:     Current Outpatient Medications   Medication Sig Dispense Refill    citalopram (CeleXA) 10 mg tablet Take 1 tablet (10 mg total) by mouth daily 30 tablet 1    lisinopril (ZESTRIL) 10 mg tablet Take 1 tablet (10 mg total) by mouth daily 30 tablet 1    omeprazole (PriLOSEC) 20 mg delayed release capsule Take 20 mg by mouth daily       ibuprofen (MOTRIN) 600 mg tablet Take 1 tablet (600 mg total) by mouth every 6 (six) hours as needed for mild pain (Patient not taking: Reported on 4/18/2022 ) 20 tablet 0     No current facility-administered medications for this visit  Allergies:     No Known Allergies   Physical Exam:     /90 (BP Location: Left arm, Patient Position: Sitting, Cuff Size: Adult)   Pulse 99   Temp (!) 96 9 °F (36 1 °C) (Tympanic)   Resp 18   Ht 6' (1 829 m)   Wt 105 kg (231 lb)   SpO2 99%   BMI 31 33 kg/m²     Physical Exam  Vitals and nursing note reviewed  Constitutional:       Appearance: He is well-developed  HENT:      Head: Normocephalic and atraumatic  Right Ear: Tympanic membrane normal       Left Ear: Tympanic membrane normal       Nose: Nose normal       Mouth/Throat:      Mouth: Mucous membranes are moist       Comments: Tonsil stone on L  Eyes:      Conjunctiva/sclera: Conjunctivae normal    Cardiovascular:      Rate and Rhythm: Normal rate and regular rhythm  Heart sounds: Normal heart sounds  No murmur heard  Pulmonary:      Effort: Pulmonary effort is normal  No respiratory distress  Breath sounds: Normal breath sounds  Abdominal:      Palpations: Abdomen is soft  Tenderness: There is no abdominal tenderness  Musculoskeletal:         General: Normal range of motion  Skin:     General: Skin is warm and dry  Neurological:      Mental Status: He is alert and oriented to person, place, and time  Psychiatric:         Mood and Affect: Mood normal          Behavior: Behavior normal           [facility]      BMI Counseling: Body mass index is 31 33 kg/m²  The BMI is above normal  Nutrition recommendations include reducing portion sizes, 3-5 servings of fruits/vegetables daily and reducing intake of cholesterol  Exercise recommendations include moderate aerobic physical activity for 150 minutes/week and exercising 3-5 times per week

## 2022-04-19 LAB
ALBUMIN/CREAT UR: <7 MG/G CREAT (ref 0–29)
CREAT UR-MCNC: 41.6 MG/DL
MICROALBUMIN UR-MCNC: <3 UG/ML

## 2022-05-08 ENCOUNTER — OFFICE VISIT (OUTPATIENT)
Dept: URGENT CARE | Facility: CLINIC | Age: 37
End: 2022-05-08
Payer: COMMERCIAL

## 2022-05-08 VITALS — OXYGEN SATURATION: 96 % | TEMPERATURE: 98.2 F | RESPIRATION RATE: 14 BRPM | HEART RATE: 83 BPM

## 2022-05-08 DIAGNOSIS — R09.82 ALLERGIC RHINITIS WITH POSTNASAL DRIP: Primary | ICD-10-CM

## 2022-05-08 DIAGNOSIS — J30.9 ALLERGIC RHINITIS WITH POSTNASAL DRIP: Primary | ICD-10-CM

## 2022-05-08 PROCEDURE — 99213 OFFICE O/P EST LOW 20 MIN: CPT | Performed by: PHYSICIAN ASSISTANT

## 2022-05-08 PROCEDURE — 1036F TOBACCO NON-USER: CPT | Performed by: PHYSICIAN ASSISTANT

## 2022-05-08 RX ORDER — FLUTICASONE PROPIONATE 50 MCG
1 SPRAY, SUSPENSION (ML) NASAL DAILY
Qty: 18.2 ML | Refills: 0 | Status: SHIPPED | OUTPATIENT
Start: 2022-05-08 | End: 2022-07-04

## 2022-05-08 NOTE — PROGRESS NOTES
[address] [facility]        NAME: Marah Mendes is a 39 y o  male  : 1985    MRN: [id number]  DATE: May 8, 2022  TIME: 10:17 AM    Assessment and Plan   Allergic rhinitis with postnasal drip [J30 9, R09 82]  1  Allergic rhinitis with postnasal drip  fluticasone (FLONASE) 50 mcg/act nasal spray         Patient Instructions     Patient Instructions   Use Flonase as directed  Can take over-the-counter cough and cold medication as needed  Follow-up with PCP  Return or be seen in ER with any progressing or worsening symptoms  Patient understands and is agreeable to this plan  Discussed with patient no indication for COVID flu testing at this time as patient is already out of quarantine/isolation requirements and having resolving symptoms  Follow up with PCP in 3-5 days  Proceed to  ER if symptoms worsen  Chief Complaint     Chief Complaint   Patient presents with    Cough     pt presents with a linguring cough that started last Monday; states all other symptoms ( runny nose, head congestion, chest congestion) has stopped         History of Present Illness       Patient is a 25-year-old male presenting today with cold symptoms x1 week  Patient notes last week he was experiencing some nasal congestion, cough and fatigue, notes that all other symptoms have resolved but he still has a persistent dry cough, notes that the cough is worse at night and when in a recumbent position, has not been taking any medications for symptoms as often cold medication increases his anxiety  Notes that his son was diagnosed with RSV last week  Denies fever, chills, SOB, chest tightness, lightheadedness, N/V/D  Review of Systems   Review of Systems   Constitutional: Positive for fatigue  Negative for chills and fever  HENT: Positive for congestion  Negative for ear pain, sinus pressure, sore throat and trouble swallowing  Eyes: Negative for pain  Respiratory: Positive for cough   Negative for chest tightness and shortness of breath  Cardiovascular: Negative for chest pain  Gastrointestinal: Negative for abdominal pain  Musculoskeletal: Negative for myalgias  Skin: Negative for rash  Neurological: Negative for headaches  Current Medications       Current Outpatient Medications:     citalopram (CeleXA) 10 mg tablet, Take 1 tablet (10 mg total) by mouth daily, Disp: 30 tablet, Rfl: 1    lisinopril (ZESTRIL) 10 mg tablet, Take 1 tablet (10 mg total) by mouth daily, Disp: 30 tablet, Rfl: 2    omeprazole (PriLOSEC) 20 mg delayed release capsule, Take 20 mg by mouth daily , Disp: , Rfl:     fluticasone (FLONASE) 50 mcg/act nasal spray, 1 spray into each nostril daily, Disp: 18 2 mL, Rfl: 0    Current Allergies     Allergies as of 05/08/2022    (No Known Allergies)            The following portions of the patient's history were reviewed and updated as appropriate: allergies, current medications, past family history, past medical history, past social history, past surgical history and problem list      Past Medical History:   Diagnosis Date    Anxiety     Anxiety and depression     GERD (gastroesophageal reflux disease)     Psychiatric disorder        Past Surgical History:   Procedure Laterality Date    CHOLECYSTECTOMY  2021    ME LAP,CHOLECYSTECTOMY N/A 6/25/2021    Procedure: CHOLECYSTECTOMY LAPAROSCOPIC;  Surgeon: Jolie Israel MD;  Location: [facility];  Service: General    WISDOM TOOTH EXTRACTION         Family History   Problem Relation Age of Onset    Anxiety disorder Father     Hypertension Father          Medications have been verified  Objective   Pulse 83   Temp 98 2 °F (36 8 °C)   Resp 14   SpO2 96%        Physical Exam     Physical Exam  Vitals and nursing note reviewed  Constitutional:       General: He is not in acute distress  Appearance: Normal appearance  He is not toxic-appearing  HENT:      Head: Normocephalic and atraumatic        Right Ear: Tympanic membrane, ear canal and external ear normal       Left Ear: Tympanic membrane, ear canal and external ear normal       Nose: Congestion present  Comments: Inflamed nasal mucosa     Mouth/Throat:      Mouth: Mucous membranes are moist       Comments: Mild erythema of pharynx, findings consistent with postnasal drip, no tonsillar swelling erythema or exudate, uvula midline  Eyes:      Conjunctiva/sclera: Conjunctivae normal    Cardiovascular:      Rate and Rhythm: Normal rate and regular rhythm  Pulses: Normal pulses  Heart sounds: Normal heart sounds  Pulmonary:      Effort: Pulmonary effort is normal       Breath sounds: Normal breath sounds  Lymphadenopathy:      Cervical: No cervical adenopathy  Skin:     General: Skin is warm  Capillary Refill: Capillary refill takes less than 2 seconds  Neurological:      General: No focal deficit present  Mental Status: He is alert and oriented to person, place, and time

## 2022-05-08 NOTE — PATIENT INSTRUCTIONS
Use Flonase as directed  Can take over-the-counter cough and cold medication as needed  Follow-up with PCP  Return or be seen in ER with any progressing or worsening symptoms  Patient understands and is agreeable to this plan  Discussed with patient no indication for COVID flu testing at this time as patient is already out of quarantine/isolation requirements and having resolving symptoms

## 2022-05-10 ENCOUNTER — HOSPITAL ENCOUNTER (EMERGENCY)
Facility: HOSPITAL | Age: 37
Discharge: HOME | End: 2022-05-11
Attending: EMERGENCY MEDICINE
Payer: COMMERCIAL

## 2022-05-10 DIAGNOSIS — R07.89 ATYPICAL CHEST PAIN: Primary | ICD-10-CM

## 2022-05-10 LAB
ANION GAP SERPL CALCULATED.3IONS-SCNC: 9 MMOL/L (ref 4–13)
BASOPHILS # BLD AUTO: 0.06 THOUSANDS/ΜL (ref 0–0.1)
BASOPHILS NFR BLD AUTO: 1 % (ref 0–1)
BUN SERPL-MCNC: 13 MG/DL (ref 5–25)
CALCIUM SERPL-MCNC: 9.4 MG/DL (ref 8.3–10.1)
CARDIAC TROPONIN I PNL SERPL HS: 6 NG/L
CHLORIDE SERPL-SCNC: 100 MMOL/L (ref 100–108)
CO2 SERPL-SCNC: 29 MMOL/L (ref 21–32)
CREAT SERPL-MCNC: 0.98 MG/DL (ref 0.6–1.3)
D DIMER PPP FEU-MCNC: <0.27 UG/ML FEU
EOSINOPHIL # BLD AUTO: 0.29 THOUSAND/ΜL (ref 0–0.61)
EOSINOPHIL NFR BLD AUTO: 2 % (ref 0–6)
ERYTHROCYTE [DISTWIDTH] IN BLOOD BY AUTOMATED COUNT: 12.9 % (ref 11.6–15.1)
GFR SERPL CREATININE-BSD FRML MDRD: 98 ML/MIN/1.73SQ M
GLUCOSE SERPL-MCNC: 102 MG/DL (ref 65–140)
HCT VFR BLD AUTO: 51.9 % (ref 36.5–49.3)
HGB BLD-MCNC: 16.9 G/DL (ref 12–17)
IMM GRANULOCYTES # BLD AUTO: 0.05 THOUSAND/UL (ref 0–0.2)
IMM GRANULOCYTES NFR BLD AUTO: 0 % (ref 0–2)
LYMPHOCYTES # BLD AUTO: 4.65 THOUSANDS/ΜL (ref 0.6–4.47)
LYMPHOCYTES NFR BLD AUTO: 36 % (ref 14–44)
MCH RBC QN AUTO: 28.2 PG (ref 26.8–34.3)
MCHC RBC AUTO-ENTMCNC: 32.6 G/DL (ref 31.4–37.4)
MCV RBC AUTO: 87 FL (ref 82–98)
MONOCYTES # BLD AUTO: 0.73 THOUSAND/ΜL (ref 0.17–1.22)
MONOCYTES NFR BLD AUTO: 6 % (ref 4–12)
NEUTROPHILS # BLD AUTO: 7.1 THOUSANDS/ΜL (ref 1.85–7.62)
NEUTS SEG NFR BLD AUTO: 55 % (ref 43–75)
NRBC BLD AUTO-RTO: 0 /100 WBCS
PLATELET # BLD AUTO: 332 THOUSANDS/UL (ref 149–390)
PMV BLD AUTO: 9.4 FL (ref 8.9–12.7)
POTASSIUM SERPL-SCNC: 3.7 MMOL/L (ref 3.5–5.3)
RBC # BLD AUTO: 5.99 MILLION/UL (ref 3.88–5.62)
SODIUM SERPL-SCNC: 138 MMOL/L (ref 136–145)
WBC # BLD AUTO: 12.88 THOUSAND/UL (ref 4.31–10.16)

## 2022-05-10 PROCEDURE — 80048 BASIC METABOLIC PNL TOTAL CA: CPT | Performed by: EMERGENCY MEDICINE

## 2022-05-10 PROCEDURE — 36415 COLL VENOUS BLD VENIPUNCTURE: CPT | Performed by: EMERGENCY MEDICINE

## 2022-05-10 PROCEDURE — 93005 ELECTROCARDIOGRAM TRACING: CPT

## 2022-05-10 PROCEDURE — 96360 HYDRATION IV INFUSION INIT: CPT

## 2022-05-10 PROCEDURE — 85025 COMPLETE CBC W/AUTO DIFF WBC: CPT | Performed by: EMERGENCY MEDICINE

## 2022-05-10 PROCEDURE — 99285 EMERGENCY DEPT VISIT HI MDM: CPT

## 2022-05-10 PROCEDURE — 85379 FIBRIN DEGRADATION QUANT: CPT | Performed by: EMERGENCY MEDICINE

## 2022-05-10 PROCEDURE — 84484 ASSAY OF TROPONIN QUANT: CPT | Performed by: EMERGENCY MEDICINE

## 2022-05-10 RX ORDER — MIDAZOLAM HYDROCHLORIDE 2 MG/2ML
1 INJECTION, SOLUTION INTRAMUSCULAR; INTRAVENOUS ONCE
Status: DISCONTINUED | OUTPATIENT
Start: 2022-05-10 | End: 2022-05-11 | Stop reason: HOSPADM

## 2022-05-10 RX ADMIN — SODIUM CHLORIDE 1000 ML: 0.9 INJECTION, SOLUTION INTRAVENOUS at 23:46

## 2022-05-11 ENCOUNTER — APPOINTMENT (EMERGENCY)
Dept: RADIOLOGY | Facility: HOSPITAL | Age: 37
End: 2022-05-11
Payer: COMMERCIAL

## 2022-05-11 VITALS
OXYGEN SATURATION: 95 % | TEMPERATURE: 98.6 F | SYSTOLIC BLOOD PRESSURE: 140 MMHG | HEART RATE: 85 BPM | DIASTOLIC BLOOD PRESSURE: 88 MMHG | RESPIRATION RATE: 17 BRPM

## 2022-05-11 LAB
2HR DELTA HS TROPONIN: 0 NG/L
CARDIAC TROPONIN I PNL SERPL HS: 6 NG/L

## 2022-05-11 PROCEDURE — 96361 HYDRATE IV INFUSION ADD-ON: CPT

## 2022-05-11 PROCEDURE — 36415 COLL VENOUS BLD VENIPUNCTURE: CPT | Performed by: EMERGENCY MEDICINE

## 2022-05-11 PROCEDURE — 71046 X-RAY EXAM CHEST 2 VIEWS: CPT

## 2022-05-11 PROCEDURE — 99285 EMERGENCY DEPT VISIT HI MDM: CPT | Performed by: EMERGENCY MEDICINE

## 2022-05-11 PROCEDURE — 84484 ASSAY OF TROPONIN QUANT: CPT | Performed by: EMERGENCY MEDICINE

## 2022-05-11 NOTE — ED PROVIDER NOTES
Final Diagnosis:  1  Atypical chest pain        Chief Complaint   Patient presents with    Chest Pain     Pt reports left sided chest/nipple starting 2 hours pta, describes pain as sharp and pulsing, "feels like a nerve"  Pt with hx of GERD as well, anxious at time of triage  HPI  39-year-old history of anxiety and GERD    Patient presents with left-sided chest pain  He feels like somebody pulled his nipple really hard  Started 2 hours prior to arrival comes and goes  No associated nausea vomiting  Very anxious and tachycardic during my evaluation however improved with fluids declines Versed  EKG she looks normal   No history of clots no CAD  Does not go down into the abdomen back jaw arm etcetera  - No language barrier    - History obtained from patient  - There are no limitations to the history obtained  - Previous charting underwent limited review with attention to last ED visits, labs, ekgs, and prior imaging  PMH:   has a past medical history of Anxiety, Anxiety and depression, GERD (gastroesophageal reflux disease), and Psychiatric disorder  PSH:   has a past surgical history that includes [place] tooth extraction; pr lap,cholecystectomy (N/A, 6/25/2021); and Cholecystectomy (2021)  Social History:    Tobacco Use: High Risk    Smoking Tobacco Use: Former Smoker    Smokeless Tobacco Use: Current User     Alcohol Use: Not on file     Patient with no illicit use    ROS:    Pertinent positives/negatives:   Review of Systems   Cardiovascular: Positive for chest pain  CONSTITUTIONAL:  No dizziness  No weakness  No unexpected weight loss  EYES:  No pain, erythema, or discharge  No loss of vision  ENT:  No tinnitus, decreased hearing  No epistaxis/purulent drainage  No voice change, airway closing, trismus  CARDIOVASCULAR:  No chest pain  No palpitations  No new lower extremity edema  RESPIRATORY:  No purulent cough  No hemoptysis  No dyspnea  No paroxysmal nocturnal dyspnea   No stridor, audible wheezing bedside  GASTROINTESTINAL:  Normal appetite  No vomiting, diarrhea  No pain  No bloating  No melena  GENITOURINARY:  No frequency, urgency, nocturia  No hematuria or dysuria  No discharge  No sores/adenopathy  MUSCULOSKELETAL:  No arthralgias or myalgias that are new  INTEGUMENTARY:  No swelling  No unexpected contusions  No abrasions  No lymphangitis  NEUROLOGIC:  No meningismus  No numbness of the extremities  No new focal weakness  No postural instability  PSYCHIATRIC:  No SI HI AVH  HEMATOLOGICAL:  No bleeding  No petechiae  No bruising  ALLERGIES:  No urticaria  No sudden abd cramping  No stridor  PE:     Physical exam highlights:   Physical Exam       Vitals:    05/11/22 0115 05/11/22 0130 05/11/22 0145 05/11/22 0200   BP: 137/83 139/86 139/85 146/87   BP Location: Left arm Left arm Left arm Left arm   Pulse: 91 94 88 88   Resp: 17 17 19 14   Temp:       TempSrc:       SpO2: 94% 95% 95% 94%     Vitals reviewed by me  Nursing note reviewed  Chaperone present for all sensitive exam   Const: No acute distress  Alert  Nontoxic  Not diaphoretic  HEENT: External ears normal  No protrusion drainage swelling  Nose normal  No drainage/traumatic deformity  MMM  Mouth with baseline/symmetric movement  No trismus  Eyes: No squinting  No icterus  Tracks through the room with normal EOM  No tearing/swelling/drainage  Neck: ROM normal  No rigidity  No meningismus  Cards: Rate as per vitals  Compared to monitor sinus unless documented above  Regular  Well perfused  Pulm: able to verbalize without additional effort  Effort and excursion normal  No disress  No audible wheezing/ stridor  Normal resp rate  Abd: No distension beyond baseline  No fluctuant wave  Patient without peritoneal pain with shifting/bumping the bed  MSK: ROM normal and baseline  No deformity  Skin: No new rashes visible  Well perfused  Neuro: Nonfocal  Baseline  CN grossly intact   Moving all four with coordination  Psych: Normal behavior and affect  A:  - Nursing note reviewed  Ddx and MDM  Atypical acs - delta  PE - dimer  pneumo - xr  penumonia - xr - no o2 req      HEART Risk Score    Flowsheet Row Most Recent Value   Heart Score Risk Calculator    History 0 Filed at: 05/10/2022 2328   ECG 0 Filed at: 05/10/2022 2328   Age 0 Filed at: 05/10/2022 2328   Risk Factors 1 Filed at: 05/10/2022 2328   Troponin 0 Filed at: 05/10/2022 2328   HEART Score 1 Filed at: 05/10/2022 2328                       ED Course as of 05/11/22 0231   Tue May 10, 2022   2342 Procedure Note: EKG  Date/Time: 05/10/22 11:42 PM   Interpreted by: Sergio Dowd  Indications / Diagnosis: CP  ECG reviewed by me, the ED Provider: yes   The EKG demonstrates:  Rhythm: sinus  tach 107  Intervals: normal intervals  Axis: normal axis  QRS/Blocks: normal QRS  ST Changes: No acute ST Changes, no STD/HARDIK    T wave changes: none  [id number] sinus tach         XR chest 2 views   ED Interpretation   No acute cardiopulm process        Orders Placed This Encounter   Procedures    XR chest 2 views    CBC and differential    D-dimer, quantitative    Basic metabolic panel    HS Troponin 0hr (reflex protocol)    HS Troponin I 2hr    HS Troponin I 4hr    ECG 12 lead     Labs Reviewed   CBC AND DIFFERENTIAL - Abnormal       Result Value Ref Range Status    WBC 12 88 (*) 4 31 - 10 16 Thousand/uL Final    RBC 5 99 (*) 3 88 - 5 62 Million/uL Final    Hemoglobin 16 9  12 0 - 17 0 g/dL Final    Hematocrit 51 9 (*) 36 5 - 49 3 % Final    MCV 87  82 - 98 fL Final    MCH 28 2  26 8 - 34 3 pg Final    MCHC 32 6  31 4 - 37 4 g/dL Final    RDW 12 9  11 6 - 15 1 % Final    MPV 9 4  8 9 - 12 7 fL Final    Platelets 368  327 - 390 Thousands/uL Final    nRBC 0  /100 WBCs Final    Neutrophils Relative 55  43 - 75 % Final    Immat GRANS % 0  0 - 2 % Final    Lymphocytes Relative 36  14 - 44 % Final    Monocytes Relative 6  4 - 12 % Final    Eosinophils Relative 2 0 - 6 % Final    Basophils Relative 1  0 - 1 % Final    Neutrophils Absolute 7 10  1 85 - 7 62 Thousands/µL Final    Immature Grans Absolute 0 05  0 00 - 0 20 Thousand/uL Final    Lymphocytes Absolute 4 65 (*) 0 60 - 4 47 Thousands/µL Final    Monocytes Absolute 0 73  0 17 - 1 22 Thousand/µL Final    Eosinophils Absolute 0 29  0 00 - 0 61 Thousand/µL Final    Basophils Absolute 0 06  0 00 - 0 10 Thousands/µL Final   D-DIMER, QUANTITATIVE - Normal    D-Dimer, Quant <0 27  <0 50 ug/ml FEU Final    Comment: Reference and upper limits to exclude DVT and PE are the same  Do not use to exclude if clinical symptoms are present  HS TROPONIN I 0HR - Normal    hs TnI 0hr 6  "Refer to ACS Flowchart"- see link ng/L Final    Comment:                                              Initial (time 0) result  If >=50 ng/L, Myocardial injury suggested ;  Type of myocardial injury and treatment strategy  to be determined  If 5-49 ng/L, a delta result at 2 hours and or 4 hours will be needed to further evaluate  If <4 ng/L, and chest pain has been >3 hours since onset, patient may qualify for discharge based on the HEART score in the ED  If <5 ng/L and <3hours since onset of chest pain, a delta result at 2 hours will be needed to further evaluate  HS Troponin 99th Percentile URL of a Health Population=12 ng/L with a 95% Confidence Interval of 8-18 ng/L  Second Troponin (time 2 hours)  If calculated delta >= 20 ng/L,  Myocardial injury suggested ; Type of myocardial injury and treatment strategy to be determined  If 5-49 ng/L and the calculated delta is 5-19 ng/L, consult medical service for evaluation  Continue evaluation for ischemia on ecg and other possible etiology and repeat hs troponin at 4 hours  If delta is <5 ng/L at 2 hours, consider discharge based on risk stratification via the HEART score (if in ED), or KATHERINE risk score in IP/Observation      HS Troponin 99th Percentile URL of a Health Population=12 ng/L with a 95% Confidence Interval of 8-18 ng/L    HS TROPONIN I 2HR - Normal    hs TnI 2hr 6  "Refer to ACS Flowchart"- see link ng/L Final    Comment:                                              Initial (time 0) result  If >=50 ng/L, Myocardial injury suggested ;  Type of myocardial injury and treatment strategy  to be determined  If 5-49 ng/L, a delta result at 2 hours and or 4 hours will be needed to further evaluate  If <4 ng/L, and chest pain has been >3 hours since onset, patient may qualify for discharge based on the HEART score in the ED  If <5 ng/L and <3hours since onset of chest pain, a delta result at 2 hours will be needed to further evaluate  HS Troponin 99th Percentile URL of a Health Population=12 ng/L with a 95% Confidence Interval of 8-18 ng/L  Second Troponin (time 2 hours)  If calculated delta >= 20 ng/L,  Myocardial injury suggested ; Type of myocardial injury and treatment strategy to be determined  If 5-49 ng/L and the calculated delta is 5-19 ng/L, consult medical service for evaluation  Continue evaluation for ischemia on ecg and other possible etiology and repeat hs troponin at 4 hours  If delta is <5 ng/L at 2 hours, consider discharge based on risk stratification via the HEART score (if in ED), or KATHERINE risk score in IP/Observation  HS Troponin 99th Percentile URL of a Health Population=12 ng/L with a 95% Confidence Interval of 8-18 ng/L      Delta 2hr hsTnI 0  <20 ng/L Final   BASIC METABOLIC PANEL    Sodium 540  136 - 145 mmol/L Final    Potassium 3 7  3 5 - 5 3 mmol/L Final    Chloride 100  100 - 108 mmol/L Final    CO2 29  21 - 32 mmol/L Final    ANION GAP 9  4 - 13 mmol/L Final    BUN 13  5 - 25 mg/dL Final    Creatinine 0 98  0 60 - 1 30 mg/dL Final    Comment: Standardized to IDMS reference method    Glucose 102  65 - 140 mg/dL Final    Comment: If the patient is fasting, the ADA then defines impaired fasting glucose as > 100 mg/dL and diabetes as > or equal to 123 mg/dL  Specimen collection should occur prior to Sulfasalazine administration due to the potential for falsely depressed results  Specimen collection should occur prior to Sulfapyridine administration due to the potential for falsely elevated results  Calcium 9 4  8 3 - 10 1 mg/dL Final    eGFR 98  ml/min/1 73sq m Final    Narrative:     [place] guidelines for Chronic Kidney Disease (CKD):     Stage 1 with normal or high GFR (GFR > 90 mL/min/1 73 square meters)    Stage 2 Mild CKD (GFR = 60-89 mL/min/1 73 square meters)    Stage 3A Moderate CKD (GFR = 45-59 mL/min/1 73 square meters)    Stage 3B Moderate CKD (GFR = 30-44 mL/min/1 73 square meters)    Stage 4 Severe CKD (GFR = 15-29 mL/min/1 73 square meters)    Stage 5 End Stage CKD (GFR <15 mL/min/1 73 square meters)  Note: GFR calculation is accurate only with a steady state creatinine   HS TROPONIN I 4HR       Final Diagnosis:  1  Atypical chest pain        P:  - hospital tx includes   Medications   midazolam (VERSED) injection 1 mg (0 mg Intravenous Hold 5/10/22 7407)   sodium chloride 0 9 % bolus 1,000 mL (1,000 mL Intravenous New Bag 5/10/22 5225)         - disposition  Time reflects when diagnosis was documented in both MDM as applicable and the Disposition within this note     Time User Action Codes Description Comment    5/11/2022  2:27 AM Leonarda Guzmán Add [R07 89] Atypical chest pain       ED Disposition     ED Disposition   Discharge    Condition   Stable    Date/Time   Wed May 11, 2022  2:27 AM    Navjot Solano discharge to home/self care  Follow-up Information     Follow up With Specialties Details Why [place], MD [facility]   [facility]  [address] [id number]            - patient will call their PCP to let them know they were in the emergency department   We discuss return precautions       - additional tx intended, if consistent with primary provider:  - patient to follow with :      Patient's Medications   Discharge Prescriptions    No medications on file     No discharge procedures on file  Prior to Admission Medications   Prescriptions Last Dose Informant Patient Reported? Taking?   citalopram (CeleXA) 10 mg tablet 2022 at Unknown time  No Yes   Sig: Take 1 tablet (10 mg total) by mouth daily   fluticasone (FLONASE) 50 mcg/act nasal spray Not Taking at Unknown time  No No   Si spray into each nostril daily   Patient not taking: Reported on 5/10/2022    lisinopril (ZESTRIL) 10 mg tablet 2022 at Unknown time  No Yes   Sig: Take 1 tablet (10 mg total) by mouth daily   omeprazole (PriLOSEC) 20 mg delayed release capsule 2022 at Unknown time Self Yes Yes   Sig: Take 20 mg by mouth daily       Facility-Administered Medications: None       Portions of the record may have been created with voice recognition software  Occasional wrong word or "sound a like" substitutions may have occurred due to the inherent limitations of voice recognition software  Read the chart carefully and recognize, using context, where substitutions have occurred      Electronically signed by:  MD Nhung Tidwell MD  22 [phone]

## 2022-05-12 LAB
ATRIAL RATE: 107 BPM
P AXIS: 63 DEGREES
PR INTERVAL: 152 MS
QRS AXIS: 62 DEGREES
QRSD INTERVAL: 96 MS
QT INTERVAL: 336 MS
QTC INTERVAL: 448 MS
T WAVE AXIS: 73 DEGREES
VENTRICULAR RATE: 107 BPM

## 2022-05-12 PROCEDURE — 93010 ELECTROCARDIOGRAM REPORT: CPT | Performed by: INTERNAL MEDICINE

## 2022-06-06 ENCOUNTER — OFFICE VISIT (OUTPATIENT)
Dept: FAMILY MEDICINE CLINIC | Facility: CLINIC | Age: 37
End: 2022-06-06
Payer: COMMERCIAL

## 2022-06-06 VITALS
TEMPERATURE: 97.8 F | BODY MASS INDEX: 30.52 KG/M2 | RESPIRATION RATE: 18 BRPM | OXYGEN SATURATION: 98 % | HEART RATE: 100 BPM | SYSTOLIC BLOOD PRESSURE: 132 MMHG | WEIGHT: 225 LBS | DIASTOLIC BLOOD PRESSURE: 90 MMHG

## 2022-06-06 DIAGNOSIS — E66.9 OBESITY (BMI 30-39.9): ICD-10-CM

## 2022-06-06 DIAGNOSIS — F41.9 ANXIETY: Primary | ICD-10-CM

## 2022-06-06 PROCEDURE — 3725F SCREEN DEPRESSION PERFORMED: CPT | Performed by: FAMILY MEDICINE

## 2022-06-06 PROCEDURE — 1036F TOBACCO NON-USER: CPT | Performed by: FAMILY MEDICINE

## 2022-06-06 PROCEDURE — 99213 OFFICE O/P EST LOW 20 MIN: CPT | Performed by: FAMILY MEDICINE

## 2022-06-06 PROCEDURE — 3080F DIAST BP >= 90 MM HG: CPT | Performed by: FAMILY MEDICINE

## 2022-06-06 PROCEDURE — 3075F SYST BP GE 130 - 139MM HG: CPT | Performed by: FAMILY MEDICINE

## 2022-06-06 RX ORDER — BUPROPION HYDROCHLORIDE 75 MG/1
75 TABLET ORAL
Qty: 45 TABLET | Refills: 0 | Status: SHIPPED | OUTPATIENT
Start: 2022-06-06

## 2022-06-06 NOTE — PATIENT INSTRUCTIONS
Wellbutrin 75mg every night for 1 week then increase to 150mg for the rest of the month    Gently increase Exercise 30-60 minutes 3 times a week    Omega-3 over the counter    Diet as below:      The MIND diet is a variation and combination of a healthy Mediterranean and DASH diet with scientific evidence to support neurological and mental health  Thus it is an excellent healthy eating pattern for many people with neurological disorders such as peripheral neuropathy, nerve injuries such as Bell's Palsy,  cerebrovascular accidents (strokes), cognitive impairment, early dementia  It palak also be helpful in mental health issues such as depression, anxiety, neurodevelopmental disorders such as ADHD, Autism spectrum  Your doctor and/or nutritionist can individualize the  MIND diet to meet your specific clinical situation  Note that if your are diabetic and you are on blood sugar lowering medications such as insulin or sulfonylureas (like glipizide, glimepiride, etc), this diet is so effective at improving and even reversing diabetes that your need for medication may go way down  To avoid very low blood sugar (hypoglycemia),  be sure to monitor your blood sugar very closely and adjust down your medications if needed under the guidance of your physician  https://imagesvc [employer] io/[email or web address]/mm/image?url=https%3A%2F%2Fstatic onecms  io%2Fwp-content%2Fuploads%2Fsites%2F12%0A2838%2F02%2Flentil-arugula-avocado-salad-2000 jpg    Here are the key aspects of the MIND diet:    Eat whole, unprocessed food, mainly plants  'Eat the rainbow' - eat different color veggies to get the full range of their nutrients  Vegetables should be about 1/2 of the plate, 1/4 of plate a (mainly plant based) protein source such as tofu), 1/4 of plate healthy starch or carb such as a whole grain or root vegetable       Eat less animal products, and choose healthy sources, such as healthy fish (e g  sardines, mackerel, herring, flounder, high quality salmon)  No more than the size of a pack of cards of animal products per day  Avoid all industrially produced (feedlot) red meat and poultry  Eat plenty of these key foods that support brain, nerve and mental health:  Avocados  Beans  Blueberries and other berries  Broccoli, Kale and other leafy greens are key: have a minimum of 1 serving a day in addition to other veggies  Extra-virgin olive oil  Flax seed (grind just prior to eating and toss on breakfast cereal, in salads)  Don't heat it because the omega-3 fatty acids are very delicate  Herbal teas: hibiscus, lemon balm, mint, etc   Fresh herbs & spices like cilantro, dill, rosemary, thyme, oregano, basil, mint, parsley; cinnamon, oregano, marjoram, allspice, saffron and others  Turmeric is especially anti-inflammatory - adding a small amount of black pepper will help its absorption  Many herbs and spices are rich in anti-oxidants  Nuts & seeds, e g  almonds, walnuts, adam seeds, pumpkin, sunflower seeds  A handful a day will give you essential fatty acids and minerals like magnesium  Whole grains: oats, buckwheat, millet, teff, sorghum, amaranth  Wallace Resendizman is the only grain that is a complete protein source and therefore is a great staple grain  Whole wheat in moderation is ok  AVOID all white flour products, as they are often largely empty calories deficient in micronutrients and are high in glycemic index that induce inflammation and over-eating  Sweet potatoes (yams) are preferred over white potatoes  AVOID or at least minimize all these:  Processed foods: chips, cookies, frozen dinners, white bread, bagels, pastries, sweets and similar bakery products   Fast food, fried foods   Avoid all fast food restaurants like [employer], [employer] [place], [place], [facility], [facility] take-out and similar cheap unhealthy food establishments  Processed and industrially produced meats: delatorre, salami, pastrami, hot dogs, luncheon meats are filled with saturated fat and sodium that induce nerve and brain inflammation and damage  Occasional free range poultry or grass fed bison or wild game meat like venison (e g  once or twice a week, 6 oz  -  the size of a pack of cards) is probably ok  Do not eat regular chicken, which is the main source of cholesterol in the standard American diet and a major contributor to obesity and nerve/brain inflammation  Butter or margarine  These are high in saturated or dangerous trans fats  Cheese is high in saturated fat  Use not at all or minimally, such as a little sprinkle of a highly flavored cheese on food  Don't eat any food in which cheese is a main ingredient  Sugary drinks such as soda  Also avoid artificially sweetened sodas, which confuse our energy balancing mechanism and disturb our gut microbiome  Excess alcohol  Don't drink at all if there is a contraindication to drinking such as alcohol use disorder, liver disease, etc , Men should never exceed two drinks per day, women no more than 1 drink per day  A drink = one small glass of wine, one 12 oz  Beer or one shot of hard liquor  Here are some resources to learn more about the MIND diet and improving brain and nerve health: The Mind diet cookbook 2021 by Jose Magdaleno  Brief intro then a year's work of nice recipes   The Alzheimer's Solution by Gibran Barcenas and Herman Crowe MD  Copyright 2017  Reviews healthy lifestyle as relates to brain health  Can use to help prevent/support healing of many neurological and mental health disorders, not just dementia  Has nice recipes in back of book  How not to Die by Sadi Strange MD Copyright 4482  Evidence based nutrition to help prevent and support healing of many diseases  Has a  Cookbook  Useful web site: NutritionFacts  org      Smoothie for brain & nerve health (could have one a day):  Place in :   About 1 cup or more of water, milk, almond or soy milk, adjust for proper consistency  1 cup fresh kale or spinach or other dark leafy green  1 cup berries such as blueberries, blackberries or raspberries  1 very ripe banana or other fruit like christopher, or other fruit of your choice  1 - 2 tablespoons of freshly ground flaxseed  1/4 cup nuts such as walnuts, almonds, hazelnuts, etc      Can also add per your preference:  Freshly ground Charli or other seeds  Various spices: a dash of nutmeg, 1/2 tsp or so of turmeric, cinnamon  cardamom, coriander, etc    Flavors such as vanilla, cocoa powder (with a little sugar, like 1 teaspoon for palatability if needed)    If you have diabetes, add 1/2 tsp of cinnamon and sip slowly over 1 hour to avoid a sugar rush  Have fun experimenting and adjusting to your taste! The MIND diet should be part of a comprehensive program to help support healing from your medical condition  Be sure this is under the guidance of your health care provider

## 2022-06-06 NOTE — PROGRESS NOTES
Assessment/Plan:      Diagnoses and all orders for this visit:    Anxiety  - Long history of anxiety and depression he has been taking Celexa but reports his anxiety is causing increased distress  - Answered all 3's on his BARBARA 7  - Complains mostly of somatic symptom anxiety where minor symptoms escalate to fear that he is dying    -     Patient open to adding new medication and encouraged to exercise to reinforce to himself a concept of his own health to his own  Patient also encouraged to continue reminding himself that so long as the symptom causing him anxiety is not in any way new, he is unlikely in grave harm but that there is nothing wrong with being safe and seeking treatment if he feels the need  -     buPROPion (WELLBUTRIN) 75 mg tablet; Take 1 tablet (75 mg total) by mouth daily at bedtime 1 tablet per day for 1 week then 2 tablet nightly      Obesity (BMI 30-39 9)  -    Recent complaint of pains causing anxiety over cardiac health  Had a negative work up in the ED but no recent lipid panel completed and patient is overweight  -     Lipid panel ordered          Subjective:     Patient ID: Alice Burton is a 39 y o  male  HPI    Long history of anxiety and depression he has been taking Celexa but reports his anxiety is causing increased distress  Answered all 3's on his BARBARA 7, he complains mostly of somatic symptom anxiety where minor symptoms escalate to fear that he is dying  Review of Systems   Constitutional: Negative for chills and fever  HENT: Negative for congestion, ear pain, hearing loss, sore throat and trouble swallowing  Eyes: Positive for visual disturbance  Negative for pain  Respiratory: Negative for cough and shortness of breath  Cardiovascular: Positive for chest pain (recent ED visit found to be noncardiac)  Negative for palpitations  Gastrointestinal: Negative for abdominal pain, nausea and vomiting  Genitourinary: Negative for dysuria and hematuria  Musculoskeletal: Negative for arthralgias and back pain  Skin: Negative for color change and rash  Neurological: Negative for dizziness, seizures, syncope and headaches  Psychiatric/Behavioral: The patient is nervous/anxious  All other systems reviewed and are negative  Objective:     Physical Exam  Constitutional:       General: He is not in acute distress  Appearance: Normal appearance  He is not ill-appearing or toxic-appearing  HENT:      Head: Normocephalic and atraumatic  Eyes:      Pupils: Pupils are equal, round, and reactive to light  Cardiovascular:      Rate and Rhythm: Tachycardia present  Heart sounds: Normal heart sounds  Pulmonary:      Effort: Pulmonary effort is normal       Breath sounds: Normal breath sounds  Abdominal:      Palpations: Abdomen is soft  Tenderness: There is no abdominal tenderness  Musculoskeletal:         General: Normal range of motion  Cervical back: Normal range of motion and neck supple  Skin:     General: Skin is warm and dry  Neurological:      Mental Status: He is alert and oriented to person, place, and time  Psychiatric:         Mood and Affect: Affect normal  Mood is anxious

## 2022-06-08 ENCOUNTER — CLINICAL SUPPORT (OUTPATIENT)
Dept: FAMILY MEDICINE CLINIC | Facility: CLINIC | Age: 37
End: 2022-06-08

## 2022-06-08 DIAGNOSIS — Z11.52 ENCOUNTER FOR SCREENING FOR COVID-19: Primary | ICD-10-CM

## 2022-06-08 PROCEDURE — U0003 INFECTIOUS AGENT DETECTION BY NUCLEIC ACID (DNA OR RNA); SEVERE ACUTE RESPIRATORY SYNDROME CORONAVIRUS 2 (SARS-COV-2) (CORONAVIRUS DISEASE [COVID-19]), AMPLIFIED PROBE TECHNIQUE, MAKING USE OF HIGH THROUGHPUT TECHNOLOGIES AS DESCRIBED BY CMS-2020-01-R: HCPCS | Performed by: STUDENT IN AN ORGANIZED HEALTH CARE EDUCATION/TRAINING PROGRAM

## 2022-06-08 PROCEDURE — U0005 INFEC AGEN DETEC AMPLI PROBE: HCPCS | Performed by: STUDENT IN AN ORGANIZED HEALTH CARE EDUCATION/TRAINING PROGRAM

## 2022-06-09 LAB — SARS-COV-2 RNA RESP QL NAA+PROBE: NEGATIVE

## 2022-06-22 ENCOUNTER — HOSPITAL ENCOUNTER (EMERGENCY)
Facility: HOSPITAL | Age: 37
Discharge: HOME | End: 2022-06-22
Attending: EMERGENCY MEDICINE
Payer: COMMERCIAL

## 2022-06-22 VITALS
DIASTOLIC BLOOD PRESSURE: 97 MMHG | OXYGEN SATURATION: 97 % | HEART RATE: 105 BPM | TEMPERATURE: 98 F | SYSTOLIC BLOOD PRESSURE: 185 MMHG | RESPIRATION RATE: 28 BRPM

## 2022-06-22 DIAGNOSIS — J02.9 PHARYNGITIS, ACUTE: Primary | ICD-10-CM

## 2022-06-22 LAB — S PYO DNA THROAT QL NAA+PROBE: NOT DETECTED

## 2022-06-22 PROCEDURE — 99284 EMERGENCY DEPT VISIT MOD MDM: CPT | Performed by: EMERGENCY MEDICINE

## 2022-06-22 PROCEDURE — 87651 STREP A DNA AMP PROBE: CPT | Performed by: EMERGENCY MEDICINE

## 2022-06-22 PROCEDURE — 99284 EMERGENCY DEPT VISIT MOD MDM: CPT

## 2022-06-22 RX ORDER — LIDOCAINE HYDROCHLORIDE 20 MG/ML
15 SOLUTION OROPHARYNGEAL ONCE
Status: COMPLETED | OUTPATIENT
Start: 2022-06-22 | End: 2022-06-22

## 2022-06-22 RX ORDER — MAGNESIUM HYDROXIDE/ALUMINUM HYDROXICE/SIMETHICONE 120; 1200; 1200 MG/30ML; MG/30ML; MG/30ML
30 SUSPENSION ORAL ONCE
Status: COMPLETED | OUTPATIENT
Start: 2022-06-22 | End: 2022-06-22

## 2022-06-22 RX ADMIN — ALUMINA, MAGNESIA, AND SIMETHICONE ORAL SUSPENSION REGULAR STRENGTH 30 ML: 1200; 1200; 120 SUSPENSION ORAL at 18:13

## 2022-06-22 RX ADMIN — LIDOCAINE HYDROCHLORIDE 15 ML: 20 SOLUTION ORAL; TOPICAL at 18:13

## 2022-06-22 NOTE — ED PROVIDER NOTES
History  Chief Complaint   Patient presents with    Shortness of Breath     Relates recently not sleeping c/o numerous complaints throat burning, tingling in mouth  No swelling noted, very anxious, tingling in nose  Recently started on welbutrin a week ago on celexa now for anxiety     Patient in the ED and initially complained of shortness of breath, however now states that he feels weird sensation in the right side of his throat  Symptoms began approximately 2:30 p m , when he awakened from a nap due to reflux symptoms  He felt anxious shortly afterwards, however anxiety has since resolved  Patient states that he now tastes medicine in the back of his throat  He last took Wellbutrin at 6:00 a m  Today, at which time he also ate some toast and a granola bar  He has not had anything to eat or drink since then  Patient has a prior medical history of hypertension, anxiety, GERD  History provided by:  Patient  History limited by:  Psychiatric disorder   used: No        Prior to Admission Medications   Prescriptions Last Dose Informant Patient Reported? Taking?    buPROPion (WELLBUTRIN) 75 mg tablet   No No   Sig: Take 1 tablet (75 mg total) by mouth daily at bedtime 1 tablet per day for 1 week then 2 tablet nightly   citalopram (CeleXA) 10 mg tablet   No No   Sig: Take 1 tablet (10 mg total) by mouth daily   fluticasone (FLONASE) 50 mcg/act nasal spray   No No   Si spray into each nostril daily   Patient not taking: No sig reported   lisinopril (ZESTRIL) 10 mg tablet   No No   Sig: Take 1 tablet (10 mg total) by mouth daily   omeprazole (PriLOSEC) 20 mg delayed release capsule  Self Yes No   Sig: Take 20 mg by mouth daily       Facility-Administered Medications: None       Past Medical History:   Diagnosis Date    Anxiety and depression     GERD (gastroesophageal reflux disease)     Psychiatric disorder        Past Surgical History:   Procedure Laterality Date    CHOLECYSTECTOMY     ME LAP,CHOLECYSTECTOMY N/A 2021    Procedure: CHOLECYSTECTOMY LAPAROSCOPIC;  Surgeon: oJrdy Collins MD;  Location: [facility];  Service: General    WISDOM TOOTH EXTRACTION         Family History   Problem Relation Age of Onset    Anxiety disorder Father     Hypertension Father      I have reviewed and agree with the history as documented  E-Cigarette/Vaping    E-Cigarette Use Never User      E-Cigarette/Vaping Substances    Nicotine No     THC No     CBD No     Flavoring No     Other No     Unknown No      Social History     Tobacco Use    Smoking status: Former Smoker     Years: 10 00     Quit date: 2016     Years since quittin 0    Smokeless tobacco: Current User     Types: Chew   Vaping Use    Vaping Use: Never used   Substance Use Topics    Alcohol use: No    Drug use: No       Review of Systems   Constitutional: Negative for chills and fever  HENT: Positive for sore throat  Negative for trouble swallowing  Respiratory: Positive for shortness of breath  Negative for cough and wheezing  Cardiovascular: Negative for chest pain and palpitations  Gastrointestinal: Negative for abdominal pain, constipation, diarrhea, nausea and vomiting  Genitourinary: Negative for dysuria, flank pain, hematuria and urgency  Musculoskeletal: Negative for back pain  Skin: Negative for color change and rash  All other systems reviewed and are negative  Physical Exam  Physical Exam  Vitals and nursing note reviewed  Constitutional:       Appearance: He is well-developed  HENT:      Head: Normocephalic and atraumatic  Mouth/Throat:      Pharynx: No pharyngeal swelling, oropharyngeal exudate or posterior oropharyngeal erythema  Tonsils: No tonsillar exudate or tonsillar abscesses  3+ on the right  3+ on the left  Eyes:      Pupils: Pupils are equal, round, and reactive to light  Cardiovascular:      Rate and Rhythm: Normal rate and regular rhythm        Heart sounds: Normal heart sounds  Pulmonary:      Effort: Pulmonary effort is normal  No tachypnea or accessory muscle usage  Breath sounds: Normal breath sounds  No decreased breath sounds or wheezing  Abdominal:      General: Bowel sounds are normal  There is no distension  Palpations: Abdomen is soft  There is no mass  Tenderness: There is no abdominal tenderness  There is no guarding or rebound  Skin:     General: Skin is warm and dry  Neurological:      Mental Status: He is alert and oriented to person, place, and time  Psychiatric:         Behavior: Behavior normal          Thought Content:  Thought content normal          Judgment: Judgment normal          Vital Signs  ED Triage Vitals   Temperature Pulse Respirations Blood Pressure SpO2   06/22/22 1535 06/22/22 1519 06/22/22 1519 06/22/22 1535 06/22/22 1519   98 °F (36 7 °C) (!) 110 17 (!) 185/97 98 %      Temp Source Heart Rate Source Patient Position - Orthostatic VS BP Location FiO2 (%)   06/22/22 1535 06/22/22 1535 06/22/22 1535 06/22/22 1535 --   Tympanic Monitor Sitting Right arm       Pain Score       06/22/22 1535       8           Vitals:    06/22/22 1519 06/22/22 1535   BP:  (!) 185/97   Pulse: (!) 110 105   Patient Position - Orthostatic VS:  Sitting         Visual Acuity      ED Medications  Medications   aluminum-magnesium hydroxide-simethicone (MYLANTA) oral suspension 30 mL (30 mL Oral Given 6/22/22 1813)   Lidocaine Viscous HCl (XYLOCAINE) 2 % mucosal solution 15 mL (15 mL Swish & Spit Given 6/22/22 1813)       Diagnostic Studies  Results Reviewed     Procedure Component Value Units Date/Time    Strep A PCR [[id number]]  (Normal) Collected: 06/22/22 1728    Lab Status: Final result Specimen: Throat Updated: 06/22/22 1805     STREP A PCR Not Detected                 No orders to display              Procedures  Procedures         ED Course                                             MDM  Number of Diagnoses or Management Options  Pharyngitis, acute: new and requires workup  Diagnosis management comments: Patient tolerating oral intake in the ER  Will discharge patient to home follow-up primary care physician  Risk of Complications, Morbidity, and/or Mortality  Presenting problems: high  Diagnostic procedures: high  Management options: high    Patient Progress  Patient progress: improved      Disposition  Final diagnoses:   Pharyngitis, acute     Time reflects when diagnosis was documented in both MDM as applicable and the Disposition within this note     Time User Action Codes Description Comment    6/22/2022  6:11 PM Luque Cramp Add [J02 9] Acute sore throat     6/22/2022  6:11 PM Luque Cramp Remove [J02 9] Acute sore throat     6/22/2022  6:11 PM Luque Cramp O Add [J02 9] Pharyngitis, acute       ED Disposition     ED Disposition   Discharge    Condition   Stable    Date/Time   Wed Jun 22, 2022  6:10 PM    [address] discharge to home/self care                 Follow-up Information     Follow up With Specialties Details Why Contact Info Additional Information    [facility] Schedule an appointment as soon as possible for a visit in 1 day for follow up [facility]  [address]  [address] [facility], [address] [place], [place], [address], [phone]          Discharge Medication List as of 6/22/2022  6:12 PM      CONTINUE these medications which have NOT CHANGED    Details   buPROPion (WELLBUTRIN) 75 mg tablet Take 1 tablet (75 mg total) by mouth daily at bedtime 1 tablet per day for 1 week then 2 tablet nightly, Starting Mon 6/6/2022, Normal      citalopram (CeleXA) 10 mg tablet Take 1 tablet (10 mg total) by mouth daily, Starting Fri 3/25/2022, Normal      fluticasone (FLONASE) 50 mcg/act nasal spray 1 spray into each nostril daily, Starting Sun 5/8/2022, Normal      lisinopril (ZESTRIL) 10 mg tablet Take 1 tablet (10 mg total) by mouth daily, Starting Mon 4/18/2022, Normal      omeprazole (PriLOSEC) 20 mg delayed release capsule Take 20 mg by mouth daily , Historical Med             No discharge procedures on file      PDMP Review     None          ED Provider  Electronically Signed by           Ghulam Phelps DO  06/24/22 8221

## 2022-06-27 ENCOUNTER — TELEPHONE (OUTPATIENT)
Dept: FAMILY MEDICINE CLINIC | Facility: CLINIC | Age: 37
End: 2022-06-27

## 2022-06-27 NOTE — TELEPHONE ENCOUNTER
Patient would like a call back to discuss possible side effects from Wellbutrin 75 mg which he recently statred  States he believes  that side effects are coming from combined medication of Celexa and Wellbutrin he feels like his throat is closing  He was seen in the ED regarding this and states he was told it was Acid Reflux  He would like a call back at the number listed below to discuss D/c medication        [phone] H. pylori infection    Type 2 diabetes mellitus without complication, without long-term current use of insulin

## 2022-07-04 ENCOUNTER — OFFICE VISIT (OUTPATIENT)
Dept: URGENT CARE | Facility: CLINIC | Age: 37
End: 2022-07-04
Payer: COMMERCIAL

## 2022-07-04 VITALS
RESPIRATION RATE: 18 BRPM | HEIGHT: 72 IN | OXYGEN SATURATION: 98 % | BODY MASS INDEX: 30.15 KG/M2 | WEIGHT: 222.6 LBS | TEMPERATURE: 98.6 F | DIASTOLIC BLOOD PRESSURE: 100 MMHG | HEART RATE: 101 BPM | SYSTOLIC BLOOD PRESSURE: 130 MMHG

## 2022-07-04 DIAGNOSIS — S05.02XA CORNEAL ABRASION, LEFT, INITIAL ENCOUNTER: Primary | ICD-10-CM

## 2022-07-04 PROCEDURE — 3075F SYST BP GE 130 - 139MM HG: CPT | Performed by: PHYSICIAN ASSISTANT

## 2022-07-04 PROCEDURE — 99203 OFFICE O/P NEW LOW 30 MIN: CPT | Performed by: PHYSICIAN ASSISTANT

## 2022-07-04 PROCEDURE — 3080F DIAST BP >= 90 MM HG: CPT | Performed by: PHYSICIAN ASSISTANT

## 2022-07-04 RX ORDER — ERYTHROMYCIN 5 MG/G
0.5 OINTMENT OPHTHALMIC
Qty: 3.5 G | Refills: 0 | Status: SHIPPED | OUTPATIENT
Start: 2022-07-04 | End: 2022-07-11

## 2022-07-04 NOTE — PROGRESS NOTES
[facility]        NAME: Talya Foreman is a 39 y o  male  : 1985    MRN: [id number]  DATE: 2022  TIME: 10:39 AM    Assessment and Plan   Corneal abrasion, left, initial encounter [S05  02XA]  1  Corneal abrasion, left, initial encounter  erythromycin (ILOTYCIN) ophthalmic ointment     Discussed strict return to care precautions as well as red flag symptoms which should prompt immediate ED referral  Pt verbalized understanding and is in agreement with plan  Please follow up with your primary care provider within the next week  Please remember that your visit today was with an urgent care provider and should not replace follow up with your primary care provider for chronic medical issues or annual physicals  Patient Instructions       Follow up with PCP in 3-5 days  Proceed to  ER if symptoms worsen  Chief Complaint     Chief Complaint   Patient presents with    Conjunctivitis     5 days ago - played video games continuously x 6 hours  Next day - eye irritation and admits he was rubbing L eye excessively  Saturday - awoke with L eye crusted shut, white/yellow exudate and sl  Red sclera with swelling  Used Visine  History of Present Illness       Pt pw L eye irritation x 2 days, crusting/matting since this morning  No visual changes  Was rubbing eyes excessively after playing video games for 6 hrs straight  Review of Systems   Review of Systems   Constitutional: Negative for chills, diaphoresis, fatigue and fever  HENT: Negative for congestion, ear pain, postnasal drip, rhinorrhea, sinus pain, sneezing, sore throat and trouble swallowing  Eyes: Positive for pain, discharge, redness and itching  Negative for photophobia and visual disturbance  Respiratory: Negative for cough, chest tightness, shortness of breath and wheezing  Cardiovascular: Negative for chest pain and leg swelling  Gastrointestinal: Negative for diarrhea, nausea and vomiting  Musculoskeletal: Negative for myalgias  Neurological: Negative for dizziness, weakness and headaches  Current Medications       Current Outpatient Medications:     citalopram (CeleXA) 10 mg tablet, Take 1 tablet (10 mg total) by mouth daily, Disp: 30 tablet, Rfl: 1    erythromycin (ILOTYCIN) ophthalmic ointment, Administer 0 5 inches into the left eye every 4 (four) hours for 7 days, Disp: 3 5 g, Rfl: 0    lisinopril (ZESTRIL) 10 mg tablet, Take 1 tablet (10 mg total) by mouth daily, Disp: 30 tablet, Rfl: 2    omeprazole (PriLOSEC) 20 mg delayed release capsule, Take 20 mg by mouth daily , Disp: , Rfl:     buPROPion (WELLBUTRIN) 75 mg tablet, Take 1 tablet (75 mg total) by mouth daily at bedtime 1 tablet per day for 1 week then 2 tablet nightly (Patient not taking: Reported on 7/4/2022), Disp: 45 tablet, Rfl: 0    Current Allergies     Allergies as of 07/04/2022 - Reviewed 07/04/2022   Allergen Reaction Noted    Wellbutrin [bupropion] Throat Swelling 07/04/2022            The following portions of the patient's history were reviewed and updated as appropriate: allergies, current medications, past family history, past medical history, past social history, past surgical history and problem list      Past Medical History:   Diagnosis Date    Anxiety     Anxiety and depression     Depression     GERD (gastroesophageal reflux disease)     Pneumonia     Psychiatric disorder        Past Surgical History:   Procedure Laterality Date    CHOLECYSTECTOMY  2021    NM LAP,CHOLECYSTECTOMY N/A 6/25/2021    Procedure: CHOLECYSTECTOMY LAPAROSCOPIC;  Surgeon: Aaron Rosenthal MD;  [facility];  Service: General    WISDOM TOOTH EXTRACTION         Family History   Problem Relation Age of Onset    Anxiety disorder Father     Hypertension Father          Medications have been verified          Objective   /100   Pulse 101   Temp 98 6 °F (37 °C)   Resp 18   Ht 6' (1 829 m)   Wt 101 kg (222 lb 9 6 oz)   SpO2 98%   BMI 30 19 kg/m²        Physical Exam     Physical Exam  Vitals and nursing note reviewed  Constitutional:       General: He is not in acute distress  Appearance: Normal appearance  He is not ill-appearing  HENT:      Head: Normocephalic and atraumatic  Eyes:      General: Lids are normal  Lids are everted, no foreign bodies appreciated  Left eye: Discharge present  Extraocular Movements:      Left eye: Normal extraocular motion and no nystagmus  Conjunctiva/sclera:      Left eye: Left conjunctiva is injected  Pupils:      Left eye: Corneal abrasion and fluorescein uptake present  Cardiovascular:      Rate and Rhythm: Normal rate  Pulmonary:      Effort: Pulmonary effort is normal  No respiratory distress  Skin:     General: Skin is warm and dry  Capillary Refill: Capillary refill takes less than 2 seconds  Neurological:      Mental Status: He is alert and oriented to person, place, and time     Psychiatric:         Behavior: Behavior normal

## 2022-07-25 DIAGNOSIS — I10 PRIMARY HYPERTENSION: ICD-10-CM

## 2022-07-25 RX ORDER — LISINOPRIL 10 MG/1
10 TABLET ORAL DAILY
Qty: 30 TABLET | Refills: 2 | Status: SHIPPED | OUTPATIENT
Start: 2022-07-25 | End: 2022-09-02 | Stop reason: SDUPTHER

## 2022-07-25 NOTE — TELEPHONE ENCOUNTER
Patient is out of his lisinopril medication and he scheduled an appointment with Dr Danielle Cortes for Tues 7/26

## 2022-09-02 ENCOUNTER — OFFICE VISIT (OUTPATIENT)
Dept: FAMILY MEDICINE CLINIC | Facility: CLINIC | Age: 37
End: 2022-09-02
Payer: COMMERCIAL

## 2022-09-02 VITALS
WEIGHT: 222 LBS | RESPIRATION RATE: 18 BRPM | OXYGEN SATURATION: 98 % | DIASTOLIC BLOOD PRESSURE: 90 MMHG | SYSTOLIC BLOOD PRESSURE: 126 MMHG | HEART RATE: 123 BPM | TEMPERATURE: 97.3 F | BODY MASS INDEX: 30.07 KG/M2 | HEIGHT: 72 IN

## 2022-09-02 DIAGNOSIS — K21.9 GASTROESOPHAGEAL REFLUX DISEASE, UNSPECIFIED WHETHER ESOPHAGITIS PRESENT: ICD-10-CM

## 2022-09-02 DIAGNOSIS — I10 PRIMARY HYPERTENSION: Primary | ICD-10-CM

## 2022-09-02 DIAGNOSIS — F41.9 ANXIETY: ICD-10-CM

## 2022-09-02 PROCEDURE — 99213 OFFICE O/P EST LOW 20 MIN: CPT | Performed by: FAMILY MEDICINE

## 2022-09-02 RX ORDER — LISINOPRIL 10 MG/1
10 TABLET ORAL DAILY
Qty: 90 TABLET | Refills: 2 | Status: SHIPPED | OUTPATIENT
Start: 2022-09-02

## 2022-09-04 PROBLEM — F41.9 ANXIETY: Status: ACTIVE | Noted: 2022-09-04

## 2022-09-04 PROBLEM — R23.2 FACIAL FLUSHING: Status: ACTIVE | Noted: 2022-09-04

## 2022-09-04 NOTE — ASSESSMENT & PLAN NOTE
Saw GI specialist about a year ago for chronic GERD  Was recommended EGD, but has not done so    · Referral to GI for EGD  · Discussed side effects of long-term PPI  · Encourage lifestyle modification for chronic GERD  · Can continue PPI only on as-needed basis until seen by GI

## 2022-09-04 NOTE — PROGRESS NOTES
[facility] Office visit    Assessment/Plan:     1  Primary hypertension  Assessment & Plan:  · Stable BP  Continue on lisinopril 10 mg daily  · Encourage dash diet and regular exercise    Orders:  -     lisinopril (ZESTRIL) 10 mg tablet; Take 1 tablet (10 mg total) by mouth daily    2  Anxiety  Assessment & Plan:  · Expressed anxiety related to his health, including HTN and different side effects from medications  · Patient had been on citalopram since around 2017 for anxiety  But he had tapered and now off of Celexa for the past 2 months  Could not tolerate side effects including "brain shock" or feeling like falling off a yesenia, sexual side effects  · Also tried Wellbutrin to augment Celexa, but it was discontinued due to throat swelling 10 days after starting on Wellbutrin  · Currently not on any medication for anxiety  · Discussed starting on BuSpar, but patient declined at this point  Patient stated had his anxiety could be related to moving out of his current place  He will follow-up with us after the move  3  Gastroesophageal reflux disease, unspecified whether esophagitis present  Assessment & Plan:  Saw GI specialist about a year ago for chronic GERD  Was recommended EGD, but has not done so  · Referral to GI for EGD  · Discussed side effects of long-term PPI  · Encourage lifestyle modification for chronic GERD  · Can continue PPI only on as-needed basis until seen by GI    Orders:  -     Ambulatory Referral to Gastroenterology; Future        Return in about 4 weeks (around 9/30/2022) for f/u anxiety  Subjective:   HPI  Chris Cameron is a 39 y o  male with HTN, anxiety, and chronic GERD presented for a BP check  He admitted that he had been anxious about having HTN  Family hx of HTN and dad, grandfather, and sister  He does not eat fast food  Now cutting down on salt and increasing vegetables  He and his partner are currently getting ready to move and cleaning the apartment  He reported having right facial flush for the past 5 days, usually in the morning, resolved after taking lisinopril  The following portions of the patient's history were reviewed and updated as appropriate: allergies, current medications, past family history, past medical history, past social history, past surgical history and problem list      Review of Systems   Constitutional: Negative for chills and fever  HENT: Negative for ear pain and sore throat  Eyes: Negative for pain and visual disturbance  Respiratory: Negative for cough and shortness of breath  Cardiovascular: Negative for chest pain and palpitations  Gastrointestinal: Negative for abdominal pain and vomiting  Genitourinary: Negative for dysuria and hematuria  Musculoskeletal: Negative for arthralgias and back pain  Skin: Negative for color change and rash  Neurological: Negative for dizziness, seizures, syncope and headaches  Psychiatric/Behavioral: The patient is nervous/anxious  All other systems reviewed and are negative  Objective:     /90   Pulse (!) 123   Temp (!) 97 3 °F (36 3 °C) (Tympanic)   Resp 18   Ht 6' (1 829 m)   Wt 101 kg (222 lb)   SpO2 98%   BMI 30 11 kg/m²      Remeasured pulse is 88 - taken manually  Physical Exam  Constitutional:       General: He is not in acute distress  Appearance: Normal appearance  HENT:      Head: Normocephalic and atraumatic  Eyes:      Pupils: Pupils are equal, round, and reactive to light  Cardiovascular:      Rate and Rhythm: Normal rate and regular rhythm  Pulses: Normal pulses  Heart sounds: Normal heart sounds  No murmur heard  Pulmonary:      Effort: Pulmonary effort is normal  No respiratory distress  Breath sounds: Normal breath sounds  No wheezing  Abdominal:      Palpations: Abdomen is soft  Skin:     General: Skin is warm and dry  Neurological:      General: No focal deficit present        Mental Status: He is alert and oriented to person, place, and time     Psychiatric:      Comments: anxiety        ** Please Note: This note has been constructed using a voice recognition system **     [address] MD  09/04/22  9:59 AM

## 2022-09-04 NOTE — ASSESSMENT & PLAN NOTE
· Expressed anxiety related to his health, including HTN and different side effects from medications  · Patient had been on citalopram since around 2017 for anxiety  But he had tapered and now off of Celexa for the past 2 months  Could not tolerate side effects including "brain shock" or feeling like falling off a yesenia, sexual side effects  · Also tried Wellbutrin to augment Celexa, but it was discontinued due to throat swelling 10 days after starting on Wellbutrin  · Currently not on any medication for anxiety  · Discussed starting on BuSpar, but patient declined at this point  Patient stated had his anxiety could be related to moving out of his current place  He will follow-up with us after the move

## 2022-09-19 ENCOUNTER — TELEPHONE (OUTPATIENT)
Dept: GASTROENTEROLOGY | Facility: CLINIC | Age: 37
End: 2022-09-19

## 2022-09-19 NOTE — TELEPHONE ENCOUNTER
Patients GI provider:  Dr Daniel Mark    Number to return call:  [phone]     Reason for call: Pt called and needs to schedule an EGD urgently he having a lot of issues please reach out to patient thank you     Scheduled procedure/appointment date if applicable: n/a

## 2022-09-21 ENCOUNTER — OFFICE VISIT (OUTPATIENT)
Dept: GASTROENTEROLOGY | Facility: CLINIC | Age: 37
End: 2022-09-21
Payer: COMMERCIAL

## 2022-09-21 VITALS
SYSTOLIC BLOOD PRESSURE: 132 MMHG | DIASTOLIC BLOOD PRESSURE: 92 MMHG | OXYGEN SATURATION: 96 % | TEMPERATURE: 98.2 F | HEIGHT: 72 IN | WEIGHT: 219 LBS | BODY MASS INDEX: 29.66 KG/M2 | HEART RATE: 104 BPM

## 2022-09-21 DIAGNOSIS — K21.9 GASTROESOPHAGEAL REFLUX DISEASE, UNSPECIFIED WHETHER ESOPHAGITIS PRESENT: ICD-10-CM

## 2022-09-21 DIAGNOSIS — K21.9 GASTROESOPHAGEAL REFLUX DISEASE WITHOUT ESOPHAGITIS: ICD-10-CM

## 2022-09-21 DIAGNOSIS — R10.13 EPIGASTRIC PAIN: Primary | ICD-10-CM

## 2022-09-21 DIAGNOSIS — R13.10 DYSPHAGIA, UNSPECIFIED TYPE: ICD-10-CM

## 2022-09-21 PROCEDURE — 99214 OFFICE O/P EST MOD 30 MIN: CPT | Performed by: INTERNAL MEDICINE

## 2022-09-21 PROCEDURE — 3075F SYST BP GE 130 - 139MM HG: CPT | Performed by: INTERNAL MEDICINE

## 2022-09-21 PROCEDURE — 3080F DIAST BP >= 90 MM HG: CPT | Performed by: INTERNAL MEDICINE

## 2022-09-21 RX ORDER — SUCRALFATE ORAL 1 G/10ML
1 SUSPENSION ORAL 4 TIMES DAILY
Qty: 414 ML | Refills: 1 | Status: SHIPPED | OUTPATIENT
Start: 2022-09-21

## 2022-09-21 RX ORDER — SUCRALFATE ORAL 1 G/10ML
1 SUSPENSION ORAL 4 TIMES DAILY
COMMUNITY
Start: 2022-09-19 | End: 2022-09-21 | Stop reason: SDUPTHER

## 2022-09-21 NOTE — PROGRESS NOTES
Follow up - [facility]  Teo Garrison 1985 male         Chief Complaint:  Follow-up from the ER    HPI:  Mr Shantell Lowry was seen in the ER couple of days ago because of some epigastric pain with radiation to the chest   Had negative cardiac workup and was advised to follow up with GI  He was given Carafate but he was too expensive and he never started  He was seen in the office last year but never scheduled for procedures  History of chronic acid reflux for which he takes omeprazole 20 mg daily with help  He reports trying different medications in the past   Good appetite, no recent weight loss  Regular bowel movements and denies any blood or mucus in the stool  Complaining about difficulty swallowing at times  Chaperon: Ms Kim Kaur: Review of Systems   Constitutional: Negative for activity change, appetite change, chills, diaphoresis, fatigue, fever and unexpected weight change  HENT: Negative for ear discharge, ear pain, facial swelling, hearing loss, nosebleeds, sore throat, tinnitus and voice change  Eyes: Negative for pain, discharge, redness, itching and visual disturbance  Respiratory: Negative for apnea, cough, chest tightness, shortness of breath and wheezing  Cardiovascular: Negative for chest pain and palpitations  Gastrointestinal:        As noted in HPI   Endocrine: Negative for cold intolerance, heat intolerance and polyuria  Genitourinary: Negative for difficulty urinating, dysuria, flank pain, hematuria and urgency  Musculoskeletal: Negative for arthralgias, back pain, gait problem, joint swelling and myalgias  Skin: Negative for rash and wound  Neurological: Negative for dizziness, tremors, seizures, speech difficulty, light-headedness, numbness and headaches  Hematological: Negative for adenopathy  Does not bruise/bleed easily  Psychiatric/Behavioral: Negative for agitation, behavioral problems and confusion   The patient is not nervous/anxious           Past Medical History:   Diagnosis Date    Anxiety     Anxiety and depression     Depression     GERD (gastroesophageal reflux disease)     Pneumonia     Psychiatric disorder       Past Surgical History:   Procedure Laterality Date    CHOLECYSTECTOMY      [place] LAP,CHOLECYSTECTOMY N/A 2021    Procedure: CHOLECYSTECTOMY LAPAROSCOPIC;  Surgeon: Stella Robledo MD;  Location: [place] [facility];  Service: General    WISDOM TOOTH EXTRACTION       Social History     Socioeconomic History    Marital status: /Civil Union     Spouse name: Not on file    Number of children: Not on file    Years of education: Not on file    Highest education level: Not on file   Occupational History    Not on file   Tobacco Use    Smoking status: Former Smoker     Years: 10 00     Quit date: 2016     Years since quittin 2    Smokeless tobacco: Current User     Types: Chew   Vaping Use    Vaping Use: Never used   Substance and Sexual Activity    Alcohol use: No    Drug use: No    Sexual activity: Yes     Partners: Female   Other Topics Concern    Not on file   Social History Narrative    Not on file     Social Determinants of Health     Financial Resource Strain: Not on file   Food Insecurity: Not on file   Transportation Needs: Not on file   Physical Activity: Not on file   Stress: Not on file   Social Connections: Not on file   Intimate Partner Violence: Not on file   Housing Stability: Not on file     Family History   Problem Relation Age of Onset    Anxiety disorder Father     Hypertension Father     Stomach cancer Sister     Dysphagia Sister      Wellbutrin [bupropion]  Current Outpatient Medications   Medication Sig Dispense Refill    lisinopril (ZESTRIL) 10 mg tablet Take 1 tablet (10 mg total) by mouth daily 90 tablet 2    omeprazole (PriLOSEC) 20 mg delayed release capsule Take 20 mg by mouth daily       sucralfate (CARAFATE) 1 g/10 mL suspension Take 1 g by mouth 4 (four) times a day Awaiting to speak to doctor       No current facility-administered medications for this visit  Blood pressure 132/92, pulse 104, temperature 98 2 °F (36 8 °C), height 6' (1 829 m), weight 99 3 kg (219 lb), SpO2 96 %  PHYSICAL EXAM: Physical Exam  Constitutional:       Appearance: He is well-developed  HENT:      Head: Normocephalic and atraumatic  Eyes:      General: No scleral icterus  Right eye: No discharge  Left eye: No discharge  Conjunctiva/sclera: Conjunctivae normal       Pupils: Pupils are equal, round, and reactive to light  Neck:      Thyroid: No thyromegaly  Vascular: No JVD  Trachea: No tracheal deviation  Cardiovascular:      Rate and Rhythm: Normal rate and regular rhythm  Heart sounds: Normal heart sounds  No murmur heard  No friction rub  No gallop  Pulmonary:      Effort: Pulmonary effort is normal  No respiratory distress  Breath sounds: Normal breath sounds  No wheezing or rales  Chest:      Chest wall: No tenderness  Abdominal:      General: Bowel sounds are normal  There is no distension  Palpations: Abdomen is soft  There is no mass  Tenderness: There is no abdominal tenderness  There is no guarding or rebound  Hernia: No hernia is present  Musculoskeletal:      Cervical back: Neck supple  Lymphadenopathy:      Cervical: No cervical adenopathy  Skin:     General: Skin is warm and dry  Findings: No erythema or rash  Neurological:      Mental Status: He is alert and oriented to person, place, and time  Psychiatric:         Behavior: Behavior normal          Thought Content:  Thought content normal           Lab Results   Component Value Date    WBC 12 88 (H) 05/10/2022    HGB 16 9 05/10/2022    HCT 51 9 (H) 05/10/2022    MCV 87 05/10/2022     05/10/2022     Lab Results   Component Value Date    CALCIUM 9 4 05/10/2022    K 3 7 05/10/2022    CO2 29 05/10/2022     05/10/2022    BUN 13 05/10/2022    CREATININE 0 98 05/10/2022       No results found for: INR, PROTIME    No results found  ASSESSMENT & PLAN:    Epigastric pain  Possible from peptic ulcer disease or gastric erosions  Other possibilities functional nonulcer dyspepsia or H pylori gastritis    -Patient was explained about the lifestyle and dietary modifications  Advised to avoid fatty foods, chocolates, caffeine, alcohol and any other triggering foods       -may increase omeprazole to twice daily    -schedule for EGD    -Patient was explained about  the risks and benefits of the procedure  Risks including but not limited to bleeding, infection, perforation were explained in detail  Also explained about less than 100% sensitivity with the exam and other alternatives  Dysphagia  Possible from peptic stricture especially with history of chronic GERD  Rule out eosinophilic esophagitis     -advised to chew well before swallowing    -EGD    Gastroesophageal reflux disease  Patient with history of chronic GERD and reports that only omeprazole helps with his symptoms    Rule out Elizondo's esophagus     -diet and lifestyle modifications as described above    -EGD

## 2022-09-21 NOTE — ASSESSMENT & PLAN NOTE
Patient with history of chronic GERD and reports that only omeprazole helps with his symptoms    Rule out Elizondo's esophagus     -diet and lifestyle modifications as described above    -EGD

## 2022-09-21 NOTE — ASSESSMENT & PLAN NOTE
Possible from peptic stricture especially with history of chronic GERD    Rule out eosinophilic esophagitis     -advised to chew well before swallowing    -EGD

## 2022-09-21 NOTE — ASSESSMENT & PLAN NOTE
Possible from peptic ulcer disease or gastric erosions  Other possibilities functional nonulcer dyspepsia or H pylori gastritis    -Patient was explained about the lifestyle and dietary modifications  Advised to avoid fatty foods, chocolates, caffeine, alcohol and any other triggering foods       -may increase omeprazole to twice daily    -schedule for EGD    -Patient was explained about  the risks and benefits of the procedure  Risks including but not limited to bleeding, infection, perforation were explained in detail  Also explained about less than 100% sensitivity with the exam and other alternatives

## 2022-09-21 NOTE — PATIENT INSTRUCTIONS
Scheduled date of EGD(as of today): 11/17/22  Physician performing EGD: Mey Morrison  Location of EGD: [facility]  Instructions reviewed with patient by: VIR[place]  Clearances: COVID TEST 11/11/22

## 2022-10-07 ENCOUNTER — OFFICE VISIT (OUTPATIENT)
Dept: FAMILY MEDICINE CLINIC | Facility: CLINIC | Age: 37
End: 2022-10-07
Payer: COMMERCIAL

## 2022-10-07 ENCOUNTER — TELEPHONE (OUTPATIENT)
Dept: PSYCHIATRY | Facility: CLINIC | Age: 37
End: 2022-10-07

## 2022-10-07 VITALS
HEIGHT: 72 IN | WEIGHT: 214.8 LBS | SYSTOLIC BLOOD PRESSURE: 130 MMHG | HEART RATE: 138 BPM | DIASTOLIC BLOOD PRESSURE: 100 MMHG | BODY MASS INDEX: 29.09 KG/M2 | TEMPERATURE: 97.6 F | OXYGEN SATURATION: 97 %

## 2022-10-07 DIAGNOSIS — F41.9 ANXIETY: Primary | ICD-10-CM

## 2022-10-07 PROBLEM — R23.2 FACIAL FLUSHING: Status: RESOLVED | Noted: 2022-09-04 | Resolved: 2022-10-07

## 2022-10-07 PROBLEM — R10.13 EPIGASTRIC PAIN: Status: RESOLVED | Noted: 2022-09-21 | Resolved: 2022-10-07

## 2022-10-07 PROCEDURE — 99213 OFFICE O/P EST LOW 20 MIN: CPT | Performed by: FAMILY MEDICINE

## 2022-10-07 RX ORDER — BUSPIRONE HYDROCHLORIDE 5 MG/1
5 TABLET ORAL 3 TIMES DAILY
Qty: 90 TABLET | Refills: 2 | Status: SHIPPED | OUTPATIENT
Start: 2022-10-07 | End: 2023-01-05

## 2022-10-07 NOTE — PROGRESS NOTES
Talya Foreman 1985 male MRN: [id number]    Family Medicine Acute Visit    ASSESSMENT/PLAN  1  Anxiety  · Discussed side effect profile  · Discussed follow up needed in 4 weeks for re-evaluation after starting medication  · Discussed daily centering methods such as prayer and reading a book  · Discussed healthy lifestyle changes such as exercise, healthy diet, avoidance of caffeine/energy drinks  · Will start on buspar 5 mg tid  · Will refer to behavioral therapy  - Ambulatory Referral to [facility]; Future  - busPIRone (BUSPAR) 5 mg tablet; Take 1 tablet (5 mg total) by mouth 3 (three) times a day  Dispense: 90 tablet; Refill: 2           Future Appointments   Date Time Provider Aracely Thomas   11/11/2022 10:40 AM Claribel Denson MD [facility]   11/17/2022 12:30 PM [place] [facility] ROOM 02 [place] [address] [facility]          SUBJECTIVE  CC: Anxiety (C/O stomach issues)      HPI:  Talya Foreman is a 39 y o  male who presents for    Anxiety  Presents for follow-up visit  Symptoms include excessive worry, nausea, nervous/anxious behavior, obsessions and panic  Patient reports no depressed mood, hyperventilation, malaise, palpitations, shortness of breath or suicidal ideas  denies suicidal/homicidal ideation/plan  Used to be on celexa but he mentioned that it did not work  Used to be on wellbutrin but he developed intolerable side effects and discontinued it  He was offered buspar at his last OV but he wanted to wait until the other medications were out of his system  He wants to try buspar today  Review of Systems   Respiratory: Negative for shortness of breath  Cardiovascular: Negative for palpitations  Gastrointestinal: Positive for nausea  Psychiatric/Behavioral: Negative for suicidal ideas  The patient is nervous/anxious          Historical Information   The patient history was reviewed as follows:  Past Medical History:   Diagnosis Date    Anxiety     Anxiety and depression     Depression     GERD (gastroesophageal reflux disease)     Pneumonia     Psychiatric disorder          Past Surgical History:   Procedure Laterality Date    CHOLECYSTECTOMY      [place] LAP,CHOLECYSTECTOMY N/A 2021    Procedure: CHOLECYSTECTOMY LAPAROSCOPIC;  Surgeon: Alma Rosa Lawrence MD;  Location: [place] [facility];  Service: General    WISDOM TOOTH EXTRACTION       Family History   Problem Relation Age of Onset    Anxiety disorder Father     Hypertension Father     Stomach cancer Sister     Dysphagia Sister       Social History   Social History     Substance and Sexual Activity   Alcohol Use Never     Social History     Substance and Sexual Activity   Drug Use Never     Social History     Tobacco Use   Smoking Status Former Smoker    Years: 10     Quit date: 2016    Years since quittin 2   Smokeless Tobacco Current User    Types: Chew       Medications:     Current Outpatient Medications:     busPIRone (BUSPAR) 5 mg tablet, Take 1 tablet (5 mg total) by mouth 3 (three) times a day, Disp: 90 tablet, Rfl: 2    lisinopril (ZESTRIL) 10 mg tablet, Take 1 tablet (10 mg total) by mouth daily, Disp: 90 tablet, Rfl: 2    omeprazole (PriLOSEC) 20 mg delayed release capsule, Take 20 mg by mouth daily , Disp: , Rfl:     sucralfate (CARAFATE) 1 g/10 mL suspension, Take 10 mL (1 g total) by mouth 4 (four) times a day Awaiting to speak to doctor, Disp: 414 mL, Rfl: 1    Allergies   Allergen Reactions    Wellbutrin [Bupropion] Throat Swelling     Day 10 after starting new med       OBJECTIVE  Vitals:   Vitals:    10/07/22 1005   BP: 130/100   Pulse: (!) 138   Temp: 97 6 °F (36 4 °C)   SpO2: 97%   Weight: 97 4 kg (214 lb 12 8 oz)   Height: 6' (1 829 m)         Physical Exam  Vitals reviewed  Constitutional:       General: He is awake  He is not in acute distress  Cardiovascular:      Rate and Rhythm: Normal rate and regular rhythm        Heart sounds: Normal heart sounds, S1 normal and S2 normal    Pulmonary:      Effort: Pulmonary effort is normal  No respiratory distress  Breath sounds: Normal breath sounds and air entry  No decreased breath sounds, wheezing, rhonchi or rales  Neurological:      Mental Status: He is alert and easily aroused  Psychiatric:         Attention and Perception: Attention normal          Mood and Affect: Mood is anxious  Speech: Speech is rapid and pressured  Behavior: Behavior normal  Behavior is cooperative  Thought Content: Thought content normal  Thought content does not include suicidal ideation  Thought content does not include homicidal or suicidal plan           Cognition and Memory: Cognition normal             Rudy Medrano 1936 Family Medicine   10/7/2022

## 2022-10-24 ENCOUNTER — HOSPITAL ENCOUNTER (EMERGENCY)
Facility: HOSPITAL | Age: 37
End: 2022-10-25
Attending: EMERGENCY MEDICINE
Payer: COMMERCIAL

## 2022-10-24 ENCOUNTER — APPOINTMENT (EMERGENCY)
Dept: CT IMAGING | Facility: HOSPITAL | Age: 37
End: 2022-10-24
Payer: COMMERCIAL

## 2022-10-24 ENCOUNTER — TELEPHONE (OUTPATIENT)
Dept: FAMILY MEDICINE CLINIC | Facility: CLINIC | Age: 37
End: 2022-10-24

## 2022-10-24 DIAGNOSIS — R93.5 ABNORMAL ABDOMINAL CT SCAN: ICD-10-CM

## 2022-10-24 DIAGNOSIS — M79.5 FOREIGN BODY (FB) IN SOFT TISSUE: Primary | ICD-10-CM

## 2022-10-24 LAB
ALBUMIN SERPL BCP-MCNC: 4.6 G/DL (ref 3.5–5)
ALP SERPL-CCNC: 63 U/L (ref 34–104)
ALT SERPL W P-5'-P-CCNC: 50 U/L (ref 7–52)
ANION GAP SERPL CALCULATED.3IONS-SCNC: 7 MMOL/L (ref 4–13)
AST SERPL W P-5'-P-CCNC: 19 U/L (ref 13–39)
BASOPHILS # BLD AUTO: 0.03 THOUSANDS/ÂΜL (ref 0–0.1)
BASOPHILS NFR BLD AUTO: 0 % (ref 0–1)
BILIRUB SERPL-MCNC: 0.48 MG/DL (ref 0.2–1)
BUN SERPL-MCNC: 7 MG/DL (ref 5–25)
CALCIUM SERPL-MCNC: 9.6 MG/DL (ref 8.4–10.2)
CHLORIDE SERPL-SCNC: 103 MMOL/L (ref 96–108)
CO2 SERPL-SCNC: 28 MMOL/L (ref 21–32)
CREAT SERPL-MCNC: 0.98 MG/DL (ref 0.6–1.3)
EOSINOPHIL # BLD AUTO: 0.08 THOUSAND/ÂΜL (ref 0–0.61)
EOSINOPHIL NFR BLD AUTO: 1 % (ref 0–6)
ERYTHROCYTE [DISTWIDTH] IN BLOOD BY AUTOMATED COUNT: 12.9 % (ref 11.6–15.1)
GFR SERPL CREATININE-BSD FRML MDRD: 97 ML/MIN/1.73SQ M
GLUCOSE SERPL-MCNC: 93 MG/DL (ref 65–140)
HCT VFR BLD AUTO: 47 % (ref 36.5–49.3)
HGB BLD-MCNC: 15.2 G/DL (ref 12–17)
IMM GRANULOCYTES # BLD AUTO: 0.03 THOUSAND/UL (ref 0–0.2)
IMM GRANULOCYTES NFR BLD AUTO: 0 % (ref 0–2)
LIPASE SERPL-CCNC: 28 U/L (ref 11–82)
LYMPHOCYTES # BLD AUTO: 2.32 THOUSANDS/ÂΜL (ref 0.6–4.47)
LYMPHOCYTES NFR BLD AUTO: 24 % (ref 14–44)
MCH RBC QN AUTO: 28.6 PG (ref 26.8–34.3)
MCHC RBC AUTO-ENTMCNC: 32.3 G/DL (ref 31.4–37.4)
MCV RBC AUTO: 89 FL (ref 82–98)
MONOCYTES # BLD AUTO: 0.55 THOUSAND/ÂΜL (ref 0.17–1.22)
MONOCYTES NFR BLD AUTO: 6 % (ref 4–12)
NEUTROPHILS # BLD AUTO: 6.73 THOUSANDS/ÂΜL (ref 1.85–7.62)
NEUTS SEG NFR BLD AUTO: 69 % (ref 43–75)
NRBC BLD AUTO-RTO: 0 /100 WBCS
PLATELET # BLD AUTO: 265 THOUSANDS/UL (ref 149–390)
PMV BLD AUTO: 9.9 FL (ref 8.9–12.7)
POTASSIUM SERPL-SCNC: 4 MMOL/L (ref 3.5–5.3)
PROT SERPL-MCNC: 7.5 G/DL (ref 6.4–8.4)
RBC # BLD AUTO: 5.31 MILLION/UL (ref 3.88–5.62)
SODIUM SERPL-SCNC: 138 MMOL/L (ref 135–147)
WBC # BLD AUTO: 9.74 THOUSAND/UL (ref 4.31–10.16)

## 2022-10-24 PROCEDURE — 83690 ASSAY OF LIPASE: CPT | Performed by: PHYSICIAN ASSISTANT

## 2022-10-24 PROCEDURE — 85025 COMPLETE CBC W/AUTO DIFF WBC: CPT | Performed by: PHYSICIAN ASSISTANT

## 2022-10-24 PROCEDURE — 93005 ELECTROCARDIOGRAM TRACING: CPT

## 2022-10-24 PROCEDURE — 80053 COMPREHEN METABOLIC PANEL: CPT | Performed by: PHYSICIAN ASSISTANT

## 2022-10-24 PROCEDURE — 36415 COLL VENOUS BLD VENIPUNCTURE: CPT | Performed by: PHYSICIAN ASSISTANT

## 2022-10-24 PROCEDURE — 74177 CT ABD & PELVIS W/CONTRAST: CPT

## 2022-10-24 RX ORDER — MAGNESIUM HYDROXIDE/ALUMINUM HYDROXICE/SIMETHICONE 120; 1200; 1200 MG/30ML; MG/30ML; MG/30ML
30 SUSPENSION ORAL ONCE
Status: COMPLETED | OUTPATIENT
Start: 2022-10-24 | End: 2022-10-24

## 2022-10-24 RX ORDER — FAMOTIDINE 10 MG/ML
40 INJECTION, SOLUTION INTRAVENOUS ONCE
Status: COMPLETED | OUTPATIENT
Start: 2022-10-24 | End: 2022-10-24

## 2022-10-24 RX ORDER — MAGNESIUM HYDROXIDE/ALUMINUM HYDROXICE/SIMETHICONE 120; 1200; 1200 MG/30ML; MG/30ML; MG/30ML
30 SUSPENSION ORAL ONCE
Status: COMPLETED | OUTPATIENT
Start: 2022-10-24 | End: 2022-10-25

## 2022-10-24 RX ORDER — LIDOCAINE HYDROCHLORIDE 20 MG/ML
15 SOLUTION OROPHARYNGEAL ONCE
Status: COMPLETED | OUTPATIENT
Start: 2022-10-24 | End: 2022-10-24

## 2022-10-24 RX ADMIN — IOHEXOL 100 ML: 350 INJECTION, SOLUTION INTRAVENOUS at 21:05

## 2022-10-24 RX ADMIN — LIDOCAINE HYDROCHLORIDE 15 ML: 20 SOLUTION ORAL; TOPICAL at 20:28

## 2022-10-24 RX ADMIN — FAMOTIDINE 40 MG: 10 INJECTION, SOLUTION INTRAVENOUS at 20:29

## 2022-10-24 RX ADMIN — ALUMINUM HYDROXIDE, MAGNESIUM HYDROXIDE, AND SIMETHICONE 30 ML: 200; 200; 20 SUSPENSION ORAL at 20:28

## 2022-10-24 RX ADMIN — MORPHINE SULFATE 2 MG: 2 INJECTION, SOLUTION INTRAMUSCULAR; INTRAVENOUS at 20:29

## 2022-10-24 RX ADMIN — SODIUM CHLORIDE 1000 ML: 0.9 INJECTION, SOLUTION INTRAVENOUS at 20:27

## 2022-10-24 NOTE — Clinical Note
Case was discussed with Dulce Gerardo and the patient's admission status was agreed to be Admission Status: inpatient status to the service of Dr Nhung Richey

## 2022-10-25 ENCOUNTER — APPOINTMENT (OUTPATIENT)
Dept: GASTROENTEROLOGY | Facility: HOSPITAL | Age: 37
End: 2022-10-25
Payer: COMMERCIAL

## 2022-10-25 ENCOUNTER — ANESTHESIA EVENT (OUTPATIENT)
Dept: GASTROENTEROLOGY | Facility: HOSPITAL | Age: 37
End: 2022-10-25
Payer: COMMERCIAL

## 2022-10-25 ENCOUNTER — HOSPITAL ENCOUNTER (OUTPATIENT)
Facility: HOSPITAL | Age: 37
Setting detail: OBSERVATION
LOS: 1 days | Discharge: HOME | End: 2022-10-26
Attending: INTERNAL MEDICINE | Admitting: INTERNAL MEDICINE
Payer: COMMERCIAL

## 2022-10-25 ENCOUNTER — ANESTHESIA (OUTPATIENT)
Dept: GASTROENTEROLOGY | Facility: HOSPITAL | Age: 37
End: 2022-10-25
Payer: COMMERCIAL

## 2022-10-25 VITALS
TEMPERATURE: 98.6 F | HEART RATE: 91 BPM | SYSTOLIC BLOOD PRESSURE: 130 MMHG | WEIGHT: 214 LBS | OXYGEN SATURATION: 97 % | HEIGHT: 72 IN | BODY MASS INDEX: 28.99 KG/M2 | RESPIRATION RATE: 16 BRPM | DIASTOLIC BLOOD PRESSURE: 81 MMHG

## 2022-10-25 DIAGNOSIS — F41.9 ANXIETY: ICD-10-CM

## 2022-10-25 DIAGNOSIS — K21.9 GASTROESOPHAGEAL REFLUX DISEASE, UNSPECIFIED WHETHER ESOPHAGITIS PRESENT: ICD-10-CM

## 2022-10-25 DIAGNOSIS — T18.9XXA FOREIGN BODY IN DIGESTIVE TRACT, INITIAL ENCOUNTER: Primary | ICD-10-CM

## 2022-10-25 DIAGNOSIS — R13.10 DYSPHAGIA, UNSPECIFIED TYPE: ICD-10-CM

## 2022-10-25 PROBLEM — R10.9 ABDOMINAL PAIN: Status: ACTIVE | Noted: 2022-10-25

## 2022-10-25 LAB
ATRIAL RATE: 100 BPM
P AXIS: 70 DEGREES
PR INTERVAL: 156 MS
QRS AXIS: 79 DEGREES
QRSD INTERVAL: 94 MS
QT INTERVAL: 354 MS
QTC INTERVAL: 456 MS
T WAVE AXIS: 68 DEGREES
VENTRICULAR RATE: 100 BPM

## 2022-10-25 PROCEDURE — 99220 PR INITIAL OBSERVATION CARE/DAY 70 MINUTES: CPT | Performed by: INTERNAL MEDICINE

## 2022-10-25 PROCEDURE — G0379 DIRECT REFER HOSPITAL OBSERV: HCPCS

## 2022-10-25 PROCEDURE — 93010 ELECTROCARDIOGRAM REPORT: CPT | Performed by: INTERNAL MEDICINE

## 2022-10-25 RX ORDER — LISINOPRIL 10 MG/1
10 TABLET ORAL DAILY
Status: DISCONTINUED | OUTPATIENT
Start: 2022-10-25 | End: 2022-10-26 | Stop reason: HOSPADM

## 2022-10-25 RX ORDER — SODIUM CHLORIDE, SODIUM GLUCONATE, SODIUM ACETATE, POTASSIUM CHLORIDE, MAGNESIUM CHLORIDE, SODIUM PHOSPHATE, DIBASIC, AND POTASSIUM PHOSPHATE .53; .5; .37; .037; .03; .012; .00082 G/100ML; G/100ML; G/100ML; G/100ML; G/100ML; G/100ML; G/100ML
125 INJECTION, SOLUTION INTRAVENOUS CONTINUOUS
Status: DISCONTINUED | OUTPATIENT
Start: 2022-10-25 | End: 2022-10-25

## 2022-10-25 RX ORDER — PROPOFOL 10 MG/ML
INJECTION, EMULSION INTRAVENOUS CONTINUOUS PRN
Status: DISCONTINUED | OUTPATIENT
Start: 2022-10-25 | End: 2022-10-25

## 2022-10-25 RX ORDER — FAMOTIDINE 10 MG/ML
20 INJECTION, SOLUTION INTRAVENOUS EVERY 12 HOURS SCHEDULED
Status: DISCONTINUED | OUTPATIENT
Start: 2022-10-25 | End: 2022-10-26 | Stop reason: HOSPADM

## 2022-10-25 RX ORDER — PROPOFOL 10 MG/ML
INJECTION, EMULSION INTRAVENOUS AS NEEDED
Status: DISCONTINUED | OUTPATIENT
Start: 2022-10-25 | End: 2022-10-25

## 2022-10-25 RX ORDER — PANTOPRAZOLE SODIUM 40 MG/1
40 TABLET, DELAYED RELEASE ORAL
Status: DISCONTINUED | OUTPATIENT
Start: 2022-10-25 | End: 2022-10-26 | Stop reason: HOSPADM

## 2022-10-25 RX ORDER — BUSPIRONE HYDROCHLORIDE 5 MG/1
5 TABLET ORAL 2 TIMES DAILY
Status: DISCONTINUED | OUTPATIENT
Start: 2022-10-25 | End: 2022-10-26 | Stop reason: HOSPADM

## 2022-10-25 RX ORDER — NICOTINE 21 MG/24HR
1 PATCH, TRANSDERMAL 24 HOURS TRANSDERMAL DAILY
Status: DISCONTINUED | OUTPATIENT
Start: 2022-10-25 | End: 2022-10-26 | Stop reason: HOSPADM

## 2022-10-25 RX ORDER — SODIUM CHLORIDE, SODIUM LACTATE, POTASSIUM CHLORIDE, CALCIUM CHLORIDE 600; 310; 30; 20 MG/100ML; MG/100ML; MG/100ML; MG/100ML
INJECTION, SOLUTION INTRAVENOUS CONTINUOUS PRN
Status: DISCONTINUED | OUTPATIENT
Start: 2022-10-25 | End: 2022-10-25

## 2022-10-25 RX ORDER — LIDOCAINE HYDROCHLORIDE 20 MG/ML
INJECTION, SOLUTION EPIDURAL; INFILTRATION; INTRACAUDAL; PERINEURAL AS NEEDED
Status: DISCONTINUED | OUTPATIENT
Start: 2022-10-25 | End: 2022-10-25

## 2022-10-25 RX ORDER — BUSPIRONE HYDROCHLORIDE 5 MG/1
5 TABLET ORAL 3 TIMES DAILY
Status: DISCONTINUED | OUTPATIENT
Start: 2022-10-25 | End: 2022-10-25

## 2022-10-25 RX ORDER — GLYCOPYRROLATE 0.2 MG/ML
INJECTION INTRAMUSCULAR; INTRAVENOUS AS NEEDED
Status: DISCONTINUED | OUTPATIENT
Start: 2022-10-25 | End: 2022-10-25

## 2022-10-25 RX ORDER — MAGNESIUM HYDROXIDE/ALUMINUM HYDROXICE/SIMETHICONE 120; 1200; 1200 MG/30ML; MG/30ML; MG/30ML
30 SUSPENSION ORAL ONCE
Status: COMPLETED | OUTPATIENT
Start: 2022-10-25 | End: 2022-10-25

## 2022-10-25 RX ADMIN — ALUMINUM HYDROXIDE, MAGNESIUM HYDROXIDE, AND DIMETHICONE 30 ML: 200; 20; 200 SUSPENSION ORAL at 00:15

## 2022-10-25 RX ADMIN — SODIUM CHLORIDE, SODIUM GLUCONATE, SODIUM ACETATE, POTASSIUM CHLORIDE, MAGNESIUM CHLORIDE, SODIUM PHOSPHATE, DIBASIC, AND POTASSIUM PHOSPHATE 125 ML/HR: .53; .5; .37; .037; .03; .012; .00082 INJECTION, SOLUTION INTRAVENOUS at 12:36

## 2022-10-25 RX ADMIN — PROPOFOL 150 MG: 10 INJECTION, EMULSION INTRAVENOUS at 15:24

## 2022-10-25 RX ADMIN — LIDOCAINE HYDROCHLORIDE 100 MG: 20 INJECTION, SOLUTION EPIDURAL; INFILTRATION; INTRACAUDAL; PERINEURAL at 15:24

## 2022-10-25 RX ADMIN — SODIUM CHLORIDE, SODIUM GLUCONATE, SODIUM ACETATE, POTASSIUM CHLORIDE, MAGNESIUM CHLORIDE, SODIUM PHOSPHATE, DIBASIC, AND POTASSIUM PHOSPHATE 125 ML/HR: .53; .5; .37; .037; .03; .012; .00082 INJECTION, SOLUTION INTRAVENOUS at 04:08

## 2022-10-25 RX ADMIN — PROPOFOL 90 MCG/KG/MIN: 10 INJECTION, EMULSION INTRAVENOUS at 15:28

## 2022-10-25 RX ADMIN — NICOTINE 1 PATCH: 14 PATCH, EXTENDED RELEASE TRANSDERMAL at 08:03

## 2022-10-25 RX ADMIN — BUSPIRONE HYDROCHLORIDE 5 MG: 5 TABLET ORAL at 08:04

## 2022-10-25 RX ADMIN — SODIUM CHLORIDE, SODIUM LACTATE, POTASSIUM CHLORIDE, AND CALCIUM CHLORIDE: .6; .31; .03; .02 INJECTION, SOLUTION INTRAVENOUS at 15:21

## 2022-10-25 RX ADMIN — FAMOTIDINE 20 MG: 10 INJECTION, SOLUTION INTRAVENOUS at 20:27

## 2022-10-25 RX ADMIN — GLYCOPYRROLATE 0.1 MG: 0.2 INJECTION, SOLUTION INTRAMUSCULAR; INTRAVENOUS at 15:24

## 2022-10-25 RX ADMIN — Medication 20 MG: at 15:24

## 2022-10-25 RX ADMIN — PROPOFOL 30 MG: 10 INJECTION, EMULSION INTRAVENOUS at 15:31

## 2022-10-25 RX ADMIN — BUSPIRONE HYDROCHLORIDE 5 MG: 5 TABLET ORAL at 17:19

## 2022-10-25 RX ADMIN — LISINOPRIL 10 MG: 10 TABLET ORAL at 17:19

## 2022-10-25 RX ADMIN — ALUMINUM HYDROXIDE, MAGNESIUM HYDROXIDE, AND SIMETHICONE 30 ML: 200; 200; 20 SUSPENSION ORAL at 06:42

## 2022-10-25 RX ADMIN — PANTOPRAZOLE SODIUM 40 MG: 40 TABLET, DELAYED RELEASE ORAL at 05:05

## 2022-10-25 RX ADMIN — FAMOTIDINE 20 MG: 10 INJECTION, SOLUTION INTRAVENOUS at 08:03

## 2022-10-25 NOTE — ANESTHESIA POSTPROCEDURE EVALUATION
Post-Op Assessment Note    CV Status:  Stable  Pain Score: 0    Pain management: adequate     Mental Status:  Awake and sleepy   Hydration Status:  Euvolemic   PONV Controlled:  Controlled   Airway Patency:  Patent and adequate      Post Op Vitals Reviewed: Yes      Staff: CRNA         No complications documented      BP   114/72   Temp     Pulse 92   Resp 20   SpO2 96

## 2022-10-25 NOTE — ED NOTES
Pt provided bag lunch per verbal of REJI Mathur; pt with no further needs at this time; call bell within reach and educated on use; will continue to monitor     Teri Ellis RN  10/25/22 8225

## 2022-10-25 NOTE — H&P
[facility]  H&P- Ella Saini 1985, 44 y o  male MRN: [id number]  Unit/Bed#: S -01 Encounter: [id number]  Primary Care Provider: Roxanna Murphy DO   Date and time admitted to hospital: 10/25/2022  3:31 AM    Abdominal pain  Assessment & Plan  · Presented to [facility] with abd pain x 3 months  · CTAP shows "Metallic density within the small bowel loops in the mid hemiabdomen which may represent ingested material versus changes cannot rule out foreign body"  · Pt reports swallowing dental crowns in the past  · Afebrile, no leukocytosis  · Lipase wnl, LFTs wnl  · ER discussed case with Surgery on call who recommended GI and Surgery consult given that FB has not passed on its own  · Keep NPO for possible intervention today  · IVF while NPO, supportive care  · Serial abd exams    Anxiety  Assessment & Plan  · Cont buspirone 5 mg, takes it BID (Rx TID)    Primary hypertension  Assessment & Plan  · BP acceptable, can continue lisinopril 10 mg daily    Gastroesophageal reflux disease  Assessment & Plan  · Cont PPI    VTE Prophylaxis: not indicated  / sequential compression device   Code Status: Full  POLST: POLST form is not discussed and not completed at this time  Discussion with family: none    Anticipated Length of Stay:  Patient will be admitted on an Observation basis with an anticipated length of stay of  < 2 midnights  Justification for Hospital Stay: Foreign body on CT pdg GI/surgery eval    Total Time for Visit, including Counseling / Coordination of Care: 45 minutes  Greater than 50% of this total time spent on direct patient counseling and coordination of care  Chief Complaint:   abd pain    History of Present Illness:    Ella Saini is a 44 y o  male with PMHx of anxiety, GERD, HTN who presents with 2-3 months of epigastric pain  Patient states he is had intermittent GERD her almost years now    He states that PPIs conventionally have worked for him until the past couple months  He was pending outpatient scope with GI until the pain became severe this weekend that he could not wait to follow with GI as an outpatient and presented to the hospital   He describes the sensation as “battery acid being poured down his throat”  He says he started feeling epigastric pressure that worked its way up to his esophagus  He does report 1 episode of nausea and vomiting after taking Carafate prescribed by [place] on a previous visit to the ER for the same however he now can tolerate Carafate if he takes it with meals and does report some relief  He notices the intensity of the pain increases a few hours after eating a meal   He also notices a improvement of his pain and GERD after cutting out dairy  Denies recent dairy ingestion  Denies fever or chills  Denies headache or dizziness  Does admit to some lightheadedness  He does report chest pain but believes this is epigastric pain radiating into his chest   He has some trouble taking a deep breath but relates this to his severe anxiety  Denies urinary symptoms  Denies flank pain  He cannot recall any recent ingestion of possible foreign body  He does state his teeth are in poor condition and he knows he needs to get this addressed  He does believe he has accidentally swallowed cracked teeth and crowns before  Review of Systems:    Review of Systems   Constitutional: Negative for activity change, appetite change, chills, diaphoresis, fatigue and fever  HENT: Positive for dental problem  Respiratory: Positive for chest tightness  Negative for shortness of breath  Cardiovascular: Positive for chest pain  Negative for leg swelling  Gastrointestinal: Positive for abdominal pain, nausea and vomiting  Negative for blood in stool, constipation and diarrhea  Genitourinary: Negative for dysuria, flank pain and frequency  Musculoskeletal: Negative for arthralgias, back pain and myalgias     Neurological: Positive for light-headedness  Negative for dizziness, weakness and headaches  Hematological: Negative  Psychiatric/Behavioral: The patient is nervous/anxious  Past Medical and Surgical History:     Past Medical History:   Diagnosis Date   • Anxiety    • Anxiety and depression    • Depression    • GERD (gastroesophageal reflux disease)    • Pneumonia    • Psychiatric disorder        Past Surgical History:   Procedure Laterality Date   • CHOLECYSTECTOMY     • [place] LAP,CHOLECYSTECTOMY N/A 2021    Procedure: CHOLECYSTECTOMY LAPAROSCOPIC;  Surgeon: Parmjit Enamorado MD;  Location: [place] [facility];  Service: General   • WISDOM TOOTH EXTRACTION         Meds/Allergies:    Prior to Admission medications    Medication Sig Start Date End Date Taking? Authorizing Provider   busPIRone (BUSPAR) 5 mg tablet Take 1 tablet (5 mg total) by mouth 3 (three) times a day 10/7/22 1/5/23  Swetha Wetzel MD   lisinopril (ZESTRIL) 10 mg tablet Take 1 tablet (10 mg total) by mouth daily 22   Malaika Ramos MD   omeprazole (PriLOSEC) 20 mg delayed release capsule Take 20 mg by mouth daily     Historical Provider, MD   sucralfate (CARAFATE) 1 g/10 mL suspension Take 10 mL (1 g total) by mouth 4 (four) times a day Awaiting to speak to doctor 22   Gary Hayden MD     I have reviewed home medications with patient personally  Allergies:    Allergies   Allergen Reactions   • Wellbutrin [Bupropion] Throat Swelling     Day 10 after starting new med       Social History:     Marital Status: /Civil Union   Occupation: not discussed  Patient Pre-hospital Living Situation: home  Patient Pre-hospital Level of Mobility: independent  Patient Pre-hospital Diet Restrictions: none  Substance Use History:   Social History     Substance and Sexual Activity   Alcohol Use Never     Social History     Tobacco Use   Smoking Status Former Smoker   • Years: 10 00   • Quit date: 2016   • Years since quittin 3   Smokeless Tobacco Current User   • Types: Chew     Social History     Substance and Sexual Activity   Drug Use Never       Family History:    non-contributory    Physical Exam:     Vitals:   Blood Pressure: 143/96 (10/25/22 0335)  Pulse: 93 (10/25/22 0335)  Temperature: 98 7 °F (37 1 °C) (10/25/22 0335)  Respirations: 20 (10/25/22 0335)  SpO2: 97 % (10/25/22 0335)    Physical Exam  Constitutional:       Appearance: Normal appearance  He is normal weight  He is not toxic-appearing or diaphoretic  Comments: Anxious appearing   HENT:      Head: Normocephalic and atraumatic  Right Ear: External ear normal       Left Ear: External ear normal       Mouth/Throat:      Mouth: Mucous membranes are moist       Pharynx: Oropharynx is clear  Comments: Poor dentition  Eyes:      Conjunctiva/sclera: Conjunctivae normal    Cardiovascular:      Rate and Rhythm: Regular rhythm  Tachycardia present  Heart sounds: Normal heart sounds  No murmur heard  No gallop  Pulmonary:      Effort: Pulmonary effort is normal  No respiratory distress  Breath sounds: Normal breath sounds  No stridor  No wheezing, rhonchi or rales  Abdominal:      General: Abdomen is flat  Bowel sounds are normal  There is no distension  Palpations: Abdomen is soft  There is no mass  Tenderness: There is abdominal tenderness  There is no guarding  Right CVA tenderness: periumbilical ttp  Hernia: No hernia is present  Musculoskeletal:      Cervical back: Normal range of motion  Right lower leg: No edema  Left lower leg: No edema  Skin:     General: Skin is warm and dry  Capillary Refill: Capillary refill takes less than 2 seconds  Coloration: Skin is not jaundiced or pale  Findings: No bruising, erythema or lesion  Neurological:      General: No focal deficit present  Mental Status: He is alert  Sensory: No sensory deficit  Motor: No weakness        Gait: Gait normal          Additional Data:     Lab Results: I have personally reviewed pertinent reports  Results from last 7 days   Lab Units 10/24/22  2024   WBC Thousand/uL 9 74   HEMOGLOBIN g/dL 15 2   HEMATOCRIT % 47 0   PLATELETS Thousands/uL 265   NEUTROS PCT % 69   LYMPHS PCT % 24   MONOS PCT % 6   EOS PCT % 1     Results from last 7 days   Lab Units 10/24/22  2024   SODIUM mmol/L 138   POTASSIUM mmol/L 4 0   CHLORIDE mmol/L 103   CO2 mmol/L 28   BUN mg/dL 7   CREATININE mg/dL 0 98   ANION GAP mmol/L 7   CALCIUM mg/dL 9 6   ALBUMIN g/dL 4 6   TOTAL BILIRUBIN mg/dL 0 48   ALK PHOS U/L 63   ALT U/L 50   AST U/L 19   GLUCOSE RANDOM mg/dL 93                       Imaging: I have personally reviewed pertinent reports  No orders to display       EKG, Pathology, and Other Studies Reviewed on Admission:   · EKG: NSR, 66 BPM    Allscripts / Epic Records Reviewed: Yes     ** Please Note: This note has been constructed using a voice recognition system   ** . . . .

## 2022-10-25 NOTE — CONSULTS
Acute Care Surgery  Consultation  Assessment:  44year old male with a history of GERD presents with worsening epigastric pain, incidental CT findings of metallic density within small bowel loops  Vital signs stable, afebrile  Labs WNL    Plan:  No acute surgical intervention indicated  Follow up GI recommendations -- Patient will likely require EGD for evaluation of prolonged and worsening gastritis symptoms with attempted FB extraction if able to access  If unable to retrieve, can monitor with serial imaging to assess movement through GI tract  FB appears non-obstructive and patient is clinically non-obstructed  History of Present Illness   HPI:  Siddharth Alanis is a 44 y o  male who presents with epigastric and worsening GERD symptoms  Patient states he has a history of GERD diagnosed 8 years ago that have worsened over the last 3 months with increasing episodes of pain that are lasting longer  He states these episodes last anywhere from a couple days to 3 weeks  He ws evaluated by GI outpatient with plan for scope but symptoms worsened in the interim  He states he has substernal pain that radiates toward his mouth and feels like a burning sensation  He reports associated bad taste in his mouth  He expresses concern for cancer  He denies fever, chills, nausea, vomiting, change in stool habits, blood in stool, dark stools, weight loss  Review of Systems   Constitutional: Negative for activity change, appetite change, chills, fever and unexpected weight change  HENT: Negative  Respiratory: Negative for chest tightness and shortness of breath  Cardiovascular: Negative for chest pain, palpitations and leg swelling  Gastrointestinal: Positive for abdominal pain  Negative for abdominal distention, constipation, nausea and vomiting  Genitourinary: Negative for difficulty urinating  Musculoskeletal: Negative for back pain and myalgias  Skin: Negative for color change and wound     Neurological: Negative for dizziness and weakness  Psychiatric/Behavioral: Negative for agitation, behavioral problems and confusion  Historical Information   Past Medical History:   Diagnosis Date   • Anxiety    • Anxiety and depression    • Depression    • GERD (gastroesophageal reflux disease)    • Pneumonia    • Psychiatric disorder      Past Surgical History:   Procedure Laterality Date   • CHOLECYSTECTOMY     • [place] LAP,CHOLECYSTECTOMY N/A 2021    Procedure: CHOLECYSTECTOMY LAPAROSCOPIC;  Surgeon: Eliana Jaramillo MD;  Location: [address] [place] [address];  Service: General   • WISDOM TOOTH EXTRACTION       Social History   Social History     Substance and Sexual Activity   Alcohol Use Never     Social History     Substance and Sexual Activity   Drug Use Never     Social History     Tobacco Use   Smoking Status Former Smoker   • Years: 10 00   • Quit date: 2016   • Years since quittin 3   Smokeless Tobacco Current User   • Types: Chew     Family History   Problem Relation Age of Onset   • Anxiety disorder Father    • Hypertension Father    • Stomach cancer Sister    • Dysphagia Sister        Meds/Allergies   all current active meds have been reviewed  Allergies   Allergen Reactions   • Wellbutrin [Bupropion] Throat Swelling     Day 10 after starting new med       Objective   First Vitals:   Blood Pressure: 143/96 (10/25/22 0335)  Pulse: 93 (10/25/22 0335)  Temperature: 98 7 °F (37 1 °C) (10/25/22 0335)  Respirations: 20 (10/25/22 0335)  SpO2: 97 % (10/25/22 0335)    Current Vitals:   Blood Pressure: 143/96 (10/25/22 0335)  Pulse: 93 (10/25/22 0335)  Temperature: 98 7 °F (37 1 °C) (10/25/22 0335)  Respirations: 20 (10/25/22 0335)  SpO2: 97 % (10/25/22 0335)    No intake or output data in the 24 hours ending 10/25/22 06    Invasive Devices  Report    Peripheral Intravenous Line  Duration           Peripheral IV 10/24/22 Right Antecubital <1 day                Physical Exam  Vitals and nursing note reviewed  Constitutional:       General: He is not in acute distress  Appearance: Normal appearance  He is not ill-appearing  HENT:      Head: Normocephalic and atraumatic  Nose: Nose normal       Mouth/Throat:      Mouth: Mucous membranes are moist       Pharynx: Oropharynx is clear  Eyes:      Conjunctiva/sclera: Conjunctivae normal       Pupils: Pupils are equal, round, and reactive to light  Cardiovascular:      Rate and Rhythm: Normal rate and regular rhythm  Pulmonary:      Effort: Pulmonary effort is normal  No respiratory distress  Abdominal:      General: Abdomen is flat  There is no distension  Palpations: Abdomen is soft  Tenderness: There is abdominal tenderness  There is no guarding or rebound  Musculoskeletal:      Cervical back: Normal range of motion and neck supple  Skin:     General: Skin is warm and dry  Coloration: Skin is not jaundiced  Neurological:      General: No focal deficit present  Mental Status: He is alert and oriented to person, place, and time  Mental status is at baseline  Psychiatric:         Mood and Affect: Mood normal          Behavior: Behavior normal          Thought Content: Thought content normal          Judgment: Judgment normal          Lab Results:   CBC:   Lab Results   Component Value Date    WBC 9 74 10/24/2022    HGB 15 2 10/24/2022    HCT 47 0 10/24/2022    MCV 89 10/24/2022     10/24/2022    MCH 28 6 10/24/2022    MCHC 32 3 10/24/2022    RDW 12 9 10/24/2022    MPV 9 9 10/24/2022    NRBC 0 10/24/2022   , CMP:   Lab Results   Component Value Date    SODIUM 138 10/24/2022    K 4 0 10/24/2022     10/24/2022    CO2 28 10/24/2022    BUN 7 10/24/2022    CREATININE 0 98 10/24/2022    CALCIUM 9 6 10/24/2022    AST 19 10/24/2022    ALT 50 10/24/2022    ALKPHOS 63 10/24/2022    EGFR 97 10/24/2022     Imaging: I have personally reviewed pertinent reports      EKG, Pathology, and Other Studies: I have personally reviewed pertinent films in PACS    Counseling / Coordination of Care  Total floor / unit time spent today 40 minutes  Greater than 50% of total time was spent with the patient and / or family counseling and / or coordination of care      Naye Romo  10/25/2022 6:13 AM

## 2022-10-25 NOTE — ANESTHESIA PREPROCEDURE EVALUATION
Procedure:  EGD    Relevant Problems   CARDIO   (+) Primary hypertension      GI/HEPATIC   (+) Dysphagia   (+) Gastroesophageal reflux disease      NEURO/PSYCH   (+) Anxiety     Chews tobacco     Physical Exam    Airway    Mallampati score: I  TM Distance: >3 FB  Neck ROM: full     Dental   Comment: Poor dentition, multiple broken teeth, multiple chips, pt denies that any teeth are loose ,     Cardiovascular      Pulmonary      Other Findings        Anesthesia Plan  ASA Score- 2     Anesthesia Type- IV sedation with anesthesia with ASA Monitors  Additional Monitors:   Airway Plan:           Plan Factors-    Chart reviewed  Patient summary reviewed  Induction- intravenous  Postoperative Plan-     Informed Consent- Anesthetic plan and risks discussed with patient  I personally reviewed this patient with the CRNA  Discussed and agreed on the Anesthesia Plan with the CRNA  Arline Ko

## 2022-10-25 NOTE — EMTALA/ACUTE CARE TRANSFER
[address] [facility] [address]  Dept: [phone]      EMTALA TRANSFER CONSENT    NAME Syed Knowles                                         1985                              MRN [id number]    I have been informed of my rights regarding examination, treatment, and transfer   by Dr Pelon Grove DO    Benefits: Patient preference (Bed capacity)    Risks: Potential for delay in receiving treatment, Potential deterioration of medical condition, Loss of IV, Increased discomfort during transfer, Possible worsening of condition or death during transfer      Transfer Request   I acknowledge that my medical condition has been evaluated and explained to me by the emergency department physician or other qualified medical person and/or my attending physician who has recommended and offered to me further medical examination and treatment  I understand the Hospital's obligation with respect to the treatment and stabilization of my emergency medical condition  I nevertheless request to be transferred  I release the Hospital, the doctor, and any other persons caring for me from all responsibility or liability for any injury or ill effects that may result from my transfer and agree to accept all responsibility for the consequences of my choice to transfer, rather than receive stabilizing treatment at the Hospital  I understand that because the transfer is my request, my insurance may not provide reimbursement for the services  The Hospital will assist and direct me and my family in how to make arrangements for transfer, but the hospital is not liable for any fees charged by the transport service  In spite of this understanding, I refuse to consent to further medical examination and treatment which has been offered to me, and request transfer to  [facility] V[facility] Name, [address] : [address] [facility]   I authorize the performance of emergency medical procedures and treatments upon me in both transit and upon arrival at the receiving facility  Additionally, I authorize the release of any and all medical records to the receiving facility and request they be transported with me, if possible  I authorize the performance of emergency medical procedures and treatments upon me in both transit and upon arrival at the receiving facility  Additionally, I authorize the release of any and all medical records to the receiving facility and request they be transported with me, if possible  I understand that the safest mode of transportation during a medical emergency is an ambulance and that the Hospital advocates the use of this mode of transport  Risks of traveling to the receiving facility by car, including absence of medical control, life sustaining equipment, such as oxygen, and medical personnel has been explained to me and I fully understand them  (SILVANA CORRECT BOX BELOW)  [  ]  I consent to the stated transfer and to be transported by ambulance/helicopter  [  ]  I consent to the stated transfer, but refuse transportation by ambulance and accept full responsibility for my transportation by car  I understand the risks of non-ambulance transfers and I exonerate the Hospital and its staff from any deterioration in my condition that results from this refusal     X___________________________________________    DATE  10/25/22  TIME________  Signature of patient or legally responsible individual signing on patient behalf           RELATIONSHIP TO PATIENT_________________________          Provider Certification    NAME Naun Self                                         1985                              MRN [id number]    A medical screening exam was performed on the above named patient  Based on the examination:    Condition Necessitating Transfer The primary encounter diagnosis was Foreign body (FB) in soft tissue   A diagnosis of Abnormal abdominal CT scan was also pertinent to this visit  Patient Condition: The patient has been stabilized such that within reasonable medical probability, no material deterioration of the patient condition or the condition of the unborn child(segun) is likely to result from the transfer, An emergency transfer is being made prior to stabilization due to the need for definitive care and the benefit of transfer outweighs the risk    Reason for Transfer: Level of Care needed not available at this facility, Patient/Family request    Transfer Requirements: [address]   · Space available and qualified personnel available for treatment as acknowledged by Jama Tan  · Agreed to accept transfer and to provide appropriate medical treatment as acknowledged by          · Appropriate medical records of the examination and treatment of the patient are provided at the time of transfer   [address], [address] _______  · Transfer will be performed by qualified personnel from    and appropriate transfer equipment as required, including the use of necessary and appropriate life support measures  Provider Certification: I have examined the patient and explained the following risks and benefits of being transferred/refusing transfer to the patient/family:         Based on these reasonable risks and benefits to the patient and/or the unborn child(segun), and based upon the information available at the time of the patient’s examination, I certify that the medical benefits reasonably to be expected from the provision of appropriate medical treatments at another medical facility outweigh the increasing risks, if any, to the individual’s medical condition, and in the case of labor to the unborn child, from effecting the transfer      X____________________________________________ DATE 10/25/22        TIME_______      ORIGINAL - SEND TO MEDICAL RECORDS   COPY - SEND WITH PATIENT DURING TRANSFER

## 2022-10-25 NOTE — ED PROVIDER NOTES
History  Chief Complaint   Patient presents with   • Abdominal Pain     Pt reports ongoing stomach pain  69-year-old male history of GERD anxiety presents accompanied by his wife complaining of abdominal pain  Patient reports that he has been suffering from GERD for the past 3 months  Patient reports that he is scheduled to see Gastroenterology for an endoscopy in November  Patient reports that he takes Prilosec and Carafate daily  Patient reports that it was helping his symptoms for a while however his symptoms yesterday significantly worsened  Patient reports a severe sharp, stabbing epigastric pain that radiates up through his chest into his esophagus  He reports that he also takes Tums and used to take Pepcid but no longer does  He reports he today rather bland diet although he does chew mint flavored tobacco daily  States his pain follows no specific pattern with eating  He states sometimes his pain will be worse after eating sometimes better after eating  Reports normal bowel movements  Last bowel movement was a few hours prior to arrival  He denies any fevers, chills, vomiting, melena hematochezia or any other complaints or concerns at this time  Prior to Admission Medications   Prescriptions Last Dose Informant Patient Reported?  Taking?   busPIRone (BUSPAR) 5 mg tablet   No No   Sig: Take 1 tablet (5 mg total) by mouth 3 (three) times a day   lisinopril (ZESTRIL) 10 mg tablet  Self No No   Sig: Take 1 tablet (10 mg total) by mouth daily   omeprazole (PriLOSEC) 20 mg delayed release capsule  Self Yes No   Sig: Take 20 mg by mouth daily    sucralfate (CARAFATE) 1 g/10 mL suspension   No No   Sig: Take 10 mL (1 g total) by mouth 4 (four) times a day Awaiting to speak to doctor      Facility-Administered Medications: None       Past Medical History:   Diagnosis Date   • Anxiety    • Anxiety and depression    • Depression    • GERD (gastroesophageal reflux disease)    • Pneumonia    • Psychiatric disorder        Past Surgical History:   Procedure Laterality Date   • CHOLECYSTECTOMY     • [place] LAP,CHOLECYSTECTOMY N/A 2021    Procedure: CHOLECYSTECTOMY LAPAROSCOPIC;  Surgeon: Nicolas Pickett MD;  Location: [facility];  Service: General   • WISDOM TOOTH EXTRACTION         Family History   Problem Relation Age of Onset   • Anxiety disorder Father    • Hypertension Father    • Stomach cancer Sister    • Dysphagia Sister      I have reviewed and agree with the history as documented  E-Cigarette/Vaping   • E-Cigarette Use Former User      E-Cigarette/Vaping Substances   • Nicotine No    • THC No    • CBD No    • Flavoring No    • Other No    • Unknown No      Social History     Tobacco Use   • Smoking status: Former Smoker     Years: 10 00     Quit date: 2016     Years since quittin 3   • Smokeless tobacco: Current User     Types: Chew   Vaping Use   • Vaping Use: Former   Substance Use Topics   • Alcohol use: Never   • Drug use: Never       Review of Systems   Constitutional: Negative for chills, fatigue and fever  HENT: Negative for congestion and sore throat  Eyes: Negative for pain  Respiratory: Negative for cough, chest tightness, shortness of breath and wheezing  Cardiovascular: Negative for chest pain, palpitations and leg swelling  Gastrointestinal: Positive for abdominal pain  Negative for constipation, diarrhea, nausea and vomiting  Endocrine: Negative for polyuria  Genitourinary: Negative for dysuria  Musculoskeletal: Negative for arthralgias, back pain, myalgias and neck pain  Skin: Negative for rash  Neurological: Negative for dizziness, syncope, light-headedness and headaches  All other systems reviewed and are negative  Physical Exam  Physical Exam  Vitals reviewed  Constitutional:       General: He is not in acute distress  Appearance: Normal appearance  He is well-developed  He is not toxic-appearing     HENT:      Head: Normocephalic and atraumatic  Mouth/Throat:      Mouth: Mucous membranes are moist    Eyes:      Conjunctiva/sclera: Conjunctivae normal    Cardiovascular:      Rate and Rhythm: Normal rate and regular rhythm  Heart sounds: Normal heart sounds  Pulmonary:      Effort: Pulmonary effort is normal       Breath sounds: Normal breath sounds  Abdominal:      General: Bowel sounds are normal       Palpations: Abdomen is soft  Tenderness: There is abdominal tenderness in the epigastric area  Musculoskeletal:         General: Normal range of motion  Cervical back: Normal range of motion  Skin:     General: Skin is warm and dry  Capillary Refill: Capillary refill takes less than 2 seconds  Neurological:      General: No focal deficit present  Mental Status: He is alert and oriented to person, place, and time     Psychiatric:         Mood and Affect: Mood normal          Behavior: Behavior normal          Vital Signs  ED Triage Vitals [10/24/22 1936]   Temperature Pulse Respirations Blood Pressure SpO2   98 2 °F (36 8 °C) (!) 110 18 151/89 98 %      Temp Source Heart Rate Source Patient Position - Orthostatic VS BP Location FiO2 (%)   Oral Monitor Sitting Left arm --      Pain Score       10 - Worst Possible Pain           Vitals:    10/24/22 1936 10/24/22 2315   BP: 151/89 138/78   Pulse: (!) 110 92   Patient Position - Orthostatic VS: Sitting Sitting         Visual Acuity      ED Medications  Medications   aluminum-magnesium hydroxide-simethicone (MYLANTA) oral suspension 30 mL (30 mL Oral Given 10/24/22 2028)   Famotidine (PF) (PEPCID) injection 40 mg (40 mg Intravenous Given 10/24/22 2029)   Lidocaine Viscous HCl (XYLOCAINE) 2 % mucosal solution 15 mL (15 mL Swish & Swallow Given 10/24/22 2028)   sodium chloride 0 9 % bolus 1,000 mL (1,000 mL Intravenous New Bag 10/24/22 2027)   morphine injection 2 mg (2 mg Intravenous Given 10/24/22 2029)   iohexol (OMNIPAQUE) 350 MG/ML injection (SINGLE-DOSE) 100 mL (100 mL Intravenous Given 10/24/22 2105)   aluminum-magnesium hydroxide-simethicone (MYLANTA) oral suspension 30 mL (30 mL Oral Given 10/25/22 0015)       Diagnostic Studies  Results Reviewed     Procedure Component Value Units Date/Time    CMP [[id number]] Collected: 10/24/22 2024    Lab Status: Final result Specimen: Blood from Arm, Right Updated: 10/24/22 2056     Sodium 138 mmol/L      Potassium 4 0 mmol/L      Chloride 103 mmol/L      CO2 28 mmol/L      ANION GAP 7 mmol/L      BUN 7 mg/dL      Creatinine 0 98 mg/dL      Glucose 93 mg/dL      Calcium 9 6 mg/dL      AST 19 U/L      ALT 50 U/L      Alkaline Phosphatase 63 U/L      Total Protein 7 5 g/dL      Albumin 4 6 g/dL      Total Bilirubin 0 48 mg/dL      eGFR 97 ml/min/1 73sq m     Narrative:      National Kid[employer] Disease Foundation guidelines for Chronic Kidney Disease (CKD):   •  Stage 1 with normal or high GFR (GFR > 90 mL/min/1 73 square meters)  •  Stage 2 Mild CKD (GFR = 60-89 mL/min/1 73 square meters)  •  Stage 3A Moderate CKD (GFR = 45-59 mL/min/1 73 square meters)  •  Stage 3B Moderate CKD (GFR = 30-44 mL/min/1 73 square meters)  •  Stage 4 Severe CKD (GFR = 15-29 mL/min/1 73 square meters)  •  Stage 5 End Stage CKD (GFR <15 mL/min/1 73 square meters)  Note: GFR calculation is accurate only with a steady state creatinine    Lipase [[id number]]  (Normal) Collected: 10/24/22 2024    Lab Status: Final result Specimen: Blood from Arm, Right Updated: 10/24/22 2056     Lipase 28 u/L     CBC and differential [[id number]] Collected: 10/24/22 2024    Lab Status: Final result Specimen: Blood from Arm, Right Updated: 10/24/22 2028     WBC 9 74 Thousand/uL      RBC 5 31 Million/uL      Hemoglobin 15 2 g/dL      Hematocrit 47 0 %      MCV 89 fL      MCH 28 6 pg      MCHC 32 3 g/dL      RDW 12 9 %      MPV 9 9 fL      Platelets 033 Thousands/uL      nRBC 0 /100 WBCs      Neutrophils Relative 69 %      Immat GRANS % 0 %      Lymphocytes Relative 24 % Monocytes Relative 6 %      Eosinophils Relative 1 %      Basophils Relative 0 %      Neutrophils Absolute 6 73 Thousands/µL      Immature Grans Absolute 0 03 Thousand/uL      Lymphocytes Absolute 2 32 Thousands/µL      Monocytes Absolute 0 55 Thousand/µL      Eosinophils Absolute 0 08 Thousand/µL      Basophils Absolute 0 03 Thousands/µL                  CT abdomen pelvis with contrast   Final Result by Richard Kimball DO (10/24 2134)      Metallic density within the small bowel loops in the mid hemiabdomen which may represent ingested material versus changes cannot rule out foreign body  The study was marked in [facility] for immediate notification  Workstation performed: [id number]                    Procedures  ECG 12 Lead Documentation Only    Date/Time: 10/24/2022 8:21 PM  Performed by: Danette Yap PA-C  Authorized by: Danette Yap PA-C     ECG reviewed by me, the ED Provider: yes    Patient location:  ED  Previous ECG:     Previous ECG:  Compared to current    Similarity:  No change    Comparison to cardiac monitor: Yes    Interpretation:     Interpretation: normal    Rate:     ECG rate:  66    ECG rate assessment: normal    Rhythm:     Rhythm: sinus rhythm    Ectopy:     Ectopy: none    QRS:     QRS axis:  Normal  Conduction:     Conduction: normal    ST segments:     ST segments:  Normal  T waves:     T waves: normal    Comments:      No evidence of acute cardiac ischemia             ED Course  ED Course as of 10/25/22 0024   Mon Oct 24, 2022   2252 CT abdomen pelvis with contrast  Result noted  Reached out to Dr Nakia Lepe of GI and Dr Zion Osborn of general surgery for their input   2319 CT abdomen pelvis with contrast  Result noted  Discussed this finding with the patient  He admits that he has swallowed multiple dental crowns  States "I have terrible teeth and that probably swallowed 2-3 teeth over the course of the past few years  I can not remember the last time I swallowed one now"  2320 Discussed case with Dr Demario Krishnan of general surgery  She states that there are no significant inflammatory changes within the bowel  No obstructive pattern therefore no emergent surgical intervention needed at this time  She recommends admission to Medicine for bowel rest, PPI and Gastroenterology/surgery consultation  Tue Oct 25, 2022   [address] Fleming from [address] [place] [address]  Number of Diagnoses or Management Options  Abnormal abdominal CT scan  Foreign body (FB) in soft tissue  Diagnosis management comments: Patient presented complaining of epigastric pain radiating up his chest into his throat  Longstanding history of GERD  Suspect this is likely cause of patient's symptoms  EKG without signs of ischemia  No significant risk factors  Patient only 44years old  Doubt ACS  Suspect likely cause of symptoms is GERD  Symptoms improved in the ED  Patient ran out metallic foreign body on CT  Suspect this is likely a dental crown on a tooth  Discussed case with surgery as seen in ED course that recommends admission as slim with GI and surgery consult  No bowel obstruction or inflammatory changes, no emergent intervention needed  Patient be transferred to [address] [place] [address] for patient preference and bed capacity reasons  Patient signed out to Dr Ruth Campos pending transport        Disposition  Final diagnoses:   Foreign body (FB) in soft tissue   Abnormal abdominal CT scan     Time reflects when diagnosis was documented in both MDM as applicable and the Disposition within this note     Time User Action Codes Description Comment    10/24/2022 11:22 PM Queenie Yadav Add [M79 5] Foreign body (FB) in soft tissue     10/24/2022 11:22 PM Queenie Yadav Add [R93 5] Abnormal abdominal CT scan       ED Disposition     ED Disposition   Transfer to Another Facility-In Network    Condition   --    Date/Time   Tue Oct 25, 2022 12:14 AM    Comment   Ella Saini should be [facility] to [facility]  MD Documentation    [address] Most Recent Value   Patient Condition The patient has been stabilized such that within reasonable medical probability, no material deterioration of the patient condition or the condition of the unborn child(segun) is likely to result from the transfer, An emergency transfer is being made prior to stabilization due to the need for definitive care and the benefit of transfer outweighs the risk   Reason for Transfer Level of Care needed not available at this facility, Patient/Family request   Benefits of Transfer Patient preference  [Bed capacity]   Risks of Transfer Potential for delay in receiving treatment, Potential deterioration of medical condition, Loss of IV, Increased discomfort during transfer, Possible worsening of condition or death during transfer   Accepting Facility Name, [facility]    (Name & Tel number) [facility]      RN Documentation    [address] Name, [facility]    (Name & Tel number) [facility]      Follow-up Information    None         Patient's Medications   Discharge Prescriptions    No medications on file       No discharge procedures on file      PDMP Review     None          ED Provider  Electronically Signed by           Arlin Roth PA-C  10/25/22 7346

## 2022-10-25 NOTE — CONSULTS
Consultation - [facility]) [facility]  Flavia Rosado 44 y o  male MRN: [id number]  Unit/Bed#: S -59 Encounter: [id number]        Inpatient consult to gastroenterology  Consult performed by: Radha Coulter DO  Consult ordered by: Darryl Ribeiro PA-C          Reason for Consult / Principal Problem: Foreign body ingestion    ASSESSMENT and PLAN:    Active Problems:    Gastroesophageal reflux disease    Primary hypertension    Anxiety    Abdominal pain    1  Foreign body ingestion  - presented with epigastric pain of 2-3 month duration  - reports that within the last 2 months he may have swallowed some cracked teeth and dental crowns  - he has a chronic history of GERD for which he takes omeprazole and Carafate daily  - CT abdomen and pelvis showed a metallic density within the small bowel loops in the mid hemiabdomen which may represent ingested material  - patient is scheduled for an outpatient EGD on November 9th  However patient does not want to wait that long for the upper endoscopy    - Patient's sister does have a history of eosinophilic esophagitis  - DDx for epigastric abdominal pain includes but is not limited to foreign body density within his small-bowel vs eosinophilic esophagitis vs worsening GERD vs gastritis vs peptic stricture      Plan:  - Plan for EGD today   - Will keep patient NPO with sips with meds until the EGD  - Continue Protonix 40 mg daily  - Continue famotidine 20 mg IV q 12 hours  - Continue to monitor bowel movements for passage of the foreign object  - May need serial imaging in the form of x-ray as an outpatient to monitor   progression of the foreign object  - Miralax daily as outpatient  - Remainder of management per primary team  -------------------------------------------------------------------------------------------------------------------    HPI:  24-year-old male with past medical history of anxiety, GERD, and hypertension presented to the emergency department with a chief complaint of abdominal pain  Patient states that he has been experiencing 2-3 months of epigastric abdominal pain  He has a chronic history of significant GERD for which he takes omeprazole and Carafate on a daily basis  He states his current symptoms are 10 times worse than his normal heartburn sensation  As of this past Saturday night, states that he has been experiencing significant epigastric pain and a burning sensation in his throat as if someone was pouring acid down his mouth  States that this pain is worse a few hours after meals  He reports that at least within the past 2 months he may have swallowed some cracked teeth and dental crowns prior to the worsening of his present symptoms  States that he has recently cutting out dairy from his diet, which has been benefiting him  He denies any changes to his diet  Denies any recent nausea and vomiting and hematemesis  Denies any melena and hematochezia  He endorses of a sensation of food being stuck in his throat, which he believes could be attributed to his tonsils  He would occasionally have to bring up the food on his own but other times the food would pass naturally after drinking some water  He denies halitosis  REVIEW OF SYSTEMS:    CONSTITUTIONAL: Denies any fever, chills, or rigors  Good appetite, and no recent weight loss  HEENT: No earache or tinnitus  Denies hearing loss or visual disturbances  CARDIOVASCULAR: No chest pain or palpitations  RESPIRATORY: Denies any cough, hemoptysis, shortness of breath or dyspnea on exertion  GASTROINTESTINAL: As noted in the History of Present Illness  GENITOURINARY: No problems with urination  Denies any hematuria or dysuria  NEUROLOGIC: No dizziness or vertigo, denies headaches  MUSCULOSKELETAL: Denies any muscle or joint pain  SKIN: Denies skin rashes or itching  ENDOCRINE: Denies excessive thirst  Denies intolerance to heat or cold    PSYCHOSOCIAL: Denies depression or anxiety  Denies any recent memory loss         Historical Information   Past Medical History:   Diagnosis Date   • Anxiety    • Anxiety and depression    • Depression    • GERD (gastroesophageal reflux disease)    • Pneumonia    • Psychiatric disorder      Past Surgical History:   Procedure Laterality Date   • CHOLECYSTECTOMY     • [place] LAP,CHOLECYSTECTOMY N/A 2021    Procedure: CHOLECYSTECTOMY LAPAROSCOPIC;  Surgeon: Radha Kumar MD;  Location: [facility];  Service: General   • WISDOM TOOTH EXTRACTION       Social History   Social History     Substance and Sexual Activity   Alcohol Use Never     Social History     Substance and Sexual Activity   Drug Use Never     Social History     Tobacco Use   Smoking Status Former Smoker   • Years: 10 00   • Quit date: 2016   • Years since quittin 3   Smokeless Tobacco Current User   • Types: Chew     Family History   Problem Relation Age of Onset   • Anxiety disorder Father    • Hypertension Father    • Stomach cancer Sister    • Dysphagia Sister        Meds/Allergies     Medications Prior to Admission   Medication   • busPIRone (BUSPAR) 5 mg tablet   • lisinopril (ZESTRIL) 10 mg tablet   • omeprazole (PriLOSEC) 20 mg delayed release capsule   • sucralfate (CARAFATE) 1 g/10 mL suspension     Current Facility-Administered Medications   Medication Dose Route Frequency   • busPIRone (BUSPAR) tablet 5 mg  5 mg Oral BID   • Famotidine (PF) (PEPCID) injection 20 mg  20 mg Intravenous Q12H [address]   • lisinopril (ZESTRIL) tablet 10 mg  10 mg Oral Daily   • multi-electrolyte (PLASMALYTE-A/ISOLYTE-S PH 7 4) IV solution  125 mL/hr Intravenous Continuous   • nicotine (NICODERM CQ) 14 mg/24hr TD 24 hr patch 1 patch  1 patch Transdermal Daily   • pantoprazole (PROTONIX) EC tablet 40 mg  40 mg Oral Early Morning       Allergies   Allergen Reactions   • Wellbutrin [Bupropion] Throat Swelling     Day 10 after starting new med           Objective     Blood pressure 124/80, pulse 68, temperature 97 7 °F (36 5 °C), resp  rate 16, SpO2 94 %  No intake or output data in the 24 hours ending 10/25/22 0926      PHYSICAL EXAM:      General Appearance:   Alert, cooperative, no distress, appears stated age    HEENT:   Normocephalic, atraumatic, anicteric, no oropharyngeal thrush present      Neck:  Supple, symmetrical, trachea midline, no adenopathy;    thyroid: no enlargement/tenderness/nodules; no carotid  bruit or JVD    Lungs:   Clear to auscultation bilaterally; no rales, rhonchi or wheezing; respirations unlabored    Heart[de-identified]   S1 and S2 normal; regular rate and rhythm; no murmur, rub, or gallop  Abdomen:   Soft, epigastric tenderness, non-distended; normal bowel sounds; no masses, no organomegaly    Genitalia:   Deferred    Rectal:   Deferred    Extremities:  No cyanosis, clubbing or edema    Pulses:  2+ and symmetric all extremities    Skin:  Skin color, texture, turgor normal, no rashes or lesions    Lymph nodes:  No palpable cervical, axillary or inguinal lymphadenopathy        Lab Results:   Results from last 7 days   Lab Units 10/24/22  2024   WBC Thousand/uL 9 74   HEMOGLOBIN g/dL 15 2   HEMATOCRIT % 47 0   PLATELETS Thousands/uL 265   NEUTROS PCT % 69   LYMPHS PCT % 24   MONOS PCT % 6   EOS PCT % 1     Results from last 7 days   Lab Units 10/24/22  2024   POTASSIUM mmol/L 4 0   CHLORIDE mmol/L 103   CO2 mmol/L 28   BUN mg/dL 7   CREATININE mg/dL 0 98   CALCIUM mg/dL 9 6   ALK PHOS U/L 63   ALT U/L 50   AST U/L 19         Results from last 7 days   Lab Units 10/24/22  2024   LIPASE u/L 28       Imaging Studies: I have personally reviewed pertinent imaging studies  No results found  Patient was seen and examined by Dr Girish Aiken  All banerjee medical decisions were made by Dr Narcisa Cardoza  Thank you for allowing us to participate in the care of this present patient  We will follow-up with you closely

## 2022-10-26 ENCOUNTER — TELEPHONE (OUTPATIENT)
Dept: FAMILY MEDICINE CLINIC | Facility: CLINIC | Age: 37
End: 2022-10-26

## 2022-10-26 VITALS
DIASTOLIC BLOOD PRESSURE: 89 MMHG | HEIGHT: 72 IN | TEMPERATURE: 98.5 F | BODY MASS INDEX: 28.99 KG/M2 | HEART RATE: 79 BPM | OXYGEN SATURATION: 96 % | WEIGHT: 214 LBS | RESPIRATION RATE: 18 BRPM | SYSTOLIC BLOOD PRESSURE: 128 MMHG

## 2022-10-26 DIAGNOSIS — S30.851A METAL FOREIGN BODY IN ABDOMEN: Primary | ICD-10-CM

## 2022-10-26 PROCEDURE — 99217 PR OBSERVATION CARE DISCHARGE MANAGEMENT: CPT | Performed by: INTERNAL MEDICINE

## 2022-10-26 RX ORDER — POLYETHYLENE GLYCOL 3350 17 G/17G
17 POWDER, FOR SOLUTION ORAL DAILY
Refills: 0
Start: 2022-10-26

## 2022-10-26 RX ORDER — LIDOCAINE HYDROCHLORIDE 20 MG/ML
15 SOLUTION OROPHARYNGEAL 4 TIMES DAILY PRN
Status: DISCONTINUED | OUTPATIENT
Start: 2022-10-26 | End: 2022-10-26 | Stop reason: HOSPADM

## 2022-10-26 RX ORDER — MAGNESIUM HYDROXIDE/ALUMINUM HYDROXICE/SIMETHICONE 120; 1200; 1200 MG/30ML; MG/30ML; MG/30ML
30 SUSPENSION ORAL EVERY 4 HOURS PRN
Status: DISCONTINUED | OUTPATIENT
Start: 2022-10-26 | End: 2022-10-26 | Stop reason: HOSPADM

## 2022-10-26 RX ORDER — NICOTINE 21 MG/24HR
1 PATCH, TRANSDERMAL 24 HOURS TRANSDERMAL DAILY
Qty: 28 PATCH | Refills: 0 | Status: SHIPPED | OUTPATIENT
Start: 2022-10-26

## 2022-10-26 RX ADMIN — BUSPIRONE HYDROCHLORIDE 5 MG: 5 TABLET ORAL at 07:55

## 2022-10-26 RX ADMIN — LIDOCAINE HYDROCHLORIDE 15 ML: 20 SOLUTION ORAL; TOPICAL at 07:56

## 2022-10-26 RX ADMIN — Medication 1 SPRAY: at 05:01

## 2022-10-26 RX ADMIN — FAMOTIDINE 20 MG: 10 INJECTION, SOLUTION INTRAVENOUS at 07:55

## 2022-10-26 RX ADMIN — ALUMINA, MAGNESIA, AND SIMETHICONE ORAL SUSPENSION REGULAR STRENGTH 30 ML: 1200; 1200; 120 SUSPENSION ORAL at 02:51

## 2022-10-26 RX ADMIN — PANTOPRAZOLE SODIUM 40 MG: 40 TABLET, DELAYED RELEASE ORAL at 05:01

## 2022-10-26 NOTE — DISCHARGE SUMMARY
[facility]  Discharge- Jj Kim 1985, 44 y o  male MRN: [id number]  Unit/Bed#: S -01 Encounter: [id number]  Primary Care Provider: Robbie Louis DO   Date and time admitted to hospital: 10/25/2022  3:31 AM    * Abdominal pain  Assessment & Plan  · Labs remained stable  · EGD noted positive for gastric polyps  · Continue medications for gastritis and GERD  · To follow-up with GI on outpatient basis for results of biopsy  · Of note patient reports family history of eosinophilic esophagitis    Primary hypertension  Assessment & Plan  · Continue outpatient regimen    Gastroesophageal reflux disease  Assessment & Plan  · Continue outpatient regimen      Medical Problems             Resolved Problems  Date Reviewed: 10/26/2022   None               Discharging Physician / Practitioner: Manfred Altamirano  PCP: Robbie Louis DO  Admission Date:   Admission Orders (From admission, onward)     Ordered        10/25/22 0356  Place in Observation  Once                      Discharge Date: 10/26/22    Consultations During Hospital Stay:  · GI  · General surgery      Procedures Performed:   · Endoscopy  Multiple polyps with pulling mucosa in the gastric region largest measuring 1 cm in size  Sent for biopsies as well as biopsies for H pylori  · CT scan  Metallic density with within the small bowel may represent ingested material     Significant Findings / Test Results:   · None    Incidental Findings:   · None     Test Results Pending at Discharge (will require follow up): · None     Outpatient Tests Requested:  · Abdominal x-ray in 1 week    Complications:  None    Reason for Admission:  Abdominal pain    Hospital Course:   Jj Kim is a 44 y o  male patient who originally presented to the hospital on 10/25/2022 due to abdominal pain  Patient was transferred from [facility] for abdominal pain with questionable retained foreign body    Patient reports he makes while described in the past   Was seen by surgery who believes this will pass on its own recommended repeat x-ray in 1 week with daily MiraLax  Patient was seen by GI known to have reflux reports burning in his throat  EGD was noted no esophagitis seen  Patient reports family history of eosinophilic esophagitis  Gastric polyps were noted for which biopsies were taken  Patient stable for discharge      Please see above list of diagnoses and related plan for additional information  Condition at Discharge: good    Discharge Day Visit / Exam:   Subjective:  No chest pain or shortness of breath  Vitals: Blood Pressure: 112/77 (10/25/22 2220)  Pulse: 78 (10/25/22 2220)  Temperature: 98 5 °F (36 9 °C) (10/25/22 2220)  Temp Source: Tympanic (10/25/22 2220)  Respirations: 16 (10/25/22 2220)  Height: 6' (182 9 cm) (10/25/22 1449)  Weight - Scale: 97 1 kg (214 lb) (10/25/22 1449)  SpO2: 98 % (10/25/22 1614)  Exam:   Physical Exam  Constitutional:       Appearance: He is obese  Cardiovascular:      Rate and Rhythm: Normal rate  Pulses: Normal pulses  Heart sounds: Normal heart sounds  Pulmonary:      Effort: Pulmonary effort is normal       Breath sounds: Normal breath sounds  Abdominal:      General: Abdomen is flat  There is no distension  Palpations: Abdomen is soft  There is no mass  Tenderness: There is no abdominal tenderness  There is no guarding or rebound  Hernia: No hernia is present  Neurological:      General: No focal deficit present  Mental Status: He is alert and oriented to person, place, and time  Psychiatric:         Mood and Affect: Mood normal          Behavior: Behavior normal           Discussion with Family: Patient declined call to   Discharge instructions/Information to patient and family:   See after visit summary for information provided to patient and family        Provisions for Follow-Up Care:  See after visit summary for information related to follow-up care and any pertinent home health orders  Disposition:   Home    Planned Readmission:  No     Discharge Statement:  I spent 30 minutes discharging the patient  This time was spent on the day of discharge  I had direct contact with the patient on the day of discharge  Greater than 50% of the total time was spent examining patient, answering all patient questions, arranging and discussing plan of care with patient as well as directly providing post-discharge instructions  Additional time then spent on discharge activities  Discharge Medications:  See after visit summary for reconciled discharge medications provided to patient and/or family        **Please Note: This note may have been constructed using a voice recognition system**

## 2022-10-26 NOTE — ASSESSMENT & PLAN NOTE
· BP acceptable, can continue lisinopril 10 mg daily
· Cont PPI
· Cont buspirone 5 mg, takes it BID (Rx TID)
· Continue outpatient regimen
· Continue outpatient regimen
· Labs remained stable  · EGD noted positive for gastric polyps  · Continue medications for gastritis and GERD  · To follow-up with GI on outpatient basis for results of biopsy  · Of note patient reports family history of eosinophilic esophagitis
· Presented to [facility] with abd pain x 3 months  · CTAP shows "Metallic density within the small bowel loops in the mid hemiabdomen which may represent ingested material versus changes cannot rule out foreign body"  · Pt reports swallowing dental crowns in the past  · Afebrile, no leukocytosis  · Lipase wnl, LFTs wnl  · ER discussed case with Surgery on call who recommended GI and Surgery consult given that FB has not passed on its own  · Keep NPO for possible intervention today  · IVF while NPO, supportive care  · Serial abd exams
no

## 2022-10-26 NOTE — TELEPHONE ENCOUNTER
Patient is requesting an order be placed in his chart for a Follow up X-ray for 1 week as Patient was recently  D/c for ( Foreign body digestion) He will need x ray in 1 week to confirm it has passed

## 2022-10-26 NOTE — DISCHARGE INSTR - AVS FIRST PAGE
See your family doctor for an abdominal xray in 1 week  Make an appointment to see a stomach doctor for follow up soon

## 2022-10-27 ENCOUNTER — TELEPHONE (OUTPATIENT)
Dept: GASTROENTEROLOGY | Facility: CLINIC | Age: 37
End: 2022-10-27

## 2022-10-27 NOTE — TELEPHONE ENCOUNTER
Patient is calling again in regards to an xray order  He says this is  time sensitive and he was supposed to have it done yesterday  Patient is requesting a call back

## 2022-10-27 NOTE — TELEPHONE ENCOUNTER
Patients GI provider:  Dr Joe Garcia    Number to return call: ([phone])    Reason for call: Pt calling to cancel his upcoming endoscopy with Dr Goran Pascual  Pt stated he already had the procedure done while in the hospital and it is no longer needed  He does have a follow up scheduled with Dr Goran Pascual       Scheduled procedure/appointment date if applicable: EGD procedure November 9TH

## 2022-10-28 ENCOUNTER — TRANSITIONAL CARE MANAGEMENT (OUTPATIENT)
Dept: FAMILY MEDICINE CLINIC | Facility: CLINIC | Age: 37
End: 2022-10-28

## 2022-11-04 NOTE — TELEPHONE ENCOUNTER
Order placed  Please be aware, Dr Nishant Seay has been on nights and should not have been paged during normal business hours  Additionally, urgent tasks should generally not been sent to night float resident

## 2022-11-04 NOTE — TELEPHONE ENCOUNTER
Can you please place this order for an xray, it was previously sent to Dr Ney Sanchez 9 days ago but nothing has been ordered  I cory texted him with no response  Patient was told he needed an xray 1 week after d/c for swallowed foreign body

## 2022-11-04 NOTE — TELEPHONE ENCOUNTER
Patient calling in asking for xray to be placed as it has been over a week and needs to get it done ASAP

## 2022-11-08 ENCOUNTER — TELEPHONE (OUTPATIENT)
Dept: GASTROENTEROLOGY | Facility: CLINIC | Age: 37
End: 2022-11-08

## 2022-11-21 ENCOUNTER — HOSPITAL ENCOUNTER (OUTPATIENT)
Dept: RADIOLOGY | Facility: HOSPITAL | Age: 37
Discharge: HOME | End: 2022-11-21

## 2022-11-21 DIAGNOSIS — S30.851A METAL FOREIGN BODY IN ABDOMEN: ICD-10-CM

## 2022-12-27 ENCOUNTER — OFFICE VISIT (OUTPATIENT)
Dept: FAMILY MEDICINE CLINIC | Facility: CLINIC | Age: 37
End: 2022-12-27

## 2022-12-27 VITALS
TEMPERATURE: 97 F | DIASTOLIC BLOOD PRESSURE: 80 MMHG | SYSTOLIC BLOOD PRESSURE: 142 MMHG | WEIGHT: 212.8 LBS | RESPIRATION RATE: 18 BRPM | HEIGHT: 72 IN | HEART RATE: 123 BPM | OXYGEN SATURATION: 98 % | BODY MASS INDEX: 28.82 KG/M2

## 2022-12-27 DIAGNOSIS — I10 PRIMARY HYPERTENSION: ICD-10-CM

## 2022-12-27 DIAGNOSIS — F41.9 ANXIETY: ICD-10-CM

## 2022-12-27 DIAGNOSIS — R42 INTERMITTENT LIGHTHEADEDNESS: Primary | ICD-10-CM

## 2022-12-27 NOTE — ASSESSMENT & PLAN NOTE
- Patient was started on Lisinopril 10 mg in September 2022 due to high diastolic BP in the 90'R-020C  - BP today 142/80  Will discontinue and reevaluate in 3 days  If still uncontrolled will switch to Losartan  - Episode of flushing this AM noted likely related to anxiety  Given negative family cardiac history and negative exam findings today, will monitor  Instructed patient to go to ED if having worsening symptoms not limited to chest pain, shortness of breath, dizziness, headache and BP of 180/110 or higher  Patient understands  He declined further evaluation today as he feels better and is heading to [facility] to see his sister   Patient has had troponins and EKG done during previous hospitalization a few months ago

## 2022-12-27 NOTE — ASSESSMENT & PLAN NOTE
- Suspect related to Buspar, however will discontinue Lisinopril first given BP today and adverse effects reported  - No abnormal findings on physical exam  No nystagmus    - Patient reports this is unrelated to previous episode of lightheadedness associated with turning to the left, last evaluated in March 2022

## 2022-12-27 NOTE — ASSESSMENT & PLAN NOTE
- Patient appears very anxious  - Patient reports he decreased dosage of Buspar from TID to BID himself because it was making him feel uneasy  - Will consider changing Buspar if no improvement with anti-hypertensive adjustments  - Encouraged pt to seek meditation techniques as his father also struggled with similar

## 2022-12-27 NOTE — PROGRESS NOTES
[facility] Office visit    Assessment/Plan:     1  Intermittent lightheadedness  Assessment & Plan:  - Suspect related to Buspar, however will discontinue Lisinopril first given BP today and adverse effects reported  - No abnormal findings on physical exam  No nystagmus    - Patient reports this is unrelated to previous episode of lightheadedness associated with turning to the left, last evaluated in March 2022  2  Primary hypertension  Assessment & Plan:  - Patient was started on Lisinopril 10 mg in September 2022 due to high diastolic BP in the 16'[id number]-[id number]  - BP today 142/80  Will discontinue and reevaluate in 3 days  If still uncontrolled will switch to Losartan  - Episode of flushing this AM noted likely related to anxiety  Given negative family cardiac history and negative exam findings today, will monitor  Instructed patient to go to ED if having worsening symptoms not limited to chest pain, shortness of breath, dizziness, headache and BP of 180/110 or higher  Patient understands  He declined further evaluation today as he feels better and is heading to [facility] to see his sister  Patient has had troponins and EKG done during previous hospitalization a few months ago      3  Anxiety  Assessment & Plan:  - Patient appears very anxious  - Patient reports he decreased dosage of Buspar from TID to BID himself because it was making him feel uneasy  - Will consider changing Buspar if no improvement with anti-hypertensive adjustments  - Encouraged pt to seek meditation techniques as his father also struggled with similar         Return in about 3 days (around 12/30/2022) for Recheck BP after stopping Lisinopril   Subjective:   HPI  Bridgett Pulido is a 44 y o  male who presents with dizziness which he has been having for months  Patient states he thinks the symptoms started after a few medication adjustments were made in the summer    He states he stopped taking citalopram in August and changed to BuSpar because of adverse effects of severe nightmares  He also reports it made him feel very uneasy  He reports he is in constant fear since switching off the Citalopram and is wondering if he should go back  Patient states at the same time he was started on lisinopril by a doctor at this clinic because his bottom number blood pressure was high  He states he was very anxious this morning when they measured his blood pressure and is worried the blood pressure is going to drive him crazy as it always decreases when it is rechecked  He also states this morning he felt like "blood was pumping" over his right ear and he was bright red  Towards the end of the visit patient notes that he has also had left-sided shoulder and chest pain for a few weeks  He states most recent episode happened over the weekend when he went to a jets game  He states his significant other is an RN and thinks it is musculoskeletal and was told it might be costocohondritis  He is asking if there is any other treatment than NSAID's  Of note patient states he usually takes his Lisinopril around this time 11AM, and was very anxious about coming here  Patient states he is unhappy with Lisinopril, reports his blood pressures tend to be labile, and he doesn't feel good  He would like to know what he can do  Currently denies new chest pain, shortness of breath, palpitations, diaphoresis, lightheadedness  He states he is very anxious  Review of Systems   Eyes: Negative for pain and visual disturbance  Respiratory: Negative for shortness of breath  Cardiovascular: Negative for palpitations  Flushing on right side of face this AM, which has resolved  Anterior chest wall pain/left shoulder pain for weeks, worse with movement  Gastrointestinal: Negative for abdominal pain, nausea and vomiting  Genitourinary: Negative for dysuria  Musculoskeletal: Negative for arthralgias  Skin: Negative for rash     Neurological: Positive for light-headedness  Negative for dizziness and headaches  Psychiatric/Behavioral: The patient is nervous/anxious  All other systems reviewed and are negative  Objective:     /80 (BP Location: Left arm, Patient Position: Sitting, Cuff Size: Standard)   Pulse (!) 123   Temp (!) 97 °F (36 1 °C) (Tympanic Core)   Resp 18   Ht 6' (1 829 m)   Wt 96 5 kg (212 lb 12 8 oz)   SpO2 98%   BMI 28 86 kg/m²      Physical Exam  Constitutional:       Appearance: He is not ill-appearing or diaphoretic  HENT:      Head: Normocephalic  Mouth/Throat:      Mouth: Mucous membranes are moist       Pharynx: No oropharyngeal exudate or posterior oropharyngeal erythema  Cardiovascular:      Rate and Rhythm: Normal rate and regular rhythm  Pulses: Normal pulses  Heart sounds: Normal heart sounds  No murmur heard  Pulmonary:      Effort: Pulmonary effort is normal  No respiratory distress  Breath sounds: Normal breath sounds  Abdominal:      Palpations: Abdomen is soft  Tenderness: There is no abdominal tenderness  There is no guarding  Skin:     General: Skin is warm and dry  Neurological:      Mental Status: He is alert  Motor: No weakness     Psychiatric:      Comments: Appears anxious          ** Please Note: This note has been constructed using a voice recognition system **     Osman Mota MD  12/27/22  2:04 PM

## 2022-12-30 ENCOUNTER — OFFICE VISIT (OUTPATIENT)
Dept: FAMILY MEDICINE CLINIC | Facility: CLINIC | Age: 37
End: 2022-12-30

## 2022-12-30 DIAGNOSIS — R07.89 CHEST WALL PAIN: ICD-10-CM

## 2022-12-30 DIAGNOSIS — R00.0 TACHYCARDIA: ICD-10-CM

## 2022-12-30 DIAGNOSIS — G56.03 BILATERAL CARPAL TUNNEL SYNDROME: ICD-10-CM

## 2022-12-30 DIAGNOSIS — R42 INTERMITTENT LIGHTHEADEDNESS: ICD-10-CM

## 2022-12-30 DIAGNOSIS — F41.9 ANXIETY: Primary | ICD-10-CM

## 2022-12-30 DIAGNOSIS — R42 DIZZINESS: ICD-10-CM

## 2022-12-30 DIAGNOSIS — R94.31 ABNORMAL ECG: ICD-10-CM

## 2022-12-30 PROBLEM — R10.9 ABDOMINAL PAIN: Status: RESOLVED | Noted: 2022-10-25 | Resolved: 2022-12-30

## 2022-12-30 RX ORDER — CITALOPRAM 10 MG/1
10 TABLET ORAL DAILY
Qty: 90 TABLET | Refills: 0 | Status: SHIPPED | OUTPATIENT
Start: 2022-12-30

## 2022-12-30 RX ORDER — CITALOPRAM 10 MG/1
10 TABLET ORAL DAILY
Qty: 90 TABLET | Refills: 0 | Status: SHIPPED | OUTPATIENT
Start: 2022-12-30 | End: 2022-12-30 | Stop reason: SDUPTHER

## 2022-12-30 NOTE — PROGRESS NOTES
[facility] Office visit    This is a 214 visit  Dr Bart Sherman was present in the room and examined the patient  Assessment/Plan:     1  Anxiety  Assessment & Plan:  · Anxiety related to different medical conditions and medications side effects  Not relieved by Buspar  · Restarted Citalopram 10 mg daily  Discussed possible side effects  Can increase to 20 mg on f/u if anxiety not managed well  (He was on Celexa since 2017  He's tried Wellbutrin to augment Celexa, but it was discontinued due to throat swelling 10 days after starting on Wellbutri)  · Stop Buspar  · F/u in 4-6 wks    Orders:  -     citalopram (CeleXA) 10 mg tablet; Take 1 tablet (10 mg total) by mouth daily    2  Intermittent lightheadedness  Assessment & Plan:  · Likely related to anxiety vs dizziness with change in position  · Can discontinue lisinopril, but patient wants to be cautious due to family hx of HTN and his diastolic BP in  on prior readings  Plan  · Orthostatic BP on next visit  · Echo findings  · F/u in 4-6 wks after restarting citalopram      3  Dizziness  -     POCT ECG    4  Abnormal ECG  Comments:  Tachycardic  POC ECG 12/30: NSR, no ST changes  Q waves in leads 2, 3, and avF  High voltage and likely LVH  Orders:  -     Echo complete w/ contrast if indicated; Future; Expected date: 12/30/2022    5  Tachycardia  Comments:  Anxiety vs abnormal ECG  F/u Echo  Orders:  -     Echo complete w/ contrast if indicated; Future; Expected date: 12/30/2022    6  Chest wall pain  Comments:  Costochondritis vs MSK tenderness  Orders:  -     Diclofenac Sodium (VOLTAREN) 1 %; Apply 2 g topically 4 (four) times a day    7  Bilateral carpal tunnel syndrome  Assessment & Plan:  Bilateral Kenalog injections on 3/2022  Referral to ortho for possible surgery  Orders:  -     Ambulatory Referral to Orthopedic Surgery; Future       Return in about 4 weeks (around 1/27/2023) for F/u with [facility] RAMILA DALTON [facility]  [facility] for anxiety       Subjective: KAELA  Candy Stapleton is a 44 y o  male presented for a BP recheck and f/u dizziness and anxiety  On the last visit, lisinopril was stopped due to lightheadedness  Patient continues to feel lightheaded and dizziness, worse with positional change, since stopping lisinopril 3 days ago  He suspect that the symptom might be due to either COVID at the end of October or from starting Buspar 2 wks after the COVID infection  In terms of his mood, Buspar has not helped with anxiety and irritabilty  He has moments where his thoughts are "aggressive " He felt that the anxiety is better controlled with he was on Citalopram 20 mg daily  He had nightmares and "brain zap," where he feels like falling off a yesenia, while trying to wean off of citalopram     He also reported pain in left shoulder and left pectorius muscle  He doesn't recall any strenuous activity  Takes Ibuprofen for pain  Pain is worse after driving for a long period of time  He works as an [employer]   Patient also has hx of carpel tunnel syndrome on both hands  Saw Dr Trevor Jauregui and had a shot in each of his hands  Unsure if it is a cortisone shot  Review of Systems   Constitutional: Negative for chills and fever  HENT: Negative for ear pain and sore throat  Eyes: Negative for pain and visual disturbance  Respiratory: Negative for cough and shortness of breath  Cardiovascular: Positive for chest pain  Negative for palpitations and leg swelling  Gastrointestinal: Negative for abdominal pain and vomiting  Genitourinary: Negative for dysuria and hematuria  Musculoskeletal: Positive for arthralgias (hands)  Negative for back pain  Skin: Negative for color change and rash  Neurological: Positive for dizziness and light-headedness  Negative for seizures and syncope  Psychiatric/Behavioral: The patient is nervous/anxious  All other systems reviewed and are negative         Objective:     /86   Pulse 84   Temp 97 5 °F (36 4 °C) (Tympanic) Resp 16   Ht 6' (1 829 m)   Wt 96 2 kg (212 lb)   SpO2 98%   BMI 28 75 kg/m²      Initial /98 and   Physical Exam  Constitutional:       General: He is not in acute distress  Appearance: Normal appearance  HENT:      Head: Normocephalic and atraumatic  Eyes:      Pupils: Pupils are equal, round, and reactive to light  Cardiovascular:      Rate and Rhythm: Regular rhythm  Tachycardia present  Pulses: Normal pulses  Heart sounds: Normal heart sounds  No murmur heard  Pulmonary:      Effort: Pulmonary effort is normal  No respiratory distress  Breath sounds: Normal breath sounds  No wheezing  Abdominal:      General: Bowel sounds are normal       Palpations: Abdomen is soft  Skin:     General: Skin is warm and dry  Neurological:      General: No focal deficit present  Mental Status: He is alert and oriented to person, place, and time     Psychiatric:         Behavior: Behavior normal          Judgment: Judgment normal       Comments: Anxious mood and thought content with concerns for personal health       ** Please Note: This note has been constructed using a voice recognition system **     [address] Edy Reynolds MD  01/02/23  10:20 AM

## 2023-01-02 VITALS
RESPIRATION RATE: 16 BRPM | HEART RATE: 84 BPM | DIASTOLIC BLOOD PRESSURE: 86 MMHG | SYSTOLIC BLOOD PRESSURE: 124 MMHG | TEMPERATURE: 97.5 F | HEIGHT: 72 IN | OXYGEN SATURATION: 98 % | BODY MASS INDEX: 28.71 KG/M2 | WEIGHT: 212 LBS

## 2023-01-02 PROBLEM — G56.03 BILATERAL CARPAL TUNNEL SYNDROME: Status: ACTIVE | Noted: 2023-01-02

## 2023-01-02 PROBLEM — G56.02 CARPAL TUNNEL SYNDROME OF LEFT WRIST: Status: ACTIVE | Noted: 2023-01-02

## 2023-01-02 NOTE — ASSESSMENT & PLAN NOTE
· Anxiety related to different medical conditions and medications side effects  Not relieved by Buspar  · Restarted Citalopram 10 mg daily  Discussed possible side effects  Can increase to 20 mg on f/u if anxiety not managed well  (He was on Celexa since 2017  He's tried Wellbutrin to augment Celexa, but it was discontinued due to throat swelling 10 days after starting on Wellbutri)  · Stop Buspar    · F/u in 4-6 wks

## 2023-01-02 NOTE — ASSESSMENT & PLAN NOTE
· Likely related to anxiety vs dizziness with change in position  · Can discontinue lisinopril, but patient wants to be cautious due to family hx of HTN and his diastolic BP in  on prior readings      Plan  · Orthostatic BP on next visit  · Echo findings  · F/u in 4-6 wks after restarting citalopram

## 2023-01-06 ENCOUNTER — HOSPITAL ENCOUNTER (OUTPATIENT)
Dept: NON INVASIVE DIAGNOSTICS | Facility: HOSPITAL | Age: 38
Discharge: HOME | End: 2023-01-06

## 2023-01-06 VITALS
HEART RATE: 98 BPM | DIASTOLIC BLOOD PRESSURE: 84 MMHG | SYSTOLIC BLOOD PRESSURE: 153 MMHG | WEIGHT: 212 LBS | BODY MASS INDEX: 28.71 KG/M2 | HEIGHT: 72 IN

## 2023-01-06 DIAGNOSIS — R94.31 ABNORMAL ECG: ICD-10-CM

## 2023-01-06 DIAGNOSIS — R00.0 TACHYCARDIA: ICD-10-CM

## 2023-01-06 LAB
AORTIC ROOT: 2.9 CM
APICAL FOUR CHAMBER EJECTION FRACTION: 60 %
ASCENDING AORTA: 3 CM
E WAVE DECELERATION TIME: 122 MS
FRACTIONAL SHORTENING: 30 % (ref 28–44)
INTERVENTRICULAR SEPTUM IN DIASTOLE (PARASTERNAL SHORT AXIS VIEW): 1.3 CM
INTERVENTRICULAR SEPTUM: 1.3 CM (ref 0.6–1.1)
LAAS-AP2: 12.8 CM2
LAAS-AP4: 11.5 CM2
LEFT ATRIUM SIZE: 3.1 CM
LEFT INTERNAL DIMENSION IN SYSTOLE: 2.6 CM (ref 2.1–4)
LEFT VENTRICULAR INTERNAL DIMENSION IN DIASTOLE: 3.7 CM (ref 3.5–6)
LEFT VENTRICULAR POSTERIOR WALL IN END DIASTOLE: 1.3 CM
LEFT VENTRICULAR STROKE VOLUME: 34 ML
LVSV (TEICH): 34 ML
MV E'TISSUE VEL-SEP: 10 CM/S
MV PEAK A VEL: 0.58 M/S
MV PEAK E VEL: 68 CM/S
MV STENOSIS PRESSURE HALF TIME: 35 MS
MV VALVE AREA P 1/2 METHOD: 6.29 CM2
RIGHT ATRIUM AREA SYSTOLE A4C: 9.8 CM2
RIGHT VENTRICLE ID DIMENSION: 2.5 CM
SL CV LEFT ATRIUM LENGTH A2C: 4.2 CM
SL CV LV EF: 58
SL CV PED ECHO LEFT VENTRICLE DIASTOLIC VOLUME (MOD BIPLANE) 2D: 59 ML
SL CV PED ECHO LEFT VENTRICLE SYSTOLIC VOLUME (MOD BIPLANE) 2D: 25 ML

## 2023-01-09 ENCOUNTER — TELEPHONE (OUTPATIENT)
Dept: FAMILY MEDICINE CLINIC | Facility: CLINIC | Age: 38
End: 2023-01-09

## 2023-01-31 ENCOUNTER — APPOINTMENT (EMERGENCY)
Dept: CT IMAGING | Facility: HOSPITAL | Age: 38
End: 2023-01-31

## 2023-01-31 ENCOUNTER — HOSPITAL ENCOUNTER (EMERGENCY)
Facility: HOSPITAL | Age: 38
Discharge: HOME | End: 2023-01-31
Attending: EMERGENCY MEDICINE

## 2023-01-31 VITALS
HEIGHT: 72 IN | SYSTOLIC BLOOD PRESSURE: 124 MMHG | BODY MASS INDEX: 29.8 KG/M2 | HEART RATE: 80 BPM | DIASTOLIC BLOOD PRESSURE: 80 MMHG | OXYGEN SATURATION: 94 % | WEIGHT: 220 LBS | RESPIRATION RATE: 18 BRPM | TEMPERATURE: 96.8 F

## 2023-01-31 DIAGNOSIS — K08.89 PAIN, DENTAL: ICD-10-CM

## 2023-01-31 DIAGNOSIS — G50.0 TRIGEMINAL NEURALGIA: ICD-10-CM

## 2023-01-31 DIAGNOSIS — R51.9 FACIAL PAIN: ICD-10-CM

## 2023-01-31 DIAGNOSIS — K05.30 PERIODONTITIS: Primary | ICD-10-CM

## 2023-01-31 LAB
ANION GAP SERPL CALCULATED.3IONS-SCNC: 12 MMOL/L (ref 4–13)
BASOPHILS # BLD AUTO: 0.05 THOUSANDS/ÂΜL (ref 0–0.1)
BASOPHILS NFR BLD AUTO: 0 % (ref 0–1)
BUN SERPL-MCNC: 9 MG/DL (ref 5–25)
CALCIUM SERPL-MCNC: 9.9 MG/DL (ref 8.4–10.2)
CHLORIDE SERPL-SCNC: 101 MMOL/L (ref 96–108)
CO2 SERPL-SCNC: 25 MMOL/L (ref 21–32)
CREAT SERPL-MCNC: 0.98 MG/DL (ref 0.6–1.3)
EOSINOPHIL # BLD AUTO: 0.19 THOUSAND/ÂΜL (ref 0–0.61)
EOSINOPHIL NFR BLD AUTO: 1 % (ref 0–6)
ERYTHROCYTE [DISTWIDTH] IN BLOOD BY AUTOMATED COUNT: 12.6 % (ref 11.6–15.1)
GFR SERPL CREATININE-BSD FRML MDRD: 97 ML/MIN/1.73SQ M
GLUCOSE SERPL-MCNC: 81 MG/DL (ref 65–140)
GLUCOSE SERPL-MCNC: 86 MG/DL (ref 65–140)
HCT VFR BLD AUTO: 52.5 % (ref 36.5–49.3)
HGB BLD-MCNC: 17.2 G/DL (ref 12–17)
IMM GRANULOCYTES # BLD AUTO: 0.03 THOUSAND/UL (ref 0–0.2)
IMM GRANULOCYTES NFR BLD AUTO: 0 % (ref 0–2)
LYMPHOCYTES # BLD AUTO: 4.26 THOUSANDS/ÂΜL (ref 0.6–4.47)
LYMPHOCYTES NFR BLD AUTO: 32 % (ref 14–44)
MCH RBC QN AUTO: 28.6 PG (ref 26.8–34.3)
MCHC RBC AUTO-ENTMCNC: 32.8 G/DL (ref 31.4–37.4)
MCV RBC AUTO: 87 FL (ref 82–98)
MONOCYTES # BLD AUTO: 0.7 THOUSAND/ÂΜL (ref 0.17–1.22)
MONOCYTES NFR BLD AUTO: 5 % (ref 4–12)
NEUTROPHILS # BLD AUTO: 8.16 THOUSANDS/ÂΜL (ref 1.85–7.62)
NEUTS SEG NFR BLD AUTO: 62 % (ref 43–75)
NRBC BLD AUTO-RTO: 0 /100 WBCS
PLATELET # BLD AUTO: 313 THOUSANDS/UL (ref 149–390)
PMV BLD AUTO: 10.5 FL (ref 8.9–12.7)
POTASSIUM SERPL-SCNC: 3.4 MMOL/L (ref 3.5–5.3)
RBC # BLD AUTO: 6.01 MILLION/UL (ref 3.88–5.62)
SODIUM SERPL-SCNC: 138 MMOL/L (ref 135–147)
WBC # BLD AUTO: 13.39 THOUSAND/UL (ref 4.31–10.16)

## 2023-01-31 RX ORDER — AMOXICILLIN AND CLAVULANATE POTASSIUM 875; 125 MG/1; MG/1
1 TABLET, FILM COATED ORAL EVERY 12 HOURS
Qty: 14 TABLET | Refills: 0 | Status: SHIPPED | OUTPATIENT
Start: 2023-01-31 | End: 2023-02-07

## 2023-01-31 RX ORDER — AMOXICILLIN AND CLAVULANATE POTASSIUM 875; 125 MG/1; MG/1
1 TABLET, FILM COATED ORAL ONCE
Status: COMPLETED | OUTPATIENT
Start: 2023-01-31 | End: 2023-01-31

## 2023-01-31 RX ORDER — KETOROLAC TROMETHAMINE 30 MG/ML
15 INJECTION, SOLUTION INTRAMUSCULAR; INTRAVENOUS ONCE
Status: COMPLETED | OUTPATIENT
Start: 2023-01-31 | End: 2023-01-31

## 2023-01-31 RX ADMIN — IOHEXOL 85 ML: 350 INJECTION, SOLUTION INTRAVENOUS at 15:28

## 2023-01-31 RX ADMIN — KETOROLAC TROMETHAMINE 15 MG: 30 INJECTION, SOLUTION INTRAMUSCULAR at 15:12

## 2023-01-31 RX ADMIN — AMOXICILLIN AND CLAVULANATE POTASSIUM 1 TABLET: 875; 125 TABLET, FILM COATED ORAL at 16:37

## 2023-01-31 NOTE — DISCHARGE INSTRUCTIONS
Please schedule an appointment with dentistry and oral surgery as soon as possible for further evaluation and management of your concerns  Continue with antibiotics as prescribed  If you develop any new, concerning, worsening symptoms, please return to the emergency department for re-evaluation

## 2023-01-31 NOTE — ED TRIAGE NOTES
Via WR w/complaint of bilateral facial pain, eye pain and back of head pain x5 days; states "today the left side of my face is numb"; denies nausea, vomiting, diarrhea and/or fever; admits to multiple cavities on left side of jaw; denies chest pain and/or SOB;

## 2023-01-31 NOTE — ED PROVIDER NOTES
History  Chief Complaint   Patient presents with   • Facial Pain     Via WR w/complaint of bilateral facial pain, eye pain and back of head pain x5 days; states "today the left side of my face is numb"; denies nausea, vomiting, diarrhea and/or fever; admits to multiple cavities on left side of jaw; denies chest pain and/or SOB;   • Headache     42-year-old male with history of anxiety presents to the emergency department for evaluation of sided facial pain that is been going on for the past 5 days  He reports that he has struggled with poor dentition for a while and feels that his facial pain is due to this  He also reports abnormal sensation to that side of his face  Also reports intermittent headache and eye pain for the past 5 days as well  Currently not having the symptoms  He is concerned that he has a surgical emergency  He has been to the ED frequently for similar concerns in the past has been discharged on antibiotics for his dental infections  He denies nausea, vomiting, diarrhea, fever, sore throat, chest pain, shortness of breath, headache, vision disturbances, neck pain, extremity weakness, slurred speech, confusion  Patient does appear very anxious during my evaluation  When questioned about his anxiety he reports that he feels this does exacerbate his symptoms frequently  He has not tried anything for his pain other than Advil  He does not want to try anything stronger due to the risk of substance abuse and addiction  History provided by:  Patient   used: No        Prior to Admission Medications   Prescriptions Last Dose Informant Patient Reported? Taking?    Diclofenac Sodium (VOLTAREN) 1 %   No No   Sig: Apply 2 g topically 4 (four) times a day   citalopram (CeleXA) 10 mg tablet   No No   Sig: Take 1 tablet (10 mg total) by mouth daily   esomeprazole (NexIUM) 20 mg capsule   Yes No   Sig: Take 20 mg by mouth every morning before breakfast   lisinopril (ZESTRIL) 10 mg tablet   No No   Sig: Take 1 tablet (10 mg total) by mouth daily   polyethylene glycol (MIRALAX) 17 g packet   No No   Sig: Take 17 g by mouth daily   sucralfate (CARAFATE) 1 g/10 mL suspension   No No   Sig: Take 10 mL (1 g total) by mouth 4 (four) times a day Awaiting to speak to doctor      Facility-Administered Medications: None       Past Medical History:   Diagnosis Date   • Anxiety    • Anxiety and depression    • Depression    • GERD (gastroesophageal reflux disease)    • Pneumonia    • Psychiatric disorder        Past Surgical History:   Procedure Laterality Date   • CHOLECYSTECTOMY     • UT LAPAROSCOPY SURG CHOLECYSTECTOMY N/A 2021    Procedure: CHOLECYSTECTOMY LAPAROSCOPIC;  Surgeon: Briana Thao MD;  Location: [facility];  Service: General   • WISDOM TOOTH EXTRACTION         Family History   Problem Relation Age of Onset   • Anxiety disorder Father         Not sure if mini heart attack or anxiety   • Hypertension Father    • Stomach cancer Sister    • Dysphagia Sister      I have reviewed and agree with the history as documented  E-Cigarette/Vaping   • E-Cigarette Use Former User      E-Cigarette/Vaping Substances   • Nicotine No    • THC No    • CBD No    • Flavoring No    • Other No    • Unknown No      Social History     Tobacco Use   • Smoking status: Former     Years: 10 00     Types: Cigarettes     Quit date: 2016     Years since quittin 6   • Smokeless tobacco: Current     Types: Chew   Vaping Use   • Vaping Use: Former   Substance Use Topics   • Alcohol use: Never   • Drug use: Never       Review of Systems   Constitutional: Negative for chills and fever  HENT: Positive for dental problem, ear pain and facial swelling  Negative for sore throat and voice change  Eyes: Positive for pain  Negative for discharge and visual disturbance  Respiratory: Negative for cough and shortness of breath  Cardiovascular: Negative for chest pain and palpitations  Gastrointestinal: Negative for abdominal pain, nausea and vomiting  Genitourinary: Negative for dysuria and hematuria  Musculoskeletal: Negative for arthralgias and back pain  Skin: Negative for color change and rash  Neurological: Positive for headaches  Negative for dizziness, seizures, syncope and weakness  Psychiatric/Behavioral: Negative for confusion  The patient is nervous/anxious  All other systems reviewed and are negative  Physical Exam  Physical Exam  Vitals and nursing note reviewed  Constitutional:       General: He is not in acute distress  Appearance: Normal appearance  He is well-developed and normal weight  HENT:      Head: Normocephalic and atraumatic  Right Ear: Tympanic membrane and external ear normal       Left Ear: Tympanic membrane and external ear normal       Nose: Nose normal       Mouth/Throat:      Mouth: Mucous membranes are moist       Dentition: Abnormal dentition  Dental tenderness, gingival swelling and dental caries present  Tongue: Tongue does not deviate from midline  Palate: No mass  Pharynx: Oropharynx is clear  Uvula midline  No pharyngeal swelling or uvula swelling  Tonsils: No tonsillar exudate  Eyes:      General: No visual field deficit or scleral icterus  Right eye: No discharge  Left eye: No discharge  Extraocular Movements: Extraocular movements intact  Conjunctiva/sclera: Conjunctivae normal       Pupils: Pupils are equal, round, and reactive to light  Cardiovascular:      Rate and Rhythm: Normal rate  Pulses: Normal pulses  Heart sounds: Normal heart sounds  Pulmonary:      Effort: Pulmonary effort is normal  No respiratory distress  Breath sounds: Normal breath sounds  Abdominal:      General: Abdomen is flat  Palpations: Abdomen is soft  Tenderness: There is no abdominal tenderness  There is no right CVA tenderness or left CVA tenderness     Musculoskeletal: General: No swelling, tenderness or signs of injury  Normal range of motion  Cervical back: Normal range of motion and neck supple  No rigidity  Skin:     General: Skin is warm and dry  Capillary Refill: Capillary refill takes less than 2 seconds  Findings: No bruising, erythema or rash  Neurological:      General: No focal deficit present  Mental Status: He is alert and oriented to person, place, and time  Mental status is at baseline  GCS: GCS eye subscore is 4  GCS verbal subscore is 5  GCS motor subscore is 6  Cranial Nerves: Cranial nerves 2-12 are intact  No cranial nerve deficit, dysarthria or facial asymmetry  Sensory: Sensation is intact  No sensory deficit  Motor: Motor function is intact  No pronator drift  Coordination: Coordination is intact  Finger-Nose-Finger Test and Heel to [place] juma Test normal       Gait: Gait is intact  Gait normal    Psychiatric:         Mood and Affect: Mood is anxious  Speech: Speech is rapid and pressured  Behavior: Behavior normal          Thought Content:  Thought content normal          Vital Signs  ED Triage Vitals   Temperature Pulse Respirations Blood Pressure SpO2   01/31/23 1343 01/31/23 1341 01/31/23 1341 01/31/23 1341 01/31/23 1341   (!) 96 8 °F (36 °C) (!) 111 18 152/90 97 %      Temp Source Heart Rate Source Patient Position - Orthostatic VS BP Location FiO2 (%)   01/31/23 1343 01/31/23 1341 01/31/23 1341 01/31/23 1341 --   Tympanic Monitor Lying Left arm       Pain Score       01/31/23 1341       No Pain           Vitals:    01/31/23 1341 01/31/23 1457   BP: 152/90 124/80   Pulse: (!) 111 80   Patient Position - Orthostatic VS: Lying          Visual Acuity  Visual Acuity    Flowsheet Row Most Recent Value   L Pupil Size (mm) 4   R Pupil Size (mm) 4          ED Medications  Medications   ketorolac (TORADOL) injection 15 mg (15 mg Intravenous Given 1/31/23 1512)   iohexol (OMNIPAQUE) 350 MG/ML injection (SINGLE-DOSE) 85 mL (85 mL Intravenous Given 1/31/23 1528)   amoxicillin-clavulanate (AUGMENTIN) 875-125 mg per tablet 1 tablet (1 tablet Oral Given 1/31/23 1637)       Diagnostic Studies  Results Reviewed     Procedure Component Value Units Date/Time    Basic metabolic panel [[id number]]  (Abnormal) Collected: 01/31/23 1453    Lab Status: Final result Specimen: Blood from Arm, Left Updated: 01/31/23 1555     Sodium 138 mmol/L      Potassium 3 4 mmol/L      Chloride 101 mmol/L      CO2 25 mmol/L      ANION GAP 12 mmol/L      BUN 9 mg/dL      Creatinine 0 98 mg/dL      Glucose 86 mg/dL      Calcium 9 9 mg/dL      eGFR 97 ml/min/1 73sq m     Narrative:      [place] guidelines for Chronic Kidney Disease (CKD):   •  Stage 1 with normal or high GFR (GFR > 90 mL/min/1 73 square meters)  •  Stage 2 Mild CKD (GFR = 60-89 mL/min/1 73 square meters)  •  Stage 3A Moderate CKD (GFR = 45-59 mL/min/1 73 square meters)  •  Stage 3B Moderate CKD (GFR = 30-44 mL/min/1 73 square meters)  •  Stage 4 Severe CKD (GFR = 15-29 mL/min/1 73 square meters)  •  Stage 5 End Stage CKD (GFR <15 mL/min/1 73 square meters)  Note: GFR calculation is accurate only with a steady state creatinine    CBC and differential [[id number]]  (Abnormal) Collected: 01/31/23 1453    Lab Status: Final result Specimen: Blood from Arm, Left Updated: 01/31/23 1537     WBC 13 39 Thousand/uL      RBC 6 01 Million/uL      Hemoglobin 17 2 g/dL      Hematocrit 52 5 %      MCV 87 fL      MCH 28 6 pg      MCHC 32 8 g/dL      RDW 12 6 %      MPV 10 5 fL      Platelets 160 Thousands/uL      nRBC 0 /100 WBCs      Neutrophils Relative 62 %      Immat GRANS % 0 %      Lymphocytes Relative 32 %      Monocytes Relative 5 %      Eosinophils Relative 1 %      Basophils Relative 0 %      Neutrophils Absolute 8 16 Thousands/µL      Immature Grans Absolute 0 03 Thousand/uL      Lymphocytes Absolute 4 26 Thousands/µL      Monocytes Absolute 0 70 Thousand/µL      Eosinophils Absolute 0 19 Thousand/µL      Basophils Absolute 0 05 Thousands/µL     Fingerstick Glucose (POCT) [[id number]]  (Normal) Collected: 01/31/23 1338    Lab Status: Final result Updated: 01/31/23 1343     POC Glucose 81 mg/dl                  CT facial bones with contrast   Final Result by Fiordaliza Trujillo MD (01/31 8778)      No acute facial abnormality or abscess  Poor dentition with periodontal disease  Recommend direct visualization and evaluation by dentist       The study was marked in EPIC for immediate notification  Workstation performed: [id number]                    Procedures  Procedures         ED Course  ED Course as of 02/03/23 0903   Tue Jan 31, 2023   1453 Clinically concerning for dental infection  Significant abnormal dentition  Gingival erythema noted on the left with black discoloration with breakdown of teeth  No notable fluid collection however patient is requesting CT scan  Patient does admit to anxiety that may be contributing to his symptoms  Patient appears very anxious on exam, but is refusing medication to help and refuses pain medication at this time  Also refusing viscous lidocaine                                             Medical Decision Making  39-year-old male with history of anxiety presents to the emergency department for evaluation of 5 days of left-sided facial pain  Patient is moderately anxious during my evaluation with rapid and pressured speech  He is declining medications to help with his anxiety  Significant dental disease with tooth breakdown and adjacent gingival erythema  No fluid collection or abscess noted  No clinical concern for Pavel's  Vital signs are stable  Neuro exam unremarkable  I informed him of my clinical concern for dental infection and the plan for antibiotics and follow-up with a dentist soon as possible for management    When discussing the plan with the patient, he became adamant about getting a scan to rule out a surgical process  He states that it has never been this bad before  I informed him of the increased risk of long-term effects of radiation exposure  Patient is understanding of those risks and is still requesting a CT scan and blood work  Basic blood work obtained and CT facial bones with contrast ordered to evaluate for possible abscess  CT imaging negative for abscess/fluid collection  Periodontal disease noted  I do feel that patient's history of trigeminal neuralgia may be exacerbated by his abnormal dentition  I informed him of my concerns and that follow-up with oral surgery/dentist is appropriate in this setting  Patient declining pain medicine  Referrals provided to Dr Jay Byrne and general surgery as well as Dr Naomi Farr in dental   Augmentin sent to patient's pharmacy  Patient verbalizes understanding the assessment and plan and is amenable to discharge at this time  Strict return precautions discussed  Patient stable time of discharge  Facial pain: acute illness or injury  Pain, dental: acute illness or injury  Periodontitis: acute illness or injury  Trigeminal neuralgia: chronic illness or injury  Amount and/or Complexity of Data Reviewed  Labs: ordered  Radiology: ordered  Risk  Prescription drug management            Disposition  Final diagnoses:   Periodontitis   Pain, dental   Facial pain   Trigeminal neuralgia     Time reflects when diagnosis was documented in both MDM as applicable and the Disposition within this note     Time User Action Codes Description Comment    1/31/2023  3:54 PM Juni Lemmings Add [K05 30] Periodontitis     1/31/2023  3:55 PM Juni Lemmings Add [K08 89] Pain, dental     1/31/2023  3:56 PM Juni Lemmings Add [R51 9] Facial pain     1/31/2023  3:56 PM Reading Lemmings Add [G50 0] Trigeminal neuralgia       ED Disposition     ED Disposition   Discharge    Condition   Stable    Date/Time   Tue Jan 31, 2023  3:56 PM Comment   Ty Owen discharge to home/self care                 Follow-up Information     Follow up With Specialties Details Why [address] [facility] Emergency Medicine Go to  If symptoms worsen [address] [place] [id number]  [facility] Emergency Department, [address] [facility] Rob Garay, [address]    Antony Ortega, [address] [facility] [facility] Schedule an appointment as soon as possible for a visit  follow up for further evaluation of symptoms [address] [facility] [place]       Aviva Lone, DMD Oral Maxillofacial Surgery Schedule an appointment as soon as possible for a visit  follow up for further evaluation of symptoms [address] [facility] [address]             Discharge Medication List as of 1/31/2023  4:00 PM      START taking these medications    Details   amoxicillin-clavulanate (AUGMENTIN) 875-125 mg per tablet Take 1 tablet by mouth every 12 (twelve) hours for 7 days, Starting Tue 1/31/2023, Until Tue 2/7/2023, Normal         CONTINUE these medications which have NOT CHANGED    Details   citalopram (CeleXA) 10 mg tablet Take 1 tablet (10 mg total) by mouth daily, Starting Fri 12/30/2022, Normal      Diclofenac Sodium (VOLTAREN) 1 % Apply 2 g topically 4 (four) times a day, Starting Fri 12/30/2022, Normal      esomeprazole (NexIUM) 20 mg capsule Take 20 mg by mouth every morning before breakfast, Historical Med      lisinopril (ZESTRIL) 10 mg tablet Take 1 tablet (10 mg total) by mouth daily, Starting Fri 9/2/2022, Normal      polyethylene glycol (MIRALAX) 17 g packet Take 17 g by mouth daily, Starting Wed 10/26/2022, No Print      sucralfate (CARAFATE) 1 g/10 mL suspension Take 10 mL (1 g total) by mouth 4 (four) times a day Awaiting to speak to doctor, Starting Wed 9/21/2022, Normal                 PDMP Review None          ED Provider  Electronically Signed by           Katia Lopez PA-C  02/03/23 9493

## 2023-02-03 ENCOUNTER — OFFICE VISIT (OUTPATIENT)
Dept: OBGYN CLINIC | Facility: CLINIC | Age: 38
End: 2023-02-03

## 2023-02-03 ENCOUNTER — OFFICE VISIT (OUTPATIENT)
Dept: FAMILY MEDICINE CLINIC | Facility: CLINIC | Age: 38
End: 2023-02-03

## 2023-02-03 VITALS
OXYGEN SATURATION: 98 % | TEMPERATURE: 98.5 F | RESPIRATION RATE: 18 BRPM | HEART RATE: 84 BPM | DIASTOLIC BLOOD PRESSURE: 90 MMHG | SYSTOLIC BLOOD PRESSURE: 132 MMHG | WEIGHT: 207 LBS | BODY MASS INDEX: 28.07 KG/M2

## 2023-02-03 VITALS
BODY MASS INDEX: 28.25 KG/M2 | HEART RATE: 101 BPM | HEIGHT: 72 IN | DIASTOLIC BLOOD PRESSURE: 88 MMHG | SYSTOLIC BLOOD PRESSURE: 144 MMHG | WEIGHT: 208.6 LBS

## 2023-02-03 DIAGNOSIS — F41.9 ANXIETY: Primary | ICD-10-CM

## 2023-02-03 DIAGNOSIS — R68.84 JAW PAIN: ICD-10-CM

## 2023-02-03 DIAGNOSIS — F41.0 PANIC ATTACK: ICD-10-CM

## 2023-02-03 DIAGNOSIS — G56.03 BILATERAL CARPAL TUNNEL SYNDROME: ICD-10-CM

## 2023-02-03 DIAGNOSIS — I51.7 LVH (LEFT VENTRICULAR HYPERTROPHY): ICD-10-CM

## 2023-02-03 RX ORDER — HYDROXYZINE HYDROCHLORIDE 10 MG/1
10 TABLET, FILM COATED ORAL EVERY 6 HOURS PRN
Qty: 30 TABLET | Refills: 1 | Status: SHIPPED | OUTPATIENT
Start: 2023-02-03

## 2023-02-03 NOTE — PROGRESS NOTES
[facility] Office visit    Assessment/Plan:     1  Anxiety  Assessment & Plan:  Subjective improvement on Celexa 10 mg daily that was started on last visit  · Discussed possibly increasing Celexa to 20 mg daily since patient is still having anxiety daily with panic attacks  Patient declined at this point and he stated that he will let us know if symptoms are not manageable  · Patient declined therapy at this time      2  Panic attack  Assessment & Plan:  About 2-5 episodes a month of impending sense of doom  · Start on Atarax when these episodes come on  · Reviewed anticholenergic side effects and to avoid driving when taking the medication  Orders:  -     hydrOXYzine HCL (ATARAX) 10 mg tablet; Take 1 tablet (10 mg total) by mouth every 6 (six) hours as needed for anxiety    3  LVH (left ventricular hypertrophy)  Assessment & Plan:  Echo done on 1/6/2023 due to tachycardia and lightheadedness  It demonstrated mild concentric left ventricle hypertrophy  EF 58%  Normal systolic and diastolic function  · No murmurs heard on physical exam  · Currently not on any antihypertensive medication  He trialed lisinopril and stopped due to dizziness  Orders:  -     Ambulatory Referral to Cardiology; Future    4  Jaw pain  Assessment & Plan:  Bilateral intermittent jaw pain starts from the TMJ and radiates down to the maxillary and mandibular region  Pain is associated with headaches at the temple region, tingling sensation on the face, dizziness, and ear pain  · DDx: TMJ versus dental infection versus trigeminal neuralgia  · Recommended patient to see a dentist  · Trial of mouthguard for 2 weeks then follow-up in clinic for full assessment  · He is interested in acupuncture if indicated      Return in about 2 weeks (around 2/17/2023) for jaw pain  Subjective:   KAELA Alberto is a 44 y o  male presents today for follow-up anxiety and echo results    He states that since he restarted citalopram 10 mg on last visit, his anxiety has been better  Although he still get anxious daily, symptoms are more manageable  He also reported panic attacks and feeling with sense of doom like he is going to die  These episodes happen 2-5 times a months  He has been trying to monitor tachycardia at home  His resting pulse rate will be around 60-70 taken manually at home  He reports having tachycardia especially with medical appointments  Reported chewing tobacco   Denies drinking any alcohol  He is very much against using any illicit drugs  He stated that since trying marijuana when he was younger, he started having anxiety attacks  He denies any family history of drug abuse  Patient also reported that he has been struggling with jaw pain for the past couple of days  The pain starts from jaw and radiates down the maxillary and mandible region  Sometimes the pain goes up to temple and causes headaches  Described the pain as burning like his face is on fire and shooting pain when he eats  Sometimes his maxillary region has tingling sensation with pain  Pain alternates from side to side and comes on intermittently throughout the day, but he notices it happening more when he wakes up  Associated with the dizziness and ear pain  His sister uncle has history of trigeminal neuralgia  He has tried using Advil for the pain without much relief  He also states he hasn't seen a dentist in a while  Review of Systems   Constitutional: Negative for chills and fever  HENT: Negative for ear pain and sore throat  Eyes: Positive for pain  Negative for visual disturbance  Respiratory: Negative for cough, chest tightness and shortness of breath  Cardiovascular: Negative for chest pain, palpitations and leg swelling  Gastrointestinal: Negative for abdominal pain, nausea and vomiting  Genitourinary: Negative for dysuria and hematuria  Musculoskeletal: Negative for arthralgias and back pain     Skin: Negative for color change and rash  Neurological: Positive for headaches (2/10)  Negative for dizziness, seizures, syncope and light-headedness  Jaw pain   Psychiatric/Behavioral: The patient is nervous/anxious  All other systems reviewed and are negative  Objective:     /90   Pulse 84 Comment: manually  Temp 98 5 °F (36 9 °C)   Resp 18   Wt 93 9 kg (207 lb)   SpO2 98%   BMI 28 07 kg/m²        Physical Exam  Constitutional:       General: He is not in acute distress  Appearance: Normal appearance  HENT:      Head: Normocephalic and atraumatic  Eyes:      Pupils: Pupils are equal, round, and reactive to light  Cardiovascular:      Rate and Rhythm: Normal rate and regular rhythm  Pulses: Normal pulses  Heart sounds: Normal heart sounds  No murmur heard  Pulmonary:      Effort: Pulmonary effort is normal  No respiratory distress  Breath sounds: Normal breath sounds  No wheezing  Abdominal:      General: Bowel sounds are normal       Palpations: Abdomen is soft  Musculoskeletal:      Cervical back: Normal range of motion  Skin:     General: Skin is warm and dry  Neurological:      General: No focal deficit present  Mental Status: He is alert and oriented to person, place, and time     Psychiatric:         Mood and Affect: Mood normal          Behavior: Behavior normal               ** Please Note: This note has been constructed using a voice recognition system **     [address] Ed[address] Reynolds MD  02/03/23  4:58 PM

## 2023-02-03 NOTE — ASSESSMENT & PLAN NOTE
Subjective improvement on Celexa 10 mg daily that was started on last visit  · Discussed possibly increasing Celexa to 20 mg daily since patient is still having anxiety daily with panic attacks  Patient declined at this point and he stated that he will let us know if symptoms are not manageable    · Patient declined therapy at this time

## 2023-02-03 NOTE — ASSESSMENT & PLAN NOTE
Bilateral intermittent jaw pain starts from the TMJ and radiates down to the maxillary and mandibular region    Pain is associated with headaches at the temple region, tingling sensation on the face, dizziness, and ear pain  · DDx: TMJ versus dental infection versus trigeminal neuralgia  · Recommended patient to see a dentist  · Trial of mouthguard for 2 weeks then follow-up in clinic for full assessment  · He is interested in acupuncture if indicated

## 2023-02-03 NOTE — PROGRESS NOTES
Assessment/Plan:  1  Bilateral carpal tunnel syndrome  Ambulatory Referral to Orthopedic Surgery    US MSK limited        Scribe Attestation    I,:  Candida Elliott am acting as a scribe while in the presence of the attending physician :       I,:  Remigio Shaver MD personally performed the services described in this documentation    as scribed in my presence :         Nolberto Kennedy upon examination and review of his past medical history does demonstrate signs and symptoms consistent with double tunnel syndrome  As he did not have full resolution of his paresthesias after his initial steroid injections I do not believe that a second set recommended at this time  He does not have any definitive imaging to confirm carpal tunnel syndrome at this time  With this in mind, I would like to order an ultrasound of the left and right wrist to question carpal tunnel syndrome  I did note the that his significant symptoms despite nonoperative care would indicate that he would be a candidate for a carpal tunnel release  Most likely the left being performed first then the right  An order for the ultrasound was placed in his chart today  He will follow-up with me once the ultrasound of his left and right wrists are completed  Subjective:   Mitzi Campbell is a 44 y o  male who presents for initial evaluation of his bilateral carpal tunnel syndrome  He is referred to see me today by Dr Allyssa Parham and Dr Julian Coleman  He was a previous patient of Dr Tony Peña  He did receive steroid injections into the left and right wrists to treat the underlying carpal tunnel syndrome in March of last year  He states that these did provide him with some symptomatic relief  However the paresthesias have fully resolved  He notes that recently his symptoms have worsened and states that his left is worse than his right    He notes he does have some weakness into the left hand versus the right and does localize paresthesias along the median nerve distribution  He states that activities such as driving or 2 cans will worsen his symptoms  He does report to today's visit with baseline paresthesias  Review of Systems   Constitutional: Negative for chills, fever and unexpected weight change  HENT: Negative for hearing loss, nosebleeds and sore throat  Eyes: Negative for pain, redness and visual disturbance  Respiratory: Negative for cough, shortness of breath and wheezing  Cardiovascular: Negative for chest pain, palpitations and leg swelling  Gastrointestinal: Negative for abdominal pain, nausea and vomiting  Endocrine: Negative for polyphagia and polyuria  Genitourinary: Negative for dysuria and hematuria  Musculoskeletal:        See HPI   Skin: Negative for rash and wound  Neurological: Positive for numbness (left and right hands)  Negative for dizziness and headaches  Psychiatric/Behavioral: Negative for decreased concentration and suicidal ideas  The patient is not nervous/anxious            Past Medical History:   Diagnosis Date   • Anxiety    • Anxiety and depression    • Depression    • GERD (gastroesophageal reflux disease)    • Pneumonia    • Psychiatric disorder        Past Surgical History:   Procedure Laterality Date   • CHOLECYSTECTOMY  2021   • [place] LAPAROSCOPY SURG CHOLECYSTECTOMY N/A 6/25/2021    Procedure: CHOLECYSTECTOMY LAPAROSCOPIC;  Surgeon: Charu Aguila MD;  Location: [facility];  Service: General   • WISDOM TOOTH EXTRACTION         Family History   Problem Relation Age of Onset   • Anxiety disorder Father         Not sure if mini heart attack or anxiety   • Hypertension Father    • Stomach cancer Sister    • Dysphagia Sister        Social History     Occupational History   • Not on file   Tobacco Use   • Smoking status: Former   • Smokeless tobacco: Current     Types: Chew   Vaping Use   • Vaping Use: Former   Substance and Sexual Activity   • Alcohol use: Never   • Drug use: No   • Sexual activity: Yes Partners: Female         Current Outpatient Medications:   •  amoxicillin-clavulanate (AUGMENTIN) 875-125 mg per tablet, Take 1 tablet by mouth every 12 (twelve) hours for 7 days, Disp: 14 tablet, Rfl: 0  •  citalopram (CeleXA) 10 mg tablet, Take 1 tablet (10 mg total) by mouth daily, Disp: 90 tablet, Rfl: 0  •  esomeprazole (NexIUM) 20 mg capsule, Take 20 mg by mouth every morning before breakfast, Disp: , Rfl:   •  Diclofenac Sodium (VOLTAREN) 1 %, Apply 2 g topically 4 (four) times a day (Patient not taking: Reported on 2/3/2023), Disp: 100 g, Rfl: 1  •  hydrOXYzine HCL (ATARAX) 10 mg tablet, Take 1 tablet (10 mg total) by mouth every 6 (six) hours as needed for anxiety, Disp: 30 tablet, Rfl: 1  •  lisinopril (ZESTRIL) 10 mg tablet, Take 1 tablet (10 mg total) by mouth daily (Patient not taking: Reported on 2/3/2023), Disp: 90 tablet, Rfl: 2  •  polyethylene glycol (MIRALAX) 17 g packet, Take 17 g by mouth daily (Patient not taking: Reported on 2/3/2023), Disp: , Rfl: 0  •  sucralfate (CARAFATE) 1 g/10 mL suspension, Take 10 mL (1 g total) by mouth 4 (four) times a day Awaiting to speak to doctor (Patient not taking: Reported on 2/3/2023), Disp: 414 mL, Rfl: 1    Allergies   Allergen Reactions   • Wellbutrin [Bupropion] Throat Swelling     Day 10 after starting new med       Objective:  Vitals:    02/03/23 1511   BP: 144/88   Pulse: 101       Right Hand Exam     Range of Motion   Wrist   Extension: 50   Flexion: 90     Muscle Strength   Right wrist normal muscle strength: APB: 5/5  Tests   Phalen’s sign: positive  Tinel's sign (median nerve): positive    Other   Erythema: absent  Scars: absent  Sensation: decreased (median nerve distribution)  Pulse: present      Left Hand Exam     Range of Motion   Wrist   Extension: 50   Flexion: 90     Muscle Strength   Left wrist normal muscle strength: APB: 4/5      Tests   Phalen’s sign: positive  Tinel's sign (median nerve): positive    Other   Erythema: absent  Scars: absent  Sensation: decreased (median nerve distribution)  Pulse: present          Strength/Myotome Testing     Right Wrist/Hand   Right wrist normal muscle strength: APB: 5/5  Tests     Left Wrist/Hand   Positive Phalen's sign and Tinel's sign (medial nerve)  Right Wrist/Hand   Positive Phalen's sign and Tinel's sign (medial nerve)  Physical Exam  Vitals reviewed  HENT:      Head: Normocephalic and atraumatic  Eyes:      General:         Right eye: No discharge  Left eye: No discharge  Conjunctiva/sclera: Conjunctivae normal       Pupils: Pupils are equal, round, and reactive to light  Cardiovascular:      Rate and Rhythm: Normal rate  Pulmonary:      Effort: Pulmonary effort is normal  No respiratory distress  Musculoskeletal:      Cervical back: Normal range of motion and neck supple  Comments: As noted in HPI   Skin:     General: Skin is warm and dry  Neurological:      Mental Status: He is alert and oriented to person, place, and time  Psychiatric:         Mood and Affect: Mood normal          Behavior: Behavior normal          I have personally reviewed pertinent films in PACS and my interpretation is as follows: No images reviewed      This document was created using speech voice recognition software  Grammatical errors, random word insertions, pronoun errors, and incomplete sentences are an occasional consequence of this system due to software limitations, ambient noise, and hardware issues  Any formal questions or concerns about content, text, or information contained within the body of this dictation should be directly addressed to the provider for clarification

## 2023-02-03 NOTE — ASSESSMENT & PLAN NOTE
About 2-5 episodes a month of impending sense of doom  · Start on Atarax when these episodes come on  · Reviewed anticholenergic side effects and to avoid driving when taking the medication

## 2023-02-03 NOTE — ASSESSMENT & PLAN NOTE
Echo done on 1/6/2023 due to tachycardia and lightheadedness  It demonstrated mild concentric left ventricle hypertrophy  EF 58%  Normal systolic and diastolic function  · No murmurs heard on physical exam  · Currently not on any antihypertensive medication  He trialed lisinopril and stopped due to dizziness

## 2023-03-21 ENCOUNTER — CONSULT (OUTPATIENT)
Dept: CARDIOLOGY CLINIC | Facility: CLINIC | Age: 38
End: 2023-03-21

## 2023-03-21 VITALS
WEIGHT: 216 LBS | SYSTOLIC BLOOD PRESSURE: 158 MMHG | HEIGHT: 72 IN | BODY MASS INDEX: 29.26 KG/M2 | OXYGEN SATURATION: 97 % | HEART RATE: 91 BPM | DIASTOLIC BLOOD PRESSURE: 104 MMHG

## 2023-03-21 DIAGNOSIS — R07.2 PRECORDIAL PAIN: ICD-10-CM

## 2023-03-21 DIAGNOSIS — I51.7 LVH (LEFT VENTRICULAR HYPERTROPHY): Primary | ICD-10-CM

## 2023-03-21 DIAGNOSIS — R07.1 CHEST PAIN ON BREATHING: ICD-10-CM

## 2023-03-21 DIAGNOSIS — R00.2 PALPITATION: ICD-10-CM

## 2023-03-21 NOTE — PROGRESS NOTES
Consultation - Cardiology Office  [facility]  Jeanne Clifton 44 y o  male MRN: [id number]  : 1985  Unit/Bed#:  Encounter: [id number]      ASSESSMENT:  Chest pain, could be related to anxiety, myocardial ischemia has to be ruled out    Bilateral carpal tunnel syndrome    Elevated blood pressure without diagnosis of hypertension  Possibly whitecoat syndrome  BP today is 158/104 with heart rate of 91/min  According to the patient his blood pressure often runs around 120/82    ? Tachybradycardia syndrome   According to the patient sometimes his heart rate is very high sometimes it is as low as 60  Therefore we will not put the patient right away on a beta-blocker till we have documentation from Holter monitor that his heart rate is adequate to tolerate a beta-blocker    LVH  TTE, 2023:  EF 58%, mild concentric LVH, trace MR and OK    S/p cholecystectomy  Patient had jaundice and elevated liver enzymes prior to that  All liver enzymes are normal    History of anxiety, depression, psychiatric disorder and panic attack    Family H/O HTN and CAD    RECOMMENDATIONS:  4-hour Holter monitor  Exercise stress test  Lipid profile and LFTs    We will decide on lipid-lowering and antihypertensive medications after reviewing above test results    We will also look into possibility of doing ambulatory/outpatient blood pressure monitoring to rule out whitecoat syndrome      Thank you for your consultation  If you have any question please call me at [phone]      Primary Care Physician Requesting Consult: Russel Weiner DO      Reason for Consult / Principal Problem: Cardiac evaluation/LVH        HPI :     Jeanne Clifton is a 44y o  year old male who was referred by primary care doctor for evaluation following an echocardiogram which showed mild concentric left ventricular hypertrophy    Patient has severe anxiety and according to him his blood pressure is always high in the physician's office although it is usually in normal range and not stressful situations  He was previously on lisinopril and was having dizziness and hypotension and therefore it was discontinued  Patient also states that occasionally his heart rate goes down to 60 although usually it is fairly high, therefore will defer from starting him right away on a beta-blocker  Will obtain more information through Holter monitor and if available and ambulatory blood pressure monitoring  Patient also complains of occasional chest pain which could be secondary to his anxiety, however will do exercise stress test to rule out myocardial ischemia  Patient has a family history of coronary artery disease, therefore will also check his lipid profile and to risk factor modification as indicated  Review of Systems   Cardiovascular: Positive for chest pain  Psychiatric/Behavioral: The patient is nervous/anxious  All other systems reviewed and are negative        Historical Information   Past Medical History:   Diagnosis Date   • Anxiety    • Anxiety and depression    • Depression    • GERD (gastroesophageal reflux disease)    • Pneumonia    • Psychiatric disorder      Past Surgical History:   Procedure Laterality Date   • CHOLECYSTECTOMY  2021   • [place] LAPAROSCOPY SURG CHOLECYSTECTOMY N/A 6/25/2021    Procedure: CHOLECYSTECTOMY LAPAROSCOPIC;  Surgeon: Jaron Parikh MD;  Location: [facility];  Service: General   • WISDOM TOOTH EXTRACTION       Social History     Substance and Sexual Activity   Alcohol Use Never     Social History     Substance and Sexual Activity   Drug Use No     Social History     Tobacco Use   Smoking Status Former   Smokeless Tobacco Current   • Types: Chew     Family History:   Family History   Problem Relation Age of Onset   • Anxiety disorder Father         Not sure if mini heart attack or anxiety   • Hypertension Father    • Stomach cancer Sister    • Dysphagia Sister        Meds/Allergies     Allergies   Allergen Reactions   • Wellbutrin [Bupropion] Throat Swelling     Day 10 after starting new med       Current Outpatient Medications:   •  citalopram (CeleXA) 10 mg tablet, Take 1 tablet (10 mg total) by mouth daily, Disp: 90 tablet, Rfl: 0  •  esomeprazole (NexIUM) 20 mg capsule, Take 20 mg by mouth every morning before breakfast, Disp: , Rfl:   •  hydrOXYzine HCL (ATARAX) 10 mg tablet, Take 1 tablet (10 mg total) by mouth every 6 (six) hours as needed for anxiety, Disp: 30 tablet, Rfl: 1    Vitals: Blood pressure (!) 158/104, pulse 91, height 6' (1 829 m), weight 98 kg (216 lb), SpO2 97 %  ?  Body mass index is 29 29 kg/m²  Vitals:    03/21/23 1345   Weight: 98 kg (216 lb)     BP Readings from Last 3 Encounters:   03/21/23 (!) 158/104   02/03/23 144/88   02/03/23 132/90       PHYSICAL EXAMINATION:  Neurologic:  Alert & oriented x 3, no new focal deficits, Not in any acute distress but fairly anxious,  Constitutional:  Well developed, well nourished, non-toxic appearance   Eyes:  Pupil equal and reacting to light, conjunctiva normal, No JVP, No LNP   HENT:  Atraumatic, oropharynx moist, Neck- normal range of motion, no tenderness,  Neck supple   Respiratory:  Bilateral air entry, mostly clear to auscultation  Cardiovascular: S1-S2 regular rhythm  GI:  Soft, nondistended, normal bowel sounds, nontender, no hepatosplenomegaly appreciated  Musculoskeletal: no tenderness, no deformities  Skin:  Well hydrated, no rash   Lymphatic:  No lymphadenopathy noted   Extremities:  No edema and distal pulses are present    Diagnostic Studies Review Cardio:      EKG: Normal sinus rhythm, heart rate 91/min, IRBBB, poor R wave progression    Cardiac testing:   No results found for this or any previous visit  Imaging:  Chest X-Ray:   XR chest 2 views    Result Date: 5/11/2022  Impression No acute cardiopulmonary disease   Workstation performed: [id number]       CT-scan of the chest:     No CTA results available for this patient  Lab Review   Lab Results   Component Value Date    WBC 13 39 (H) 01/31/2023    HGB 17 2 (H) 01/31/2023    HCT 52 5 (H) 01/31/2023    MCV 87 01/31/2023    RDW 12 6 01/31/2023     01/31/2023     BMP:  Lab Results   Component Value Date    SODIUM 138 01/31/2023    K 3 4 (L) 01/31/2023     01/31/2023    CO2 25 01/31/2023    BUN 9 01/31/2023    CREATININE 0 98 01/31/2023    GLUC 86 01/31/2023    CALCIUM 9 9 01/31/2023    EGFR 97 01/31/2023    MG 2 3 07/02/2021     LFT:  Lab Results   Component Value Date    AST 19 10/24/2022    ALT 50 10/24/2022    ALKPHOS 63 10/24/2022    TP 7 5 10/24/2022    ALB 4 6 10/24/2022      Lab Results   Component Value Date    MES6VVSYJXYU 2 620 02/28/2021     No components found for: TSH3  No results found for: HGBA1C  Lipid Profile:   No results found for: CHOLESTEROL, HDL, LDLCALC, TRIG  No results found for: CHOLESTEROL  Lab Results   Component Value Date    TROPONINI <0 02 02/28/2021     No results found for: NTBNP   No results found for this or any previous visit (from the past 672 hour(s))  Dr Cameron Artis MD, [facility] [place]      "This note has been constructed using a voice recognition system  Therefore there may be syntax, spelling, and/or grammatical errors   Please call if you have any questions  "

## 2023-04-24 ENCOUNTER — TELEPHONE (OUTPATIENT)
Dept: CARDIOLOGY CLINIC | Facility: CLINIC | Age: 38
End: 2023-04-24

## 2023-04-24 NOTE — TELEPHONE ENCOUNTER
----- Message from Denis Calvo MD sent at 4/24/2023  3:19 PM EDT -----  Please call and inform patient that the Holter monitor was reviewed    Rhythm was normal throughout  This did not reveal any significant abnormality or arrhythmia  No symptoms were reported in the diary  Will discuss further at next office visit

## 2023-07-06 ENCOUNTER — TELEPHONE (OUTPATIENT)
Dept: OTHER | Facility: OTHER | Age: 38
End: 2023-07-06

## 2023-07-10 DIAGNOSIS — F41.9 ANXIETY: ICD-10-CM

## 2023-07-10 RX ORDER — CITALOPRAM HYDROBROMIDE 10 MG/1
10 TABLET ORAL DAILY
Qty: 90 TABLET | Refills: 1 | Status: SHIPPED | OUTPATIENT
Start: 2023-07-10 | End: 2023-07-18 | Stop reason: SDUPTHER

## 2023-07-18 ENCOUNTER — OFFICE VISIT (OUTPATIENT)
Dept: FAMILY MEDICINE CLINIC | Facility: CLINIC | Age: 38
End: 2023-07-18
Payer: COMMERCIAL

## 2023-07-18 VITALS
TEMPERATURE: 98.4 F | HEART RATE: 108 BPM | SYSTOLIC BLOOD PRESSURE: 140 MMHG | BODY MASS INDEX: 30.42 KG/M2 | RESPIRATION RATE: 21 BRPM | OXYGEN SATURATION: 99 % | WEIGHT: 224.56 LBS | DIASTOLIC BLOOD PRESSURE: 80 MMHG | HEIGHT: 72 IN

## 2023-07-18 DIAGNOSIS — F41.9 ANXIETY: Primary | ICD-10-CM

## 2023-07-18 DIAGNOSIS — F41.0 PANIC ATTACK: ICD-10-CM

## 2023-07-18 PROCEDURE — 99213 OFFICE O/P EST LOW 20 MIN: CPT | Performed by: FAMILY MEDICINE

## 2023-07-18 RX ORDER — CITALOPRAM 20 MG/1
20 TABLET ORAL DAILY
Qty: 90 TABLET | Refills: 0 | Status: SHIPPED | OUTPATIENT
Start: 2023-07-18

## 2023-07-18 NOTE — LETTER
July 27, 2023     Patient: Magi Marr  YOB: 1985  Date of Visit: 7/18/2023      To Whom it May Concern:    Magi Marr is under my professional care. Nishant Burr was seen in my office on 7/18/2023. Due to his medical conditions, it is my professional opinion that Nishant Burr would benefit from an emotional support animal.    If you have any questions or concerns, please don't hesitate to call.          Sincerely,          Hong Hernandez MD        CC: No Recipients

## 2023-07-18 NOTE — PROGRESS NOTES
Name: Brian Major      : 1985      MRN: [id number]  Encounter Provider: Yasmine Ponce MD  Encounter Date: 2023   Encounter department: [address]     1. Anxiety  Assessment & Plan:  Reports improvement on Celexa 10 mg daily but still having symptoms and panic attacks. On wellbutrin and buspar in the past. Both medications started at similar time and when wellbutrin caused throat swelling, patient stopped both. · Will increase Celexa to 20 mg  · Consider buspar   · Patient declined therapy at this time    Orders:  -     citalopram (CeleXA) 20 mg tablet; Take 1 tablet (20 mg total) by mouth daily    2. Panic attack  Assessment & Plan:  About 1-2 episodes a month of impending sense of doom. Improved from prior. · Continue Atarax PRN  · Denied medication side effects  · Continue citalopram, will increase dose to 20mg daily               Subjective      Anxiety  Presents for follow-up visit. Symptoms include excessive worry, insomnia, muscle tension, nervous/anxious behavior and restlessness. Patient reports no shortness of breath or suicidal ideas. Symptoms occur most days. The severity of symptoms is interfering with daily activities (Some improvement since re-starting Celexa although symptoms are still present. ). The quality of sleep is fair. Compliance with medications is %. He has just recently restarted citalopram in the past 6 months and he has not found it to be effective, was on higher dose before but suffered from decreased libido at the time. Has also been on Wellbutrin and BuSpar, believes Wellbutrin caused an allergic reaction with throat swelling. Also reports panic attacks that have improved since restarting celexa. Review of Systems   Constitutional: Negative for chills and fever. Respiratory: Negative for cough and shortness of breath. Gastrointestinal: Negative for abdominal pain. Neurological: Negative for headaches. "Brain zaps"   Psychiatric/Behavioral: Negative for suicidal ideas. The patient is nervous/anxious and has insomnia. Current Outpatient Medications on File Prior to Visit   Medication Sig   • esomeprazole (NexIUM) 20 mg capsule Take 20 mg by mouth every morning before breakfast   • hydrOXYzine HCL (ATARAX) 10 mg tablet Take 1 tablet (10 mg total) by mouth every 6 (six) hours as needed for anxiety       Objective     /80 (BP Location: Left arm, Patient Position: Sitting, Cuff Size: Large)   Pulse (!) 108   Temp 98.4 °F (36.9 °C) (Temporal)   Resp 21   Ht 6' (1.829 m)   Wt 102 kg (224 lb 9 oz)   SpO2 99%   BMI 30.46 kg/m²     Physical Exam  Constitutional:       Appearance: Normal appearance. He is obese. Cardiovascular:      Rate and Rhythm: Regular rhythm. Tachycardia present. Pulmonary:      Effort: Pulmonary effort is normal. No respiratory distress. Breath sounds: Normal breath sounds. Neurological:      Mental Status: He is alert. Psychiatric:         Thought Content: Thought content does not include homicidal or suicidal ideation.           Cornelius Henao MD

## 2023-07-23 NOTE — ASSESSMENT & PLAN NOTE
Reports improvement on Celexa 10 mg daily but still having symptoms and panic attacks. On wellbutrin and buspar in the past. Both medications started at similar time and when wellbutrin caused throat swelling, patient stopped both.   · Will increase Celexa to 20 mg  · Consider buspar   · Patient declined therapy at this time

## 2023-07-23 NOTE — ASSESSMENT & PLAN NOTE
About 1-2 episodes a month of impending sense of doom. Improved from prior.   · Continue Atarax PRN  · Denied medication side effects  · Continue citalopram, will increase dose to 20mg daily

## 2023-08-08 ENCOUNTER — TELEPHONE (OUTPATIENT)
Dept: FAMILY MEDICINE CLINIC | Facility: CLINIC | Age: 38
End: 2023-08-08

## 2023-08-08 NOTE — TELEPHONE ENCOUNTER
macy left on appt line:    Hello, my name is [employer] And I need to schedule an appointment as soon as possible. I'm breaking out in a rash all over. It might be shingles or something like that, something I've never had before. So if you can call me back so I can set up an appointment right away. Thank you. Bye.     Called patient and left VM advising to call us back so we can assist.

## 2023-08-09 ENCOUNTER — OFFICE VISIT (OUTPATIENT)
Dept: FAMILY MEDICINE CLINIC | Facility: CLINIC | Age: 38
End: 2023-08-09
Payer: COMMERCIAL

## 2023-08-09 VITALS
SYSTOLIC BLOOD PRESSURE: 157 MMHG | WEIGHT: 227 LBS | DIASTOLIC BLOOD PRESSURE: 97 MMHG | BODY MASS INDEX: 30.75 KG/M2 | RESPIRATION RATE: 18 BRPM | OXYGEN SATURATION: 97 % | HEART RATE: 99 BPM | HEIGHT: 72 IN

## 2023-08-09 DIAGNOSIS — L50.9 URTICARIA: ICD-10-CM

## 2023-08-09 DIAGNOSIS — B02.7 DISSEMINATED HERPES ZOSTER: Primary | ICD-10-CM

## 2023-08-09 PROCEDURE — 99214 OFFICE O/P EST MOD 30 MIN: CPT | Performed by: NURSE PRACTITIONER

## 2023-08-09 RX ORDER — ACYCLOVIR 50 MG/G
OINTMENT TOPICAL
Qty: 15 G | Refills: 2 | Status: SHIPPED | OUTPATIENT
Start: 2023-08-09 | End: 2023-08-16

## 2023-08-09 RX ORDER — ACYCLOVIR 800 MG/1
800 TABLET ORAL 4 TIMES DAILY
Qty: 40 TABLET | Refills: 0 | Status: SHIPPED | OUTPATIENT
Start: 2023-08-09 | End: 2023-08-19

## 2023-08-09 NOTE — PROGRESS NOTES
Name: Radha Whitehead      : 1985      MRN: [id number]  Encounter Provider: JOSTIN Vigil  Encounter Date: 2023   Encounter department: [facility]    Assessment & Plan   Disseminated herpes zoster   acyclovir (ZOVIRAX) 800 mg tablet                   Take 1 tablet (800 mg total) by mouth 4 (four) times a day for 10 days, Starting Wed 2023, Until Sat 2023, Normal           Uriticaria  -     acyclovir (ZOVIRAX) 5 % ointment; Apply topically every 3 (three) hours for 7 days  BMI Counseling: Body mass index is 30.79 kg/m². The BMI is above normal. Nutrition recommendations include limiting drinks that contain sugar. Rationale for BMI follow-up plan is due to patient being overweight or obese. Subjective      Patient presents with rash on left side of neck consistent with herpes zoster. Ordered Acyclovir PO and topical. Instructed infection control measures in the home. Review of Systems   Constitutional: Negative. HENT: Negative. Eyes: Negative. Respiratory: Negative. Cardiovascular: Negative. Gastrointestinal: Negative. Endocrine: Negative. Genitourinary: Negative. Musculoskeletal: Negative. Skin: Positive for rash. Allergic/Immunologic: Negative. Neurological: Negative. Hematological: Negative. Psychiatric/Behavioral: Negative.         Current Outpatient Medications on File Prior to Visit   Medication Sig   • citalopram (CeleXA) 20 mg tablet Take 1 tablet (20 mg total) by mouth daily   • esomeprazole (NexIUM) 20 mg capsule Take 20 mg by mouth every morning before breakfast   • hydrOXYzine HCL (ATARAX) 10 mg tablet Take 1 tablet (10 mg total) by mouth every 6 (six) hours as needed for anxiety       Objective     /97 (BP Location: Left arm, Patient Position: Sitting, Cuff Size: Standard)   Pulse 99   Resp 18   Ht 6' (1.829 m)   Wt 103 kg (227 lb)   SpO2 97%   BMI 30.79 kg/m²     Physical Exam  Constitutional: Appearance: Normal appearance. HENT:      Head: Normocephalic and atraumatic. Right Ear: External ear normal.      Left Ear: External ear normal.      Nose: Nose normal. No congestion. Mouth/Throat:      Mouth: Mucous membranes are moist.   Eyes:      General:         Right eye: No discharge. Left eye: No discharge. Conjunctiva/sclera: Conjunctivae normal.   Cardiovascular:      Rate and Rhythm: Normal rate and regular rhythm. Heart sounds: Normal heart sounds. Pulmonary:      Effort: Pulmonary effort is normal.      Breath sounds: Normal breath sounds. No wheezing, rhonchi or rales. Abdominal:      General: Bowel sounds are normal.      Palpations: Abdomen is soft. Tenderness: There is no abdominal tenderness. There is no guarding. Musculoskeletal:         General: Normal range of motion. Cervical back: Normal range of motion. No rigidity. Right lower leg: No edema. Left lower leg: No edema. Skin:     General: Skin is warm and dry. Findings: Rash present. Neurological:      General: No focal deficit present. Mental Status: He is alert and oriented to person, place, and time. Gait: Gait normal.   Psychiatric:         Mood and Affect: Mood normal.         Behavior: Behavior normal.         Thought Content:  Thought content normal.         Judgment: Judgment normal.      Comments: Anxious         TuesJOSTIN Coffey

## 2023-09-15 ENCOUNTER — OFFICE VISIT (OUTPATIENT)
Dept: URGENT CARE | Facility: CLINIC | Age: 38
End: 2023-09-15
Payer: COMMERCIAL

## 2023-09-15 VITALS
HEIGHT: 72 IN | DIASTOLIC BLOOD PRESSURE: 88 MMHG | RESPIRATION RATE: 16 BRPM | TEMPERATURE: 98.1 F | HEART RATE: 120 BPM | OXYGEN SATURATION: 97 % | SYSTOLIC BLOOD PRESSURE: 154 MMHG | BODY MASS INDEX: 31.45 KG/M2 | WEIGHT: 232.2 LBS

## 2023-09-15 DIAGNOSIS — R21 RASH: Primary | ICD-10-CM

## 2023-09-15 PROCEDURE — G0382 LEV 3 HOSP TYPE B ED VISIT: HCPCS | Performed by: NURSE PRACTITIONER

## 2023-09-15 PROCEDURE — 99283 EMERGENCY DEPT VISIT LOW MDM: CPT | Performed by: NURSE PRACTITIONER

## 2023-09-15 RX ORDER — PREDNISONE 10 MG/1
TABLET ORAL
Qty: 26 TABLET | Refills: 0 | Status: SHIPPED | OUTPATIENT
Start: 2023-09-15

## 2023-09-15 NOTE — PATIENT INSTRUCTIONS
Take medication as directed. Can take Benadryl as needed for itching, it can make you drowsy. Oat meal soaks can be helpful. If you develop any increased redness, rash is spreading, facial swelling, shortness of breath, difficulty breathing, fever, any new or concerning symptoms please return or proceed ER.   Advised follow-up with PCP in 3-5 days

## 2023-09-15 NOTE — PROGRESS NOTES
[facility]        NAME: Bryan Lofton is a 44 y.o. male  : 1985    MRN: [id number]  DATE: September 15, 2023  TIME: 3:01 PM    Assessment and Plan   Rash [R21]  1. Rash  predniSONE 10 mg tablet    hydrocortisone 2.5 % cream            Patient Instructions     Patient Instructions   Take medication as directed. Can take Benadryl as needed for itching, it can make you drowsy. Oat meal soaks can be helpful. If you develop any increased redness, rash is spreading, facial swelling, shortness of breath, difficulty breathing, fever, any new or concerning symptoms please return or proceed ER. Advised follow-up with PCP in 3-5 days        Chief Complaint     Chief Complaint   Patient presents with   • Rash     Rash on left side shoulder and arm pit area. Was diagnosed a month ago with Shingles , Rash came back . History of Present Illness       Rash  This is a new problem. The current episode started more than 1 month ago. The problem has been waxing and waning since onset. The affected locations include the neck, torso, left axilla and left arm. The problem is mild. The rash is characterized by redness and itchiness. It is unknown if there was an exposure to a precipitant. Associated symptoms include itching. Pertinent negatives include no congestion, cough, facial edema, fatigue, fever, joint pain, rhinorrhea, shortness of breath or sore throat. Treatments tried: acyclovir. The treatment provided no relief. There were no sick contacts. Review of Systems   Review of Systems   Constitutional: Negative for chills, diaphoresis, fatigue and fever. HENT: Negative. Negative for congestion, rhinorrhea and sore throat. Eyes: Negative for photophobia and visual disturbance. Respiratory: Negative for cough, chest tightness, shortness of breath, wheezing and stridor. Cardiovascular: Negative for chest pain and palpitations. Gastrointestinal: Negative.     Musculoskeletal: Negative for arthralgias, back pain, joint pain, joint swelling, myalgias, neck pain and neck stiffness. Skin: Positive for itching and rash. Neurological: Negative for dizziness, syncope, weakness, light-headedness, numbness and headaches. Current Medications       Current Outpatient Medications:   •  citalopram (CeleXA) 20 mg tablet, Take 1 tablet (20 mg total) by mouth daily, Disp: 90 tablet, Rfl: 0  •  esomeprazole (NexIUM) 20 mg capsule, Take 20 mg by mouth every morning before breakfast, Disp: , Rfl:   •  hydrocortisone 2.5 % cream, Apply topically 2 (two) times a day, Disp: 30 g, Rfl: 0  •  hydrOXYzine HCL (ATARAX) 10 mg tablet, Take 1 tablet (10 mg total) by mouth every 6 (six) hours as needed for anxiety, Disp: 30 tablet, Rfl: 1  •  predniSONE 10 mg tablet, 3 tablets BID x 2 days,2 tablets BID x 2 days,1 tablet BID x 2 days,1 tablet daily x 2 days. , Disp: 26 tablet, Rfl: 0  •  acyclovir (ZOVIRAX) 5 % ointment, Apply topically every 3 (three) hours for 7 days, Disp: 15 g, Rfl: 2  •  acyclovir (ZOVIRAX) 800 mg tablet, Take 1 tablet (800 mg total) by mouth 4 (four) times a day for 10 days, Disp: 40 tablet, Rfl: 0    Current Allergies     Allergies as of 09/15/2023 - Reviewed 09/15/2023   Allergen Reaction Noted   • Wellbutrin [bupropion] Throat Swelling 07/04/2022            The following portions of the patient's history were reviewed and updated as appropriate: allergies, current medications, past family history, past medical history, past social history, past surgical history and problem list.     Past Medical History:   Diagnosis Date   • Anxiety    • Anxiety and depression    • Depression    • GERD (gastroesophageal reflux disease)    • Pneumonia    • Psychiatric disorder        Past Surgical History:   Procedure Laterality Date   • CHOLECYSTECTOMY  2021   • [place] LAPAROSCOPY SURG CHOLECYSTECTOMY N/A 6/25/2021    Procedure: CHOLECYSTECTOMY LAPAROSCOPIC;  Surgeon: Stephanie Mensah MD;  Location: [facility]; Service: General   • WISDOM TOOTH EXTRACTION         Family History   Problem Relation Age of Onset   • Anxiety disorder Father         Not sure if mini heart attack or anxiety   • Hypertension Father    • Stomach cancer Sister    • Dysphagia Sister          Medications have been verified. Objective   /88   Pulse (!) 120   Temp 98.1 °F (36.7 °C)   Resp 16   Ht 6' (1.829 m)   Wt 105 kg (232 lb 3.2 oz)   SpO2 97%   BMI 31.49 kg/m²   No LMP for male patient. Physical Exam     Physical Exam  Constitutional:       General: He is not in acute distress. Appearance: Normal appearance. He is not diaphoretic. HENT:      Head: Normocephalic and atraumatic. Cardiovascular:      Rate and Rhythm: Normal rate and regular rhythm. Pulses:           Radial pulses are 2+ on the right side and 2+ on the left side. Heart sounds: Normal heart sounds, S1 normal and S2 normal.   Pulmonary:      Effort: Pulmonary effort is normal.      Breath sounds: Normal breath sounds and air entry. Skin:     General: Skin is warm and dry. Capillary Refill: Capillary refill takes less than 2 seconds. Findings: Rash (to left side of neck, axilla, and left arm) present. Rash is macular and papular. Rash is not vesicular. Neurological:      Mental Status: He is alert.

## 2023-09-20 ENCOUNTER — OFFICE VISIT (OUTPATIENT)
Dept: URGENT CARE | Facility: CLINIC | Age: 38
End: 2023-09-20
Payer: COMMERCIAL

## 2023-09-20 VITALS
DIASTOLIC BLOOD PRESSURE: 84 MMHG | HEIGHT: 72 IN | OXYGEN SATURATION: 95 % | RESPIRATION RATE: 18 BRPM | WEIGHT: 228 LBS | SYSTOLIC BLOOD PRESSURE: 138 MMHG | BODY MASS INDEX: 30.88 KG/M2 | HEART RATE: 115 BPM | TEMPERATURE: 98.8 F

## 2023-09-20 DIAGNOSIS — R05.1 ACUTE COUGH: ICD-10-CM

## 2023-09-20 DIAGNOSIS — H66.92 LEFT OTITIS MEDIA, UNSPECIFIED OTITIS MEDIA TYPE: Primary | ICD-10-CM

## 2023-09-20 LAB
SARS-COV-2 AG UPPER RESP QL IA: NEGATIVE
VALID CONTROL: NORMAL

## 2023-09-20 PROCEDURE — G0382 LEV 3 HOSP TYPE B ED VISIT: HCPCS | Performed by: PHYSICIAN ASSISTANT

## 2023-09-20 PROCEDURE — 99283 EMERGENCY DEPT VISIT LOW MDM: CPT | Performed by: PHYSICIAN ASSISTANT

## 2023-09-20 PROCEDURE — 87811 SARS-COV-2 COVID19 W/OPTIC: CPT | Performed by: PHYSICIAN ASSISTANT

## 2023-09-20 RX ORDER — AMOXICILLIN 500 MG/1
500 TABLET, FILM COATED ORAL 2 TIMES DAILY
Qty: 14 TABLET | Refills: 0 | Status: SHIPPED | OUTPATIENT
Start: 2023-09-20 | End: 2023-09-20 | Stop reason: CLARIF

## 2023-09-20 RX ORDER — AMOXICILLIN 500 MG/1
500 TABLET, FILM COATED ORAL 2 TIMES DAILY
Qty: 14 TABLET | Refills: 0 | Status: SHIPPED | OUTPATIENT
Start: 2023-09-20 | End: 2023-09-27

## 2023-09-20 NOTE — PROGRESS NOTES
[facility]      NAME: Lisa Bang is a 44 y.o. male  : 1985    MRN: [id number]  DATE: 2023  TIME: 7:05 PM    Assessment and Plan   Left otitis media, unspecified otitis media type [H66.92]  1. Left otitis media, unspecified otitis media type  amoxicillin (AMOXIL) 500 MG tablet    DISCONTINUED: amoxicillin (AMOXIL) 500 MG tablet      2. Acute cough  Poct Covid 19 Rapid Antigen Test          Patient Instructions   - rapid covid. I have prescribed an antibiotic for the infection. Please take the antibiotic as prescribed and finish the entire prescription. I recommend that the patient takes an over the counter probiotic or eats yogurt with live cultures in it [place]) to keep good bacteria in the gut and help prevent diarrhea. Wash hands frequently to prevent the spread of infection. Can use over the counter cough and cold medications to help with symptoms. Ibuprofen and/or tylenol as needed for pain or fever. If not improving over the next 3-5 days, follow up with PCP. To present to the ER if symptoms worsen. Chief Complaint     Chief Complaint   Patient presents with   • Sinusitis     Started  with head congestion. Tickle in throat with cough. History of Present Illness   Lisa Bang presents to the clinic c/o    URI   This is a new problem. Episode onset: . The problem has been unchanged. There has been no fever. Associated symptoms include congestion, coughing, a sore throat and wheezing. Pertinent negatives include no abdominal pain, chest pain, ear pain, headaches or rash. He has tried nothing for the symptoms. The treatment provided no relief. Review of Systems   Review of Systems   Constitutional: Negative for chills, diaphoresis, fatigue and fever. HENT: Positive for congestion and sore throat. Negative for ear discharge, ear pain and facial swelling. Eyes: Negative for photophobia, pain, discharge, redness, itching and visual disturbance. Respiratory: Positive for cough and wheezing. Negative for apnea, chest tightness and shortness of breath. Cardiovascular: Negative for chest pain and palpitations. Gastrointestinal: Negative for abdominal pain. Skin: Negative for color change, rash and wound. Neurological: Negative for dizziness and headaches. Hematological: Negative for adenopathy.          Current Medications     Long-Term Medications   Medication Sig Dispense Refill   • citalopram (CeleXA) 20 mg tablet Take 1 tablet (20 mg total) by mouth daily 90 tablet 0   • esomeprazole (NexIUM) 20 mg capsule Take 20 mg by mouth every morning before breakfast     • hydrocortisone 2.5 % cream Apply topically 2 (two) times a day 30 g 0   • hydrOXYzine HCL (ATARAX) 10 mg tablet Take 1 tablet (10 mg total) by mouth every 6 (six) hours as needed for anxiety 30 tablet 1   • acyclovir (ZOVIRAX) 5 % ointment Apply topically every 3 (three) hours for 7 days 15 g 2   • acyclovir (ZOVIRAX) 800 mg tablet Take 1 tablet (800 mg total) by mouth 4 (four) times a day for 10 days 40 tablet 0       Current Allergies     Allergies as of 09/20/2023 - Reviewed 09/20/2023   Allergen Reaction Noted   • Wellbutrin [bupropion] Throat Swelling 07/04/2022            The following portions of the patient's history were reviewed and updated as appropriate: allergies, current medications, past family history, past medical history, past social history, past surgical history and problem list.  Past Medical History:   Diagnosis Date   • Anxiety    • Anxiety and depression    • Depression    • GERD (gastroesophageal reflux disease)    • Pneumonia    • Psychiatric disorder      Past Surgical History:   Procedure Laterality Date   • CHOLECYSTECTOMY  2021   • [place] LAPAROSCOPY SURG CHOLECYSTECTOMY N/A 6/25/2021    Procedure: CHOLECYSTECTOMY LAPAROSCOPIC;  Surgeon: Lizzy Bird MD;  Location: [facility];  Service: General   • WISDOM TOOTH EXTRACTION       Social History     Socioeconomic History   • Marital status: /Civil Union     Spouse name: Not on file   • Number of children: Not on file   • Years of education: Not on file   • Highest education level: Not on file   Occupational History   • Not on file   Tobacco Use   • Smoking status: Former     Types: Cigarettes   • Smokeless tobacco: Current     Types: Chew   Vaping Use   • Vaping Use: Former   Substance and Sexual Activity   • Alcohol use: Never   • Drug use: No   • Sexual activity: Yes     Partners: Female   Other Topics Concern   • Not on file   Social History Narrative   • Not on file     Social Determinants of Health     Financial Resource Strain: Not on file   Food Insecurity: Not on file   Transportation Needs: Not on file   Physical Activity: Not on file   Stress: Not on file   Social Connections: Not on file   Intimate Partner Violence: Not on file   Housing Stability: Not on file       Objective   /84   Pulse (!) 115   Temp 98.8 °F (37.1 °C)   Resp 18   Ht 6' (1.829 m)   Wt 103 kg (228 lb)   SpO2 95%   BMI 30.92 kg/m²      Physical Exam     Physical Exam  Vitals and nursing note reviewed. Constitutional:       General: He is not in acute distress. Appearance: He is well-developed. He is not diaphoretic. HENT:      Head: Normocephalic and atraumatic. Right Ear: Tympanic membrane and external ear normal.      Left Ear: External ear normal. Tympanic membrane is erythematous and bulging. Nose: Nose normal.      Mouth/Throat:      Mouth: Mucous membranes are moist.      Pharynx: No oropharyngeal exudate or posterior oropharyngeal erythema. Eyes:      General: No scleral icterus. Right eye: No discharge. Left eye: No discharge. Conjunctiva/sclera: Conjunctivae normal.   Cardiovascular:      Rate and Rhythm: Normal rate and regular rhythm. Heart sounds: Normal heart sounds. No murmur heard. No friction rub. No gallop.    Pulmonary:      Effort: Pulmonary effort is normal. No respiratory distress. Breath sounds: Normal breath sounds. No decreased breath sounds, wheezing, rhonchi or rales. Skin:     General: Skin is warm and dry. Coloration: Skin is not pale. Neurological:      Mental Status: He is alert and oriented to person, place, and time. Psychiatric:         Behavior: Behavior normal.         Thought Content:  Thought content normal.         Judgment: Judgment normal.         Hardik Cruz PA-C

## 2023-11-28 DIAGNOSIS — F41.9 ANXIETY: ICD-10-CM

## 2023-11-28 NOTE — TELEPHONE ENCOUNTER
AKI left on RX line:    Hello, my name is Crocs. My birthday is October 23rd 1985 and I need a a refill on my Citalopram Celexa. It is 90 day supply but thirty. It doesn't matter what it is, 20 milligrams and I think that's what you need. If you need to call me back for an issue, my number is [phone]. Thank you.

## 2023-11-29 RX ORDER — CITALOPRAM 20 MG/1
20 TABLET ORAL DAILY
Qty: 90 TABLET | Refills: 0 | Status: SHIPPED | OUTPATIENT
Start: 2023-11-29

## 2024-04-06 ENCOUNTER — OFFICE VISIT (OUTPATIENT)
Dept: URGENT CARE | Facility: CLINIC | Age: 39
End: 2024-04-06
Payer: COMMERCIAL

## 2024-04-06 VITALS
TEMPERATURE: 98.3 F | HEIGHT: 72 IN | BODY MASS INDEX: 33.18 KG/M2 | HEART RATE: 104 BPM | RESPIRATION RATE: 16 BRPM | DIASTOLIC BLOOD PRESSURE: 97 MMHG | SYSTOLIC BLOOD PRESSURE: 137 MMHG | OXYGEN SATURATION: 98 % | WEIGHT: 245 LBS

## 2024-04-06 DIAGNOSIS — R21 RASH: Primary | ICD-10-CM

## 2024-04-06 PROCEDURE — 99283 EMERGENCY DEPT VISIT LOW MDM: CPT | Performed by: NURSE PRACTITIONER

## 2024-04-06 PROCEDURE — G0382 LEV 3 HOSP TYPE B ED VISIT: HCPCS | Performed by: NURSE PRACTITIONER

## 2024-04-06 NOTE — PROGRESS NOTES
[facility]        NAME: Milton Turpin is a 38 y.o. male  : 1985    MRN: [id number]  DATE: 2024  TIME: 12:59 PM    Assessment and Plan   Rash [R21]  1. Rash  nystatin-triamcinolone (MYCOLOG-II) cream    Ambulatory Referral to Dermatology            Patient Instructions     Patient Instructions   Use cream as directed. Can take Benadryl as needed for itching, it can make you drowsy.  Oat meal soaks can be helpful.  If you develop any increased redness, rash is spreading, facial swelling, shortness of breath, difficulty breathing, fever, any new or concerning symptoms please return or proceed ER.  Advised follow-up with PCP in 3-5 days       If tests have been performed at [facility], our office will contact you with results if changes need to be made to the care plan discussed with you at the visit.  You can review your full results on [facility].    Chief Complaint     Chief Complaint   Patient presents with    Rash     Rash under his left arm started September of last year. Off and on          History of Present Illness       Rash  This is a new problem. Episode onset: 8 months. The problem has been waxing and waning since onset. The affected locations include the left axilla. The problem is mild. The rash is characterized by itchiness and redness. He was exposed to nothing. Associated symptoms include itching. Pertinent negatives include no cough, facial edema, fatigue, fever, joint pain, rhinorrhea, shortness of breath or sore throat. Past treatments include anti-itch cream. The treatment provided mild relief. There were no sick contacts.       Review of Systems   Review of Systems   Constitutional:  Negative for chills, diaphoresis, fatigue and fever.   HENT: Negative.  Negative for rhinorrhea and sore throat.    Eyes:  Negative for photophobia and visual disturbance.   Respiratory:  Negative for cough, chest tightness, shortness of breath, wheezing and stridor.    Cardiovascular:   Negative for chest pain and palpitations.   Gastrointestinal: Negative.    Musculoskeletal:  Negative for arthralgias, back pain, joint pain, joint swelling, myalgias, neck pain and neck stiffness.   Skin:  Positive for itching and rash.   Neurological:  Negative for dizziness, syncope, weakness, light-headedness, numbness and headaches.         Current Medications       Current Outpatient Medications:     citalopram (CeleXA) 20 mg tablet, Take 1 tablet (20 mg total) by mouth daily, Disp: 90 tablet, Rfl: 0    esomeprazole (NexIUM) 20 mg capsule, Take 20 mg by mouth every morning before breakfast, Disp: , Rfl:     hydrOXYzine HCL (ATARAX) 10 mg tablet, Take 1 tablet (10 mg total) by mouth every 6 (six) hours as needed for anxiety, Disp: 30 tablet, Rfl: 1    nystatin-triamcinolone (MYCOLOG-II) cream, Apply topically 2 (two) times a day, Disp: 30 g, Rfl: 0    predniSONE 10 mg tablet, 3 tablets BID x 2 days,2 tablets BID x 2 days,1 tablet BID x 2 days,1 tablet daily x 2 days., Disp: 26 tablet, Rfl: 0    acyclovir (ZOVIRAX) 5 % ointment, Apply topically every 3 (three) hours for 7 days, Disp: 15 g, Rfl: 2    acyclovir (ZOVIRAX) 800 mg tablet, Take 1 tablet (800 mg total) by mouth 4 (four) times a day for 10 days, Disp: 40 tablet, Rfl: 0    hydrocortisone 2.5 % cream, Apply topically 2 (two) times a day (Patient not taking: Reported on 4/6/2024), Disp: 30 g, Rfl: 0    Current Allergies     Allergies as of 04/06/2024 - Reviewed 04/06/2024   Allergen Reaction Noted    Wellbutrin [bupropion] Throat Swelling 07/04/2022            The following portions of the patient's history were reviewed and updated as appropriate: allergies, current medications, past family history, past medical history, past social history, past surgical history and problem list.     Past Medical History:   Diagnosis Date    Anxiety     Anxiety and depression     Depression     GERD (gastroesophageal reflux disease)     Pneumonia     Psychiatric disorder         Past Surgical History:   Procedure Laterality Date    CHOLECYSTECTOMY  2021    [place] LAPAROSCOPY SURG CHOLECYSTECTOMY N/A 6/25/2021    Procedure: CHOLECYSTECTOMY LAPAROSCOPIC;  Surgeon: Shannon Espinosa MD;  Location: [facility];  Service: General    WISDOM TOOTH EXTRACTION         Family History   Problem Relation Age of Onset    Anxiety disorder Father         Not sure if mini heart attack or anxiety    Hypertension Father     Stomach cancer Sister     Dysphagia Sister          Medications have been verified.        Objective   /97   Pulse 104   Temp 98.3 °F (36.8 °C)   Resp 16   Ht 6' (1.829 m)   Wt 111 kg (245 lb)   SpO2 98%   BMI 33.23 kg/m²   No LMP for male patient.       Physical Exam     Physical Exam  Constitutional:       General: He is not in acute distress.     Appearance: Normal appearance. He is not diaphoretic.   HENT:      Head: Normocephalic and atraumatic.   Cardiovascular:      Rate and Rhythm: Normal rate and regular rhythm.      Heart sounds: Normal heart sounds, S1 normal and S2 normal.   Pulmonary:      Effort: Pulmonary effort is normal.      Breath sounds: Normal breath sounds and air entry.   Skin:     General: Skin is warm and dry.      Capillary Refill: Capillary refill takes less than 2 seconds.      Findings: Rash (area of erythema to left axilla, scattered satellite lesions.) present.   Neurological:      Mental Status: He is alert.

## 2024-05-02 ENCOUNTER — OFFICE VISIT (OUTPATIENT)
Dept: URGENT CARE | Facility: CLINIC | Age: 39
End: 2024-05-02
Payer: COMMERCIAL

## 2024-05-02 VITALS
RESPIRATION RATE: 18 BRPM | SYSTOLIC BLOOD PRESSURE: 140 MMHG | TEMPERATURE: 97.7 F | WEIGHT: 243 LBS | HEIGHT: 72 IN | OXYGEN SATURATION: 97 % | HEART RATE: 130 BPM | DIASTOLIC BLOOD PRESSURE: 86 MMHG | BODY MASS INDEX: 32.91 KG/M2

## 2024-05-02 DIAGNOSIS — J02.9 SORE THROAT: ICD-10-CM

## 2024-05-02 DIAGNOSIS — J02.0 STREP PHARYNGITIS: Primary | ICD-10-CM

## 2024-05-02 LAB — S PYO AG THROAT QL: NEGATIVE

## 2024-05-02 PROCEDURE — 99283 EMERGENCY DEPT VISIT LOW MDM: CPT | Performed by: NURSE PRACTITIONER

## 2024-05-02 PROCEDURE — G0382 LEV 3 HOSP TYPE B ED VISIT: HCPCS | Performed by: NURSE PRACTITIONER

## 2024-05-02 PROCEDURE — 87880 STREP A ASSAY W/OPTIC: CPT | Performed by: NURSE PRACTITIONER

## 2024-05-02 RX ORDER — PENICILLIN V POTASSIUM 500 MG/1
500 TABLET ORAL 2 TIMES DAILY
Qty: 20 TABLET | Refills: 0 | Status: SHIPPED | OUTPATIENT
Start: 2024-05-02 | End: 2024-05-12

## 2024-05-02 RX ORDER — PENICILLIN V POTASSIUM 500 MG/1
500 TABLET ORAL 2 TIMES DAILY
Qty: 20 TABLET | Refills: 0 | Status: SHIPPED | OUTPATIENT
Start: 2024-05-02 | End: 2024-05-02 | Stop reason: SDUPTHER

## 2024-05-02 NOTE — PROGRESS NOTES
[facility]        NAME: Milton Turpin is a 38 y.o. male  : 1985    MRN: [id number]  DATE: May 2, 2024  TIME: 4:36 PM    Assessment and Plan   Strep pharyngitis [J02.0]  1. Strep pharyngitis  penicillin V potassium (VEETID) 500 mg tablet      2. Sore throat  POCT rapid strepA        Rapid strep negative, will treat based on clinical presentation    Patient Instructions     Patient Instructions   Take antibiotic as directed. Drink plenty of fluids, rest, saltwater gargles, lozenges, hot tea with honey. Tylenol or motrin for pain.  If you develop a prolonged high fever, difficulty breathing, swallowing, managing secretions, decreased fluid intake, or urination, any new or concerning symptoms please return or proceed ER.  Recommend following up with PCP in 3-5 days.      If tests have been performed at [facility], our office will contact you with results if changes need to be made to the care plan discussed with you at the visit.  [facility]    Chief Complaint     Chief Complaint   Patient presents with    Sore Throat     Started yesterday with a sore throat.No fever, feels a little out of it.          History of Present Illness       Sore Throat   This is a new problem. The current episode started yesterday. The problem has been unchanged. Neither side of throat is experiencing more pain than the other. Maximum temperature: subjective. The pain is moderate. Associated symptoms include ear pain and swollen glands. Pertinent negatives include no abdominal pain, congestion, coughing, diarrhea, drooling, headaches, hoarse voice, neck pain, shortness of breath, stridor, trouble swallowing or vomiting. He has had no exposure to strep or mono. He has tried nothing for the symptoms. The treatment provided no relief.       Review of Systems   Review of Systems   Constitutional:  Positive for fever. Negative for chills, diaphoresis and fatigue.   HENT:  Positive for ear  pain and sore throat. Negative for congestion, drooling, facial swelling, hoarse voice, mouth sores, postnasal drip, rhinorrhea, sinus pressure, sinus pain and trouble swallowing.    Eyes:  Negative for photophobia and visual disturbance.   Respiratory:  Negative for cough, chest tightness, shortness of breath and stridor.    Cardiovascular:  Negative for chest pain.   Gastrointestinal:  Negative for abdominal pain, diarrhea, nausea and vomiting.   Genitourinary: Negative.    Musculoskeletal:  Positive for myalgias. Negative for arthralgias, back pain, joint swelling, neck pain and neck stiffness.   Skin:  Negative for rash.   Neurological:  Negative for dizziness, facial asymmetry, weakness, light-headedness, numbness and headaches.         Current Medications       Current Outpatient Medications:     citalopram (CeleXA) 20 mg tablet, Take 1 tablet (20 mg total) by mouth daily, Disp: 90 tablet, Rfl: 0    esomeprazole (NexIUM) 20 mg capsule, Take 20 mg by mouth every morning before breakfast, Disp: , Rfl:     hydrocortisone 2.5 % cream, Apply topically 2 (two) times a day, Disp: 30 g, Rfl: 0    hydrOXYzine HCL (ATARAX) 10 mg tablet, Take 1 tablet (10 mg total) by mouth every 6 (six) hours as needed for anxiety, Disp: 30 tablet, Rfl: 1    nystatin-triamcinolone (MYCOLOG-II) cream, Apply topically 2 (two) times a day, Disp: 30 g, Rfl: 0    penicillin V potassium (VEETID) 500 mg tablet, Take 1 tablet (500 mg total) by mouth 2 (two) times a day for 10 days, Disp: 20 tablet, Rfl: 0    acyclovir (ZOVIRAX) 5 % ointment, Apply topically every 3 (three) hours for 7 days, Disp: 15 g, Rfl: 2    acyclovir (ZOVIRAX) 800 mg tablet, Take 1 tablet (800 mg total) by mouth 4 (four) times a day for 10 days, Disp: 40 tablet, Rfl: 0    predniSONE 10 mg tablet, 3 tablets BID x 2 days,2 tablets BID x 2 days,1 tablet BID x 2 days,1 tablet daily x 2 days. (Patient not taking: Reported on 5/2/2024), Disp: 26 tablet, Rfl: 0    Current  Allergies     Allergies as of 05/02/2024 - Reviewed 05/02/2024   Allergen Reaction Noted    Wellbutrin [bupropion] Throat Swelling 07/04/2022            The following portions of the patient's history were reviewed and updated as appropriate: allergies, current medications, past family history, past medical history, past social history, past surgical history and problem list.     Past Medical History:   Diagnosis Date    Anxiety     Anxiety and depression     Depression     GERD (gastroesophageal reflux disease)     Pneumonia     Psychiatric disorder        Past Surgical History:   Procedure Laterality Date    CHOLECYSTECTOMY  2021    [place] LAPAROSCOPY SURG CHOLECYSTECTOMY N/A 6/25/2021    Procedure: CHOLECYSTECTOMY LAPAROSCOPIC;  Surgeon: Shannon Espinosa MD;  Location: [facility];  Service: General    WISDOM TOOTH EXTRACTION         Family History   Problem Relation Age of Onset    Anxiety disorder Father         Not sure if mini heart attack or anxiety    Hypertension Father     Stomach cancer Sister     Dysphagia Sister          Medications have been verified.        Objective   /86   Pulse (!) 130   Temp 97.7 °F (36.5 °C)   Resp 18   Ht 6' (1.829 m)   Wt 110 kg (243 lb)   SpO2 97%   BMI 32.96 kg/m²   No LMP for male patient.       Physical Exam     Physical Exam  Constitutional:       General: He is not in acute distress.     Appearance: He is well-developed.   HENT:      Head: Normocephalic and atraumatic.      Right Ear: Hearing, tympanic membrane, ear canal and external ear normal.      Left Ear: Hearing, tympanic membrane, ear canal and external ear normal.      Nose: Nose normal.      Mouth/Throat:      Pharynx: Uvula midline. Posterior oropharyngeal erythema present. No pharyngeal swelling, oropharyngeal exudate or uvula swelling.      Tonsils: No tonsillar exudate or tonsillar abscesses. 2+ on the right. 2+ on the left.   Cardiovascular:      Rate and Rhythm: Normal rate and regular rhythm.       Heart sounds: Normal heart sounds, S1 normal and S2 normal.   Pulmonary:      Effort: Pulmonary effort is normal.      Breath sounds: Normal breath sounds.   Lymphadenopathy:      Cervical: Cervical adenopathy present.      Right cervical: Superficial cervical adenopathy present.      Left cervical: Superficial cervical adenopathy present.   Skin:     General: Skin is warm and dry.      Capillary Refill: Capillary refill takes less than 2 seconds.   Neurological:      Mental Status: He is alert and oriented to person, place, and time.

## 2024-06-05 DIAGNOSIS — F41.9 ANXIETY: ICD-10-CM

## 2024-06-05 RX ORDER — CITALOPRAM 20 MG/1
20 TABLET ORAL DAILY
Qty: 90 TABLET | Refills: 0 | Status: SHIPPED | OUTPATIENT
Start: 2024-06-05

## 2024-06-05 NOTE — TELEPHONE ENCOUNTER
VM on rx line:    Teo my name is Milton Turpin. My birthday is 1985. I'm calling to request a refill on my prescription which is Citalopram, Celexa and normally the quantity is a month I believe or three months, 90 days I think. I'm just requesting so I don't have to keep calling multiple refills on that if that's possible. I've been using the this prescription for years so I would also like it. It'd be easier for me to pick it up from work if it could be filled at [facility], which is on [facility]. I don't have the phone number off my hand, I just forgot. I'm sorry if you can call me back if there's an issue or anything. Thank you. Hallie.    You received a voice mail from [employer].

## 2024-09-20 NOTE — PRE-PROCEDURE INSTRUCTIONS
My Surgical Experience    The following information was developed to assist you to prepare for your operation  What do I need to do before coming to the hospital?   Arrange for a responsible person to drive you to and from the hospital    Arrange care for your children at home  Children are not allowed in the recovery areas of the hospital   Plan to wear clothing that is easy to put on and take off  If you are having shoulder surgery, wear a shirt that buttons or zippers in the front  Bathing  o Shower the evening before and the morning of your surgery with an antibacterial soap  Please refer to the Pre Op Showering Instructions for Surgery Patients Sheet   o Remove nail polish and all body piercing jewelry  o Do not shave any body part for at least 24 hours before surgery-this includes face, arms, legs and upper body  Food  o Nothing to eat or drink after midnight the night before your surgery  This includes candy and chewing gum  o Exception: If your surgery is after 12:00pm (noon), you may have clear liquids such as 7-Up®, ginger ale, apple or cranberry juice, Jell-O®, water, or clear broth until 8:00 am  o Do not drink milk or juice with pulp on the morning before surgery  o Do not drink alcohol 24 hours before surgery  Medicine  o Follow instructions you received from your surgeon about which medicines you may take on the day of surgery  o If instructed to take medicine on the morning of surgery, take pills with just a small sip of water  Call your prescribing doctor for specific infroamtion on what to do if you take insulin    What should I bring to the hospital?    Bring:  Elcarmina Lopez or a walker, if you have them, for foot or knee surgery   A list of the daily medicines, vitamins, minerals, herbals and nutritional supplements you take   Include the dosages of medicines and the time you take them each day   Glasses, dentures or hearing aids   Minimal clothing; you will be wearing hospital sleepwear   Photo ID; required to verify your identity   If you have a Living Will or Power of , bring a copy of the documents   If you have an ostomy, bring an extra pouch and any supplies you use    Do not bring   Medicines or inhalers   Money, valuables or jewelry    What other information should I know about the day of surgery?  Notify your surgeons if you develop a cold, sore throat, cough, fever, rash or any other illness   Report to the Ambulatory Surgical/Same Day Surgery Unit   You will be instructed to stop at Registration only if you have not been pre-registered   Inform your  fi they do not stay that they will be asked by the staff to leave a phone number where they can be reached   Be available to be reached before surgery  In the event the operating room schedule changes, you may be asked to come in earlier or later than expected    *It is important to tell your doctor and others involved in your health care if you are taking or have been taking any non-prescription drugs, vitamins, minerals, herbals or other nutritional supplements  Any of these may interact with some food or medicines and cause a reaction      Pre-Surgery Instructions:   Medication Instructions    citalopram (CeleXA) 10 mg tablet Instructed patient per Anesthesia Guidelines   omeprazole (PriLOSEC) 20 mg delayed release capsule Instructed patient per Anesthesia Guidelines  To take omeprazole a m  of surgery  Independent interpretation of the left knee X-Ray film(s) performed by [facility] are negative for Fractures, dislocations, or subluxations.

## 2025-04-04 NOTE — PATIENT INSTRUCTIONS
Use cream as directed. Can take Benadryl as needed for itching, it can make you drowsy.  Oat meal soaks can be helpful.  If you develop any increased redness, rash is spreading, facial swelling, shortness of breath, difficulty breathing, fever, any new or concerning symptoms please return or proceed ER.  Advised follow-up with PCP in 3-5 days     Statement Selected

## (undated) DEVICE — SUT VICRYL 0 UR-6 27 IN J603H

## (undated) DEVICE — GLOVE SRG BIOGEL ECLIPSE 6

## (undated) DEVICE — IRRIGATOR DISPOSABLE SUCTION

## (undated) DEVICE — ENDOPATH 5MM CURVED SCISSORS WITH MONOPOLAR CAUTERY: Brand: ENDOPATH

## (undated) DEVICE — SUT MONOCRYL 4-0 PS-2 18 IN Y496G

## (undated) DEVICE — INTENDED FOR TISSUE SEPARATION, AND OTHER PROCEDURES THAT REQUIRE A SHARP SURGICAL BLADE TO PUNCTURE OR CUT.: Brand: BARD-PARKER SAFETY BLADES SIZE 11, STERILE

## (undated) DEVICE — PACK GENERAL LF

## (undated) DEVICE — TUBING SMOKE EVAC W/FILTRATION DEVICE PLUMEPORT

## (undated) DEVICE — TROCAR: Brand: KII FIOS FIRST ENTRY

## (undated) DEVICE — FABRIC REINFORCED, SURGICAL GOWN, XL: Brand: CONVERTORS

## (undated) DEVICE — ELECTRODE LAP J HOOK SPLIT STEM E-Z CLEAN 33CM -0021S

## (undated) DEVICE — PDS II VLT 0 107CM AG ST3: Brand: ENDOLOOP

## (undated) DEVICE — CHLORAPREP HI-LITE 26ML ORANGE

## (undated) DEVICE — TROCAR: Brand: KII® SLEEVE

## (undated) DEVICE — TISSUE RETRIEVAL SYSTEM: Brand: INZII RETRIEVAL SYSTEM

## (undated) DEVICE — DRAPE UTILITY

## (undated) DEVICE — Device

## (undated) DEVICE — GLOVE INDICATOR PI UNDERGLOVE SZ 6.5 BLUE

## (undated) DEVICE — ADHESIVE SKIN HIGH VISCOSITY EXOFIN 1ML

## (undated) DEVICE — BASIC DOUBLE BASIN 2-LF: Brand: MEDLINE INDUSTRIES, INC.

## (undated) DEVICE — LAPAROSCOPIC TROCAR SLEEVE/SINGLE USE: Brand: KII® OPTICAL ACCESS SYSTEM

## (undated) DEVICE — ENDOPATH PNEUMONEEDLE INSUFFLATION NEEDLES WITH LUER LOCK CONNECTORS 120MM: Brand: ENDOPATH

## (undated) DEVICE — TIBURON GENERAL ENDOSCOPY DRAPE: Brand: CONVERTORS

## (undated) DEVICE — LIGAMAX 5 MM ENDOSCOPIC MULTIPLE CLIP APPLIER: Brand: LIGAMAX